# Patient Record
Sex: FEMALE | Race: WHITE | NOT HISPANIC OR LATINO | Employment: OTHER | URBAN - METROPOLITAN AREA
[De-identification: names, ages, dates, MRNs, and addresses within clinical notes are randomized per-mention and may not be internally consistent; named-entity substitution may affect disease eponyms.]

---

## 2017-08-28 ENCOUNTER — HOSPITAL ENCOUNTER (INPATIENT)
Facility: HOSPITAL | Age: 67
LOS: 3 days | Discharge: HOME | DRG: 189 | End: 2017-08-31
Attending: INTERNAL MEDICINE | Admitting: INTERNAL MEDICINE
Payer: MEDICARE

## 2017-08-28 ENCOUNTER — APPOINTMENT (INPATIENT)
Dept: CT IMAGING | Facility: HOSPITAL | Age: 67
DRG: 189 | End: 2017-08-28
Payer: MEDICARE

## 2017-08-28 ENCOUNTER — APPOINTMENT (EMERGENCY)
Dept: RADIOLOGY | Facility: HOSPITAL | Age: 67
DRG: 189 | End: 2017-08-28
Payer: MEDICARE

## 2017-08-28 DIAGNOSIS — J96.01 ACUTE RESPIRATORY FAILURE WITH HYPOXIA (HCC): ICD-10-CM

## 2017-08-28 DIAGNOSIS — J44.1 COPD EXACERBATION (HCC): Primary | ICD-10-CM

## 2017-08-28 DIAGNOSIS — J44.9 COPD (CHRONIC OBSTRUCTIVE PULMONARY DISEASE) (HCC): Chronic | ICD-10-CM

## 2017-08-28 PROBLEM — E11.9 TYPE 2 DIABETES MELLITUS WITHOUT COMPLICATION, WITHOUT LONG-TERM CURRENT USE OF INSULIN (HCC): Chronic | Status: ACTIVE | Noted: 2017-08-28

## 2017-08-28 PROBLEM — E78.5 HLD (HYPERLIPIDEMIA): Chronic | Status: ACTIVE | Noted: 2017-08-28

## 2017-08-28 LAB
ANION GAP SERPL CALCULATED.3IONS-SCNC: 9 MMOL/L (ref 4–13)
ATRIAL RATE: 103 BPM
BASE EXCESS BLDA CALC-SCNC: 0 MMOL/L (ref -2–3)
BASOPHILS # BLD AUTO: 0.03 THOUSANDS/ΜL (ref 0–0.1)
BASOPHILS NFR BLD AUTO: 0 % (ref 0–1)
BUN SERPL-MCNC: 7 MG/DL (ref 5–25)
CA-I BLD-SCNC: 1.17 MMOL/L (ref 1.12–1.32)
CALCIUM SERPL-MCNC: 9.1 MG/DL (ref 8.3–10.1)
CHLORIDE SERPL-SCNC: 104 MMOL/L (ref 100–108)
CO2 SERPL-SCNC: 30 MMOL/L (ref 21–32)
CREAT SERPL-MCNC: 0.69 MG/DL (ref 0.6–1.3)
EOSINOPHIL # BLD AUTO: 0.57 THOUSAND/ΜL (ref 0–0.61)
EOSINOPHIL NFR BLD AUTO: 6 % (ref 0–6)
ERYTHROCYTE [DISTWIDTH] IN BLOOD BY AUTOMATED COUNT: 13 % (ref 11.6–15.1)
GFR SERPL CREATININE-BSD FRML MDRD: 90 ML/MIN/1.73SQ M
GLUCOSE SERPL-MCNC: 124 MG/DL (ref 65–140)
GLUCOSE SERPL-MCNC: 131 MG/DL (ref 65–140)
GLUCOSE SERPL-MCNC: 230 MG/DL (ref 65–140)
GLUCOSE SERPL-MCNC: 292 MG/DL (ref 65–140)
HCO3 BLDA-SCNC: 23.4 MMOL/L (ref 24–30)
HCT VFR BLD AUTO: 38.8 % (ref 34.8–46.1)
HCT VFR BLD CALC: 38 % (ref 34.8–46.1)
HGB BLD-MCNC: 12.7 G/DL (ref 11.5–15.4)
HGB BLDA-MCNC: 12.9 G/DL (ref 11.5–15.4)
LYMPHOCYTES # BLD AUTO: 2.2 THOUSANDS/ΜL (ref 0.6–4.47)
LYMPHOCYTES NFR BLD AUTO: 23 % (ref 14–44)
MAGNESIUM SERPL-MCNC: 1.9 MG/DL (ref 1.6–2.6)
MCH RBC QN AUTO: 30.3 PG (ref 26.8–34.3)
MCHC RBC AUTO-ENTMCNC: 32.7 G/DL (ref 31.4–37.4)
MCV RBC AUTO: 93 FL (ref 82–98)
MONOCYTES # BLD AUTO: 0.91 THOUSAND/ΜL (ref 0.17–1.22)
MONOCYTES NFR BLD AUTO: 10 % (ref 4–12)
NEUTROPHILS # BLD AUTO: 5.73 THOUSANDS/ΜL (ref 1.85–7.62)
NEUTS SEG NFR BLD AUTO: 61 % (ref 43–75)
NT-PROBNP SERPL-MCNC: 57 PG/ML
P AXIS: 84 DEGREES
PCO2 BLD: 24 MMOL/L (ref 21–32)
PCO2 BLD: 34.2 MM HG (ref 42–50)
PH BLD: 7.44 [PH] (ref 7.3–7.4)
PLATELET # BLD AUTO: 282 THOUSANDS/UL (ref 149–390)
PMV BLD AUTO: 9.4 FL (ref 8.9–12.7)
PO2 BLD: 59 MM HG (ref 35–45)
POTASSIUM BLD-SCNC: 3.4 MMOL/L (ref 3.5–5.3)
POTASSIUM SERPL-SCNC: 4.3 MMOL/L (ref 3.5–5.3)
QRS AXIS: 77 DEGREES
QRSD INTERVAL: 74 MS
QT INTERVAL: 322 MS
QTC INTERVAL: 413 MS
RBC # BLD AUTO: 4.19 MILLION/UL (ref 3.81–5.12)
SAO2 % BLD FROM PO2: 91 % (ref 95–98)
SODIUM BLD-SCNC: 139 MMOL/L (ref 136–145)
SODIUM SERPL-SCNC: 143 MMOL/L (ref 136–145)
SPECIMEN SOURCE: ABNORMAL
T WAVE AXIS: 72 DEGREES
TROPONIN I SERPL-MCNC: <0.02 NG/ML
VENTRICULAR RATE: 99 BPM
WBC # BLD AUTO: 9.44 THOUSAND/UL (ref 4.31–10.16)

## 2017-08-28 PROCEDURE — 96374 THER/PROPH/DIAG INJ IV PUSH: CPT

## 2017-08-28 PROCEDURE — 82803 BLOOD GASES ANY COMBINATION: CPT

## 2017-08-28 PROCEDURE — 99285 EMERGENCY DEPT VISIT HI MDM: CPT

## 2017-08-28 PROCEDURE — 36600 WITHDRAWAL OF ARTERIAL BLOOD: CPT

## 2017-08-28 PROCEDURE — 83880 ASSAY OF NATRIURETIC PEPTIDE: CPT | Performed by: PHYSICIAN ASSISTANT

## 2017-08-28 PROCEDURE — 83735 ASSAY OF MAGNESIUM: CPT | Performed by: PHYSICIAN ASSISTANT

## 2017-08-28 PROCEDURE — 82947 ASSAY GLUCOSE BLOOD QUANT: CPT

## 2017-08-28 PROCEDURE — 71020 HB CHEST X-RAY 2VW FRONTAL&LATL: CPT

## 2017-08-28 PROCEDURE — 71250 CT THORAX DX C-: CPT

## 2017-08-28 PROCEDURE — 80048 BASIC METABOLIC PNL TOTAL CA: CPT | Performed by: PHYSICIAN ASSISTANT

## 2017-08-28 PROCEDURE — 94640 AIRWAY INHALATION TREATMENT: CPT

## 2017-08-28 PROCEDURE — 94664 DEMO&/EVAL PT USE INHALER: CPT

## 2017-08-28 PROCEDURE — 82330 ASSAY OF CALCIUM: CPT

## 2017-08-28 PROCEDURE — 84295 ASSAY OF SERUM SODIUM: CPT

## 2017-08-28 PROCEDURE — 84132 ASSAY OF SERUM POTASSIUM: CPT

## 2017-08-28 PROCEDURE — 84484 ASSAY OF TROPONIN QUANT: CPT | Performed by: PHYSICIAN ASSISTANT

## 2017-08-28 PROCEDURE — 82948 REAGENT STRIP/BLOOD GLUCOSE: CPT

## 2017-08-28 PROCEDURE — 94660 CPAP INITIATION&MGMT: CPT

## 2017-08-28 PROCEDURE — 96375 TX/PRO/DX INJ NEW DRUG ADDON: CPT

## 2017-08-28 PROCEDURE — 93005 ELECTROCARDIOGRAM TRACING: CPT | Performed by: PHYSICIAN ASSISTANT

## 2017-08-28 PROCEDURE — 85014 HEMATOCRIT: CPT

## 2017-08-28 PROCEDURE — 85025 COMPLETE CBC W/AUTO DIFF WBC: CPT | Performed by: PHYSICIAN ASSISTANT

## 2017-08-28 PROCEDURE — 94760 N-INVAS EAR/PLS OXIMETRY 1: CPT

## 2017-08-28 PROCEDURE — 36415 COLL VENOUS BLD VENIPUNCTURE: CPT | Performed by: PHYSICIAN ASSISTANT

## 2017-08-28 PROCEDURE — 94668 MNPJ CHEST WALL SBSQ: CPT

## 2017-08-28 RX ORDER — LEVALBUTEROL 1.25 MG/.5ML
1.25 SOLUTION, CONCENTRATE RESPIRATORY (INHALATION)
Status: DISCONTINUED | OUTPATIENT
Start: 2017-08-28 | End: 2017-08-31 | Stop reason: HOSPADM

## 2017-08-28 RX ORDER — METHYLPREDNISOLONE SODIUM SUCCINATE 40 MG/ML
40 INJECTION, POWDER, LYOPHILIZED, FOR SOLUTION INTRAMUSCULAR; INTRAVENOUS EVERY 8 HOURS
Status: DISCONTINUED | OUTPATIENT
Start: 2017-08-28 | End: 2017-08-31

## 2017-08-28 RX ORDER — METHYLPREDNISOLONE SODIUM SUCCINATE 125 MG/2ML
125 INJECTION, POWDER, LYOPHILIZED, FOR SOLUTION INTRAMUSCULAR; INTRAVENOUS ONCE
Status: COMPLETED | OUTPATIENT
Start: 2017-08-28 | End: 2017-08-28

## 2017-08-28 RX ORDER — SIMVASTATIN 20 MG
20 TABLET ORAL
COMMUNITY
End: 2018-01-30 | Stop reason: SDUPTHER

## 2017-08-28 RX ORDER — METFORMIN HYDROCHLORIDE 500 MG/1
500 TABLET, FILM COATED, EXTENDED RELEASE ORAL
COMMUNITY
End: 2018-01-30 | Stop reason: SDUPTHER

## 2017-08-28 RX ORDER — BUDESONIDE AND FORMOTEROL FUMARATE DIHYDRATE 160; 4.5 UG/1; UG/1
2 AEROSOL RESPIRATORY (INHALATION) 2 TIMES DAILY
COMMUNITY
End: 2017-08-31 | Stop reason: HOSPADM

## 2017-08-28 RX ORDER — BUDESONIDE AND FORMOTEROL FUMARATE DIHYDRATE 160; 4.5 UG/1; UG/1
2 AEROSOL RESPIRATORY (INHALATION) 2 TIMES DAILY
Status: DISCONTINUED | OUTPATIENT
Start: 2017-08-28 | End: 2017-08-31 | Stop reason: HOSPADM

## 2017-08-28 RX ORDER — ALBUTEROL SULFATE 2.5 MG/3ML
5 SOLUTION RESPIRATORY (INHALATION) ONCE
Status: COMPLETED | OUTPATIENT
Start: 2017-08-28 | End: 2017-08-28

## 2017-08-28 RX ORDER — GUAIFENESIN 600 MG
1200 TABLET, EXTENDED RELEASE 12 HR ORAL EVERY 12 HOURS SCHEDULED
Status: DISCONTINUED | OUTPATIENT
Start: 2017-08-28 | End: 2017-08-30

## 2017-08-28 RX ORDER — ALBUTEROL SULFATE 90 UG/1
2 AEROSOL, METERED RESPIRATORY (INHALATION) EVERY 6 HOURS PRN
COMMUNITY
End: 2018-08-01 | Stop reason: SDUPTHER

## 2017-08-28 RX ORDER — DIPHENHYDRAMINE HYDROCHLORIDE 50 MG/ML
25 INJECTION INTRAMUSCULAR; INTRAVENOUS EVERY 6 HOURS PRN
Status: DISCONTINUED | OUTPATIENT
Start: 2017-08-28 | End: 2017-08-31 | Stop reason: HOSPADM

## 2017-08-28 RX ORDER — ALBUTEROL SULFATE 2.5 MG/3ML
2.5 SOLUTION RESPIRATORY (INHALATION) EVERY 6 HOURS PRN
Status: DISCONTINUED | OUTPATIENT
Start: 2017-08-28 | End: 2017-08-31 | Stop reason: DRUGHIGH

## 2017-08-28 RX ORDER — LORAZEPAM 2 MG/ML
0.5 INJECTION INTRAMUSCULAR ONCE
Status: COMPLETED | OUTPATIENT
Start: 2017-08-28 | End: 2017-08-28

## 2017-08-28 RX ORDER — GUAIFENESIN/DEXTROMETHORPHAN 100-10MG/5
10 SYRUP ORAL EVERY 4 HOURS PRN
Status: DISCONTINUED | OUTPATIENT
Start: 2017-08-28 | End: 2017-08-31 | Stop reason: HOSPADM

## 2017-08-28 RX ORDER — PRAVASTATIN SODIUM 40 MG
40 TABLET ORAL
Status: DISCONTINUED | OUTPATIENT
Start: 2017-08-28 | End: 2017-08-31 | Stop reason: HOSPADM

## 2017-08-28 RX ADMIN — Medication 0.5 MG: at 11:07

## 2017-08-28 RX ADMIN — METHYLPREDNISOLONE SODIUM SUCCINATE 125 MG: 125 INJECTION, POWDER, FOR SOLUTION INTRAMUSCULAR; INTRAVENOUS at 11:53

## 2017-08-28 RX ADMIN — IPRATROPIUM BROMIDE 0.5 MG: 0.5 SOLUTION RESPIRATORY (INHALATION) at 11:07

## 2017-08-28 RX ADMIN — GUAIFENESIN AND DEXTROMETHORPHAN 10 ML: 100; 10 SYRUP ORAL at 23:07

## 2017-08-28 RX ADMIN — GUAIFENESIN 1200 MG: 600 TABLET, EXTENDED RELEASE ORAL at 20:52

## 2017-08-28 RX ADMIN — LORAZEPAM 0.5 MG: 2 INJECTION INTRAMUSCULAR; INTRAVENOUS at 12:18

## 2017-08-28 RX ADMIN — LEVALBUTEROL HYDROCHLORIDE 1.25 MG: 1.25 SOLUTION, CONCENTRATE RESPIRATORY (INHALATION) at 19:53

## 2017-08-28 RX ADMIN — METHYLPREDNISOLONE SODIUM SUCCINATE 40 MG: 40 INJECTION, POWDER, FOR SOLUTION INTRAMUSCULAR; INTRAVENOUS at 16:25

## 2017-08-28 RX ADMIN — IPRATROPIUM BROMIDE 0.5 MG: 0.5 SOLUTION RESPIRATORY (INHALATION) at 19:53

## 2017-08-28 RX ADMIN — INSULIN LISPRO 2 UNITS: 100 INJECTION, SOLUTION INTRAVENOUS; SUBCUTANEOUS at 16:38

## 2017-08-28 RX ADMIN — ENOXAPARIN SODIUM 40 MG: 40 INJECTION SUBCUTANEOUS at 16:24

## 2017-08-28 RX ADMIN — PRAVASTATIN SODIUM 40 MG: 40 TABLET ORAL at 16:24

## 2017-08-28 RX ADMIN — BUDESONIDE AND FORMOTEROL FUMARATE DIHYDRATE 2 PUFF: 160; 4.5 AEROSOL RESPIRATORY (INHALATION) at 18:00

## 2017-08-28 RX ADMIN — METHYLPREDNISOLONE SODIUM SUCCINATE 40 MG: 40 INJECTION, POWDER, FOR SOLUTION INTRAMUSCULAR; INTRAVENOUS at 23:07

## 2017-08-28 RX ADMIN — ALBUTEROL SULFATE 5 MG: 2.5 SOLUTION RESPIRATORY (INHALATION) at 11:07

## 2017-08-28 RX ADMIN — INSULIN LISPRO 2 UNITS: 100 INJECTION, SOLUTION INTRAVENOUS; SUBCUTANEOUS at 21:11

## 2017-08-29 PROBLEM — R93.89 ABNORMAL CHEST CT: Status: ACTIVE | Noted: 2017-08-29

## 2017-08-29 LAB
ANION GAP SERPL CALCULATED.3IONS-SCNC: 7 MMOL/L (ref 4–13)
BUN SERPL-MCNC: 10 MG/DL (ref 5–25)
CALCIUM SERPL-MCNC: 8.9 MG/DL (ref 8.3–10.1)
CHLORIDE SERPL-SCNC: 103 MMOL/L (ref 100–108)
CO2 SERPL-SCNC: 26 MMOL/L (ref 21–32)
CREAT SERPL-MCNC: 0.68 MG/DL (ref 0.6–1.3)
ERYTHROCYTE [DISTWIDTH] IN BLOOD BY AUTOMATED COUNT: 12.9 % (ref 11.6–15.1)
GFR SERPL CREATININE-BSD FRML MDRD: 91 ML/MIN/1.73SQ M
GLUCOSE SERPL-MCNC: 176 MG/DL (ref 65–140)
GLUCOSE SERPL-MCNC: 184 MG/DL (ref 65–140)
GLUCOSE SERPL-MCNC: 226 MG/DL (ref 65–140)
GLUCOSE SERPL-MCNC: 295 MG/DL (ref 65–140)
GLUCOSE SERPL-MCNC: 315 MG/DL (ref 65–140)
HCT VFR BLD AUTO: 35.2 % (ref 34.8–46.1)
HGB BLD-MCNC: 11.5 G/DL (ref 11.5–15.4)
MCH RBC QN AUTO: 30.1 PG (ref 26.8–34.3)
MCHC RBC AUTO-ENTMCNC: 32.7 G/DL (ref 31.4–37.4)
MCV RBC AUTO: 92 FL (ref 82–98)
PLATELET # BLD AUTO: 283 THOUSANDS/UL (ref 149–390)
PMV BLD AUTO: 9.6 FL (ref 8.9–12.7)
POTASSIUM SERPL-SCNC: 4 MMOL/L (ref 3.5–5.3)
RBC # BLD AUTO: 3.82 MILLION/UL (ref 3.81–5.12)
SODIUM SERPL-SCNC: 136 MMOL/L (ref 136–145)
WBC # BLD AUTO: 6.41 THOUSAND/UL (ref 4.31–10.16)

## 2017-08-29 PROCEDURE — 85027 COMPLETE CBC AUTOMATED: CPT | Performed by: PHYSICIAN ASSISTANT

## 2017-08-29 PROCEDURE — 82948 REAGENT STRIP/BLOOD GLUCOSE: CPT

## 2017-08-29 PROCEDURE — 94640 AIRWAY INHALATION TREATMENT: CPT

## 2017-08-29 PROCEDURE — 94760 N-INVAS EAR/PLS OXIMETRY 1: CPT

## 2017-08-29 PROCEDURE — 80048 BASIC METABOLIC PNL TOTAL CA: CPT | Performed by: PHYSICIAN ASSISTANT

## 2017-08-29 PROCEDURE — 94668 MNPJ CHEST WALL SBSQ: CPT

## 2017-08-29 RX ORDER — INSULIN GLARGINE 100 [IU]/ML
5 INJECTION, SOLUTION SUBCUTANEOUS
Status: DISCONTINUED | OUTPATIENT
Start: 2017-08-29 | End: 2017-08-30

## 2017-08-29 RX ORDER — HYDROCODONE POLISTIREX AND CHLORPHENIRAMINE POLISTIREX 10; 8 MG/5ML; MG/5ML
5 SUSPENSION, EXTENDED RELEASE ORAL EVERY 12 HOURS
Status: DISCONTINUED | OUTPATIENT
Start: 2017-08-29 | End: 2017-08-31 | Stop reason: HOSPADM

## 2017-08-29 RX ORDER — BENZONATATE 100 MG/1
100 CAPSULE ORAL 3 TIMES DAILY
Status: DISCONTINUED | OUTPATIENT
Start: 2017-08-29 | End: 2017-08-30

## 2017-08-29 RX ADMIN — IPRATROPIUM BROMIDE 0.5 MG: 0.5 SOLUTION RESPIRATORY (INHALATION) at 07:25

## 2017-08-29 RX ADMIN — IPRATROPIUM BROMIDE 0.5 MG: 0.5 SOLUTION RESPIRATORY (INHALATION) at 19:21

## 2017-08-29 RX ADMIN — ENOXAPARIN SODIUM 40 MG: 40 INJECTION SUBCUTANEOUS at 08:21

## 2017-08-29 RX ADMIN — INSULIN LISPRO 2 UNITS: 100 INJECTION, SOLUTION INTRAVENOUS; SUBCUTANEOUS at 22:03

## 2017-08-29 RX ADMIN — BENZONATATE 100 MG: 100 CAPSULE ORAL at 17:17

## 2017-08-29 RX ADMIN — GUAIFENESIN 1200 MG: 600 TABLET, EXTENDED RELEASE ORAL at 22:02

## 2017-08-29 RX ADMIN — METHYLPREDNISOLONE SODIUM SUCCINATE 40 MG: 40 INJECTION, POWDER, FOR SOLUTION INTRAMUSCULAR; INTRAVENOUS at 22:30

## 2017-08-29 RX ADMIN — INSULIN LISPRO 3 UNITS: 100 INJECTION, SOLUTION INTRAVENOUS; SUBCUTANEOUS at 12:29

## 2017-08-29 RX ADMIN — INSULIN LISPRO 1 UNITS: 100 INJECTION, SOLUTION INTRAVENOUS; SUBCUTANEOUS at 18:10

## 2017-08-29 RX ADMIN — GUAIFENESIN AND DEXTROMETHORPHAN 10 ML: 100; 10 SYRUP ORAL at 08:23

## 2017-08-29 RX ADMIN — DIPHENHYDRAMINE HYDROCHLORIDE 25 MG: 50 INJECTION, SOLUTION INTRAMUSCULAR; INTRAVENOUS at 03:27

## 2017-08-29 RX ADMIN — BUDESONIDE AND FORMOTEROL FUMARATE DIHYDRATE 2 PUFF: 160; 4.5 AEROSOL RESPIRATORY (INHALATION) at 08:22

## 2017-08-29 RX ADMIN — INSULIN LISPRO 3 UNITS: 100 INJECTION, SOLUTION INTRAVENOUS; SUBCUTANEOUS at 18:10

## 2017-08-29 RX ADMIN — HYDROCODONE POLISTIREX AND CHLORPHENIRAMINE POLISTIREX 5 ML: 10; 8 SUSPENSION, EXTENDED RELEASE ORAL at 20:03

## 2017-08-29 RX ADMIN — GUAIFENESIN AND DEXTROMETHORPHAN 10 ML: 100; 10 SYRUP ORAL at 03:26

## 2017-08-29 RX ADMIN — INSULIN LISPRO 1 UNITS: 100 INJECTION, SOLUTION INTRAVENOUS; SUBCUTANEOUS at 08:22

## 2017-08-29 RX ADMIN — INSULIN GLARGINE 5 UNITS: 100 INJECTION, SOLUTION SUBCUTANEOUS at 22:02

## 2017-08-29 RX ADMIN — LEVALBUTEROL HYDROCHLORIDE 1.25 MG: 1.25 SOLUTION, CONCENTRATE RESPIRATORY (INHALATION) at 19:21

## 2017-08-29 RX ADMIN — LEVALBUTEROL HYDROCHLORIDE 1.25 MG: 1.25 SOLUTION, CONCENTRATE RESPIRATORY (INHALATION) at 07:25

## 2017-08-29 RX ADMIN — BENZONATATE 100 MG: 100 CAPSULE ORAL at 22:02

## 2017-08-29 RX ADMIN — PRAVASTATIN SODIUM 40 MG: 40 TABLET ORAL at 17:17

## 2017-08-29 RX ADMIN — LEVALBUTEROL HYDROCHLORIDE 1.25 MG: 1.25 SOLUTION, CONCENTRATE RESPIRATORY (INHALATION) at 14:08

## 2017-08-29 RX ADMIN — METHYLPREDNISOLONE SODIUM SUCCINATE 40 MG: 40 INJECTION, POWDER, FOR SOLUTION INTRAMUSCULAR; INTRAVENOUS at 16:00

## 2017-08-29 RX ADMIN — METHYLPREDNISOLONE SODIUM SUCCINATE 40 MG: 40 INJECTION, POWDER, FOR SOLUTION INTRAMUSCULAR; INTRAVENOUS at 08:22

## 2017-08-29 RX ADMIN — GUAIFENESIN 1200 MG: 600 TABLET, EXTENDED RELEASE ORAL at 08:22

## 2017-08-29 RX ADMIN — BUDESONIDE AND FORMOTEROL FUMARATE DIHYDRATE 2 PUFF: 160; 4.5 AEROSOL RESPIRATORY (INHALATION) at 17:18

## 2017-08-29 RX ADMIN — IPRATROPIUM BROMIDE 0.5 MG: 0.5 SOLUTION RESPIRATORY (INHALATION) at 14:08

## 2017-08-29 RX ADMIN — GUAIFENESIN AND DEXTROMETHORPHAN 10 ML: 100; 10 SYRUP ORAL at 17:17

## 2017-08-29 RX ADMIN — BENZONATATE 100 MG: 100 CAPSULE ORAL at 11:47

## 2017-08-30 LAB
ANION GAP SERPL CALCULATED.3IONS-SCNC: 10 MMOL/L (ref 4–13)
BASOPHILS # BLD MANUAL: 0 THOUSAND/UL (ref 0–0.1)
BASOPHILS NFR MAR MANUAL: 0 % (ref 0–1)
BUN SERPL-MCNC: 16 MG/DL (ref 5–25)
CALCIUM SERPL-MCNC: 9.1 MG/DL (ref 8.3–10.1)
CHLORIDE SERPL-SCNC: 105 MMOL/L (ref 100–108)
CO2 SERPL-SCNC: 26 MMOL/L (ref 21–32)
CREAT SERPL-MCNC: 0.73 MG/DL (ref 0.6–1.3)
EOSINOPHIL # BLD MANUAL: 0 THOUSAND/UL (ref 0–0.4)
EOSINOPHIL NFR BLD MANUAL: 0 % (ref 0–6)
ERYTHROCYTE [DISTWIDTH] IN BLOOD BY AUTOMATED COUNT: 13.2 % (ref 11.6–15.1)
GFR SERPL CREATININE-BSD FRML MDRD: 85 ML/MIN/1.73SQ M
GLUCOSE SERPL-MCNC: 167 MG/DL (ref 65–140)
GLUCOSE SERPL-MCNC: 177 MG/DL (ref 65–140)
GLUCOSE SERPL-MCNC: 194 MG/DL (ref 65–140)
GLUCOSE SERPL-MCNC: 286 MG/DL (ref 65–140)
GLUCOSE SERPL-MCNC: 321 MG/DL (ref 65–140)
HCT VFR BLD AUTO: 36.6 % (ref 34.8–46.1)
HGB BLD-MCNC: 12 G/DL (ref 11.5–15.4)
LYMPHOCYTES # BLD AUTO: 0.64 THOUSAND/UL (ref 0.6–4.47)
LYMPHOCYTES # BLD AUTO: 4 % (ref 14–44)
MCH RBC QN AUTO: 30.4 PG (ref 26.8–34.3)
MCHC RBC AUTO-ENTMCNC: 32.8 G/DL (ref 31.4–37.4)
MCV RBC AUTO: 93 FL (ref 82–98)
MONOCYTES # BLD AUTO: 0 THOUSAND/UL (ref 0–1.22)
MONOCYTES NFR BLD: 0 % (ref 4–12)
NEUTROPHILS # BLD MANUAL: 15.31 THOUSAND/UL (ref 1.85–7.62)
NEUTS SEG NFR BLD AUTO: 96 % (ref 43–75)
PLATELET # BLD AUTO: 334 THOUSANDS/UL (ref 149–390)
PLATELET BLD QL SMEAR: ADEQUATE
PMV BLD AUTO: 9.3 FL (ref 8.9–12.7)
POTASSIUM SERPL-SCNC: 4.2 MMOL/L (ref 3.5–5.3)
RBC # BLD AUTO: 3.95 MILLION/UL (ref 3.81–5.12)
SODIUM SERPL-SCNC: 141 MMOL/L (ref 136–145)
TOTAL CELLS COUNTED SPEC: 100
WBC # BLD AUTO: 15.95 THOUSAND/UL (ref 4.31–10.16)

## 2017-08-30 PROCEDURE — 94640 AIRWAY INHALATION TREATMENT: CPT

## 2017-08-30 PROCEDURE — 94760 N-INVAS EAR/PLS OXIMETRY 1: CPT

## 2017-08-30 PROCEDURE — 80048 BASIC METABOLIC PNL TOTAL CA: CPT | Performed by: PHYSICIAN ASSISTANT

## 2017-08-30 PROCEDURE — 82948 REAGENT STRIP/BLOOD GLUCOSE: CPT

## 2017-08-30 PROCEDURE — 85027 COMPLETE CBC AUTOMATED: CPT | Performed by: PHYSICIAN ASSISTANT

## 2017-08-30 PROCEDURE — 85007 BL SMEAR W/DIFF WBC COUNT: CPT | Performed by: PHYSICIAN ASSISTANT

## 2017-08-30 PROCEDURE — 94668 MNPJ CHEST WALL SBSQ: CPT

## 2017-08-30 RX ORDER — INSULIN GLARGINE 100 [IU]/ML
10 INJECTION, SOLUTION SUBCUTANEOUS
Status: DISCONTINUED | OUTPATIENT
Start: 2017-08-30 | End: 2017-08-31 | Stop reason: HOSPADM

## 2017-08-30 RX ORDER — BENZONATATE 100 MG/1
200 CAPSULE ORAL 3 TIMES DAILY
Status: DISCONTINUED | OUTPATIENT
Start: 2017-08-30 | End: 2017-08-31 | Stop reason: HOSPADM

## 2017-08-30 RX ADMIN — METHYLPREDNISOLONE SODIUM SUCCINATE 40 MG: 40 INJECTION, POWDER, FOR SOLUTION INTRAMUSCULAR; INTRAVENOUS at 06:31

## 2017-08-30 RX ADMIN — INSULIN LISPRO 2 UNITS: 100 INJECTION, SOLUTION INTRAVENOUS; SUBCUTANEOUS at 21:38

## 2017-08-30 RX ADMIN — IPRATROPIUM BROMIDE 0.5 MG: 0.5 SOLUTION RESPIRATORY (INHALATION) at 08:24

## 2017-08-30 RX ADMIN — INSULIN LISPRO 1 UNITS: 100 INJECTION, SOLUTION INTRAVENOUS; SUBCUTANEOUS at 09:11

## 2017-08-30 RX ADMIN — HYDROCODONE POLISTIREX AND CHLORPHENIRAMINE POLISTIREX 5 ML: 10; 8 SUSPENSION, EXTENDED RELEASE ORAL at 09:03

## 2017-08-30 RX ADMIN — METHYLPREDNISOLONE SODIUM SUCCINATE 40 MG: 40 INJECTION, POWDER, FOR SOLUTION INTRAMUSCULAR; INTRAVENOUS at 15:07

## 2017-08-30 RX ADMIN — LEVALBUTEROL HYDROCHLORIDE 1.25 MG: 1.25 SOLUTION, CONCENTRATE RESPIRATORY (INHALATION) at 13:53

## 2017-08-30 RX ADMIN — BUDESONIDE AND FORMOTEROL FUMARATE DIHYDRATE 2 PUFF: 160; 4.5 AEROSOL RESPIRATORY (INHALATION) at 09:04

## 2017-08-30 RX ADMIN — INSULIN LISPRO 3 UNITS: 100 INJECTION, SOLUTION INTRAVENOUS; SUBCUTANEOUS at 12:18

## 2017-08-30 RX ADMIN — BENZONATATE 100 MG: 100 CAPSULE ORAL at 09:03

## 2017-08-30 RX ADMIN — IPRATROPIUM BROMIDE 0.5 MG: 0.5 SOLUTION RESPIRATORY (INHALATION) at 13:53

## 2017-08-30 RX ADMIN — LEVALBUTEROL HYDROCHLORIDE 1.25 MG: 1.25 SOLUTION, CONCENTRATE RESPIRATORY (INHALATION) at 19:04

## 2017-08-30 RX ADMIN — NYSTATIN 500000 UNITS: 100000 SUSPENSION ORAL at 17:55

## 2017-08-30 RX ADMIN — HYDROCODONE POLISTIREX AND CHLORPHENIRAMINE POLISTIREX 5 ML: 10; 8 SUSPENSION, EXTENDED RELEASE ORAL at 20:47

## 2017-08-30 RX ADMIN — NYSTATIN 500000 UNITS: 100000 SUSPENSION ORAL at 12:16

## 2017-08-30 RX ADMIN — IPRATROPIUM BROMIDE 0.5 MG: 0.5 SOLUTION RESPIRATORY (INHALATION) at 19:04

## 2017-08-30 RX ADMIN — INSULIN GLARGINE 10 UNITS: 100 INJECTION, SOLUTION SUBCUTANEOUS at 21:37

## 2017-08-30 RX ADMIN — INSULIN LISPRO 1 UNITS: 100 INJECTION, SOLUTION INTRAVENOUS; SUBCUTANEOUS at 17:49

## 2017-08-30 RX ADMIN — BUDESONIDE AND FORMOTEROL FUMARATE DIHYDRATE 2 PUFF: 160; 4.5 AEROSOL RESPIRATORY (INHALATION) at 17:48

## 2017-08-30 RX ADMIN — NYSTATIN 500000 UNITS: 100000 SUSPENSION ORAL at 21:37

## 2017-08-30 RX ADMIN — BENZONATATE 200 MG: 100 CAPSULE ORAL at 17:47

## 2017-08-30 RX ADMIN — GUAIFENESIN 1200 MG: 600 TABLET, EXTENDED RELEASE ORAL at 09:03

## 2017-08-30 RX ADMIN — Medication 1 SPRAY: at 12:00

## 2017-08-30 RX ADMIN — INSULIN LISPRO 3 UNITS: 100 INJECTION, SOLUTION INTRAVENOUS; SUBCUTANEOUS at 09:10

## 2017-08-30 RX ADMIN — LEVALBUTEROL HYDROCHLORIDE 1.25 MG: 1.25 SOLUTION, CONCENTRATE RESPIRATORY (INHALATION) at 08:24

## 2017-08-30 RX ADMIN — PRAVASTATIN SODIUM 40 MG: 40 TABLET ORAL at 17:48

## 2017-08-30 RX ADMIN — BENZONATATE 200 MG: 100 CAPSULE ORAL at 21:37

## 2017-08-30 RX ADMIN — ENOXAPARIN SODIUM 40 MG: 40 INJECTION SUBCUTANEOUS at 09:03

## 2017-08-31 VITALS
OXYGEN SATURATION: 92 % | DIASTOLIC BLOOD PRESSURE: 73 MMHG | TEMPERATURE: 97.6 F | HEIGHT: 59 IN | WEIGHT: 155.42 LBS | RESPIRATION RATE: 20 BRPM | SYSTOLIC BLOOD PRESSURE: 120 MMHG | BODY MASS INDEX: 31.33 KG/M2 | HEART RATE: 92 BPM

## 2017-08-31 PROBLEM — R91.1 PULMONARY NODULE: Chronic | Status: ACTIVE | Noted: 2017-08-31

## 2017-08-31 LAB
ANION GAP SERPL CALCULATED.3IONS-SCNC: 6 MMOL/L (ref 4–13)
BASOPHILS # BLD MANUAL: 0 THOUSAND/UL (ref 0–0.1)
BASOPHILS NFR MAR MANUAL: 0 % (ref 0–1)
BUN SERPL-MCNC: 18 MG/DL (ref 5–25)
CALCIUM SERPL-MCNC: 8.9 MG/DL (ref 8.3–10.1)
CHLORIDE SERPL-SCNC: 104 MMOL/L (ref 100–108)
CO2 SERPL-SCNC: 29 MMOL/L (ref 21–32)
CREAT SERPL-MCNC: 0.69 MG/DL (ref 0.6–1.3)
EOSINOPHIL # BLD MANUAL: 0 THOUSAND/UL (ref 0–0.4)
EOSINOPHIL NFR BLD MANUAL: 0 % (ref 0–6)
ERYTHROCYTE [DISTWIDTH] IN BLOOD BY AUTOMATED COUNT: 13.4 % (ref 11.6–15.1)
GFR SERPL CREATININE-BSD FRML MDRD: 90 ML/MIN/1.73SQ M
GLUCOSE SERPL-MCNC: 184 MG/DL (ref 65–140)
GLUCOSE SERPL-MCNC: 193 MG/DL (ref 65–140)
GLUCOSE SERPL-MCNC: 321 MG/DL (ref 65–140)
HCT VFR BLD AUTO: 35.7 % (ref 34.8–46.1)
HGB BLD-MCNC: 11.4 G/DL (ref 11.5–15.4)
LYMPHOCYTES # BLD AUTO: 0.51 THOUSAND/UL (ref 0.6–4.47)
LYMPHOCYTES # BLD AUTO: 4 % (ref 14–44)
MCH RBC QN AUTO: 30 PG (ref 26.8–34.3)
MCHC RBC AUTO-ENTMCNC: 31.9 G/DL (ref 31.4–37.4)
MCV RBC AUTO: 94 FL (ref 82–98)
METAMYELOCYTES NFR BLD MANUAL: 1 % (ref 0–1)
MONOCYTES # BLD AUTO: 0.51 THOUSAND/UL (ref 0–1.22)
MONOCYTES NFR BLD: 4 % (ref 4–12)
NEUTROPHILS # BLD MANUAL: 11.54 THOUSAND/UL (ref 1.85–7.62)
NEUTS SEG NFR BLD AUTO: 91 % (ref 43–75)
PLATELET # BLD AUTO: 346 THOUSANDS/UL (ref 149–390)
PLATELET BLD QL SMEAR: ADEQUATE
PMV BLD AUTO: 9.5 FL (ref 8.9–12.7)
POTASSIUM SERPL-SCNC: 4.3 MMOL/L (ref 3.5–5.3)
RBC # BLD AUTO: 3.8 MILLION/UL (ref 3.81–5.12)
SODIUM SERPL-SCNC: 139 MMOL/L (ref 136–145)
TOTAL CELLS COUNTED SPEC: 100
WBC # BLD AUTO: 12.68 THOUSAND/UL (ref 4.31–10.16)

## 2017-08-31 PROCEDURE — 85027 COMPLETE CBC AUTOMATED: CPT | Performed by: PHYSICIAN ASSISTANT

## 2017-08-31 PROCEDURE — 80048 BASIC METABOLIC PNL TOTAL CA: CPT | Performed by: PHYSICIAN ASSISTANT

## 2017-08-31 PROCEDURE — 94668 MNPJ CHEST WALL SBSQ: CPT

## 2017-08-31 PROCEDURE — 94760 N-INVAS EAR/PLS OXIMETRY 1: CPT

## 2017-08-31 PROCEDURE — 94640 AIRWAY INHALATION TREATMENT: CPT

## 2017-08-31 PROCEDURE — 85007 BL SMEAR W/DIFF WBC COUNT: CPT | Performed by: PHYSICIAN ASSISTANT

## 2017-08-31 PROCEDURE — 82948 REAGENT STRIP/BLOOD GLUCOSE: CPT

## 2017-08-31 RX ORDER — ALBUTEROL SULFATE 2.5 MG/3ML
2.5 SOLUTION RESPIRATORY (INHALATION) EVERY 4 HOURS PRN
Status: DISCONTINUED | OUTPATIENT
Start: 2017-08-31 | End: 2017-08-31 | Stop reason: HOSPADM

## 2017-08-31 RX ORDER — GUAIFENESIN/DEXTROMETHORPHAN 100-10MG/5
10 SYRUP ORAL EVERY 4 HOURS PRN
Qty: 118 ML | Refills: 0 | Status: SHIPPED | OUTPATIENT
Start: 2017-08-31 | End: 2017-09-14 | Stop reason: HOSPADM

## 2017-08-31 RX ORDER — PREDNISONE 20 MG/1
40 TABLET ORAL DAILY
Status: DISCONTINUED | OUTPATIENT
Start: 2017-09-01 | End: 2017-08-31 | Stop reason: HOSPADM

## 2017-08-31 RX ORDER — PREDNISONE 20 MG/1
40 TABLET ORAL DAILY
Qty: 2 TABLET | Refills: 0 | Status: SHIPPED | OUTPATIENT
Start: 2017-09-01 | End: 2017-09-03

## 2017-08-31 RX ORDER — ALBUTEROL SULFATE 2.5 MG/3ML
2.5 SOLUTION RESPIRATORY (INHALATION) EVERY 4 HOURS PRN
Qty: 75 ML | Refills: 0 | Status: SHIPPED | OUTPATIENT
Start: 2017-08-31 | End: 2018-04-11 | Stop reason: ALTCHOICE

## 2017-08-31 RX ORDER — BENZONATATE 200 MG/1
200 CAPSULE ORAL 3 TIMES DAILY
Qty: 20 CAPSULE | Refills: 0 | Status: SHIPPED | OUTPATIENT
Start: 2017-08-31 | End: 2017-09-14 | Stop reason: HOSPADM

## 2017-08-31 RX ORDER — ALPRAZOLAM 0.25 MG/1
0.25 TABLET ORAL 3 TIMES DAILY PRN
Qty: 30 TABLET | Refills: 0 | Status: SHIPPED | OUTPATIENT
Start: 2017-08-31 | End: 2018-10-11

## 2017-08-31 RX ORDER — HYDROCODONE POLISTIREX AND CHLORPHENIRAMINE POLISTIREX 10; 8 MG/5ML; MG/5ML
5 SUSPENSION, EXTENDED RELEASE ORAL EVERY 12 HOURS
Qty: 100 ML | Refills: 0 | Status: SHIPPED | OUTPATIENT
Start: 2017-08-31 | End: 2017-09-14 | Stop reason: HOSPADM

## 2017-08-31 RX ADMIN — NYSTATIN 500000 UNITS: 100000 SUSPENSION ORAL at 12:49

## 2017-08-31 RX ADMIN — NYSTATIN 500000 UNITS: 100000 SUSPENSION ORAL at 08:08

## 2017-08-31 RX ADMIN — HYDROCODONE POLISTIREX AND CHLORPHENIRAMINE POLISTIREX 5 ML: 10; 8 SUSPENSION, EXTENDED RELEASE ORAL at 08:12

## 2017-08-31 RX ADMIN — METHYLPREDNISOLONE SODIUM SUCCINATE 40 MG: 40 INJECTION, POWDER, FOR SOLUTION INTRAMUSCULAR; INTRAVENOUS at 06:30

## 2017-08-31 RX ADMIN — BENZONATATE 200 MG: 100 CAPSULE ORAL at 08:08

## 2017-08-31 RX ADMIN — ENOXAPARIN SODIUM 40 MG: 40 INJECTION SUBCUTANEOUS at 08:08

## 2017-08-31 RX ADMIN — GUAIFENESIN AND DEXTROMETHORPHAN 10 ML: 100; 10 SYRUP ORAL at 06:31

## 2017-08-31 RX ADMIN — INSULIN LISPRO 1 UNITS: 100 INJECTION, SOLUTION INTRAVENOUS; SUBCUTANEOUS at 08:08

## 2017-08-31 RX ADMIN — INSULIN LISPRO 3 UNITS: 100 INJECTION, SOLUTION INTRAVENOUS; SUBCUTANEOUS at 12:49

## 2017-08-31 RX ADMIN — BUDESONIDE AND FORMOTEROL FUMARATE DIHYDRATE 2 PUFF: 160; 4.5 AEROSOL RESPIRATORY (INHALATION) at 08:08

## 2017-08-31 RX ADMIN — IPRATROPIUM BROMIDE 0.5 MG: 0.5 SOLUTION RESPIRATORY (INHALATION) at 07:50

## 2017-08-31 RX ADMIN — LEVALBUTEROL HYDROCHLORIDE 1.25 MG: 1.25 SOLUTION, CONCENTRATE RESPIRATORY (INHALATION) at 07:50

## 2017-08-31 RX ADMIN — METHYLPREDNISOLONE SODIUM SUCCINATE 40 MG: 40 INJECTION, POWDER, FOR SOLUTION INTRAMUSCULAR; INTRAVENOUS at 00:45

## 2017-09-09 ENCOUNTER — APPOINTMENT (EMERGENCY)
Dept: RADIOLOGY | Facility: HOSPITAL | Age: 67
DRG: 871 | End: 2017-09-09
Attending: EMERGENCY MEDICINE
Payer: MEDICARE

## 2017-09-09 ENCOUNTER — APPOINTMENT (EMERGENCY)
Dept: CT IMAGING | Facility: HOSPITAL | Age: 67
DRG: 871 | End: 2017-09-09
Payer: MEDICARE

## 2017-09-09 ENCOUNTER — HOSPITAL ENCOUNTER (INPATIENT)
Facility: HOSPITAL | Age: 67
LOS: 5 days | Discharge: HOME | DRG: 871 | End: 2017-09-14
Attending: EMERGENCY MEDICINE | Admitting: INTERNAL MEDICINE
Payer: MEDICARE

## 2017-09-09 DIAGNOSIS — R05.9 COUGH: ICD-10-CM

## 2017-09-09 DIAGNOSIS — R50.9 FEVER: Primary | ICD-10-CM

## 2017-09-09 DIAGNOSIS — R19.7 DIARRHEA: ICD-10-CM

## 2017-09-09 DIAGNOSIS — R53.1 ASTHENIA: ICD-10-CM

## 2017-09-09 PROBLEM — R65.10 SIRS (SYSTEMIC INFLAMMATORY RESPONSE SYNDROME) (HCC): Status: ACTIVE | Noted: 2017-09-09

## 2017-09-09 PROBLEM — R06.02 SOB (SHORTNESS OF BREATH): Status: ACTIVE | Noted: 2017-08-28

## 2017-09-09 LAB
ANION GAP SERPL CALCULATED.3IONS-SCNC: 12 MMOL/L (ref 4–13)
BACTERIA UR QL AUTO: ABNORMAL /HPF
BASOPHILS # BLD AUTO: 0.02 THOUSANDS/ΜL (ref 0–0.1)
BASOPHILS NFR BLD AUTO: 0 % (ref 0–1)
BILIRUB UR QL STRIP: ABNORMAL
BUN SERPL-MCNC: 7 MG/DL (ref 5–25)
CALCIUM SERPL-MCNC: 8.6 MG/DL (ref 8.3–10.1)
CHLORIDE SERPL-SCNC: 95 MMOL/L (ref 100–108)
CLARITY UR: ABNORMAL
CLARITY, POC: CLEAR
CO2 SERPL-SCNC: 27 MMOL/L (ref 21–32)
COLOR UR: YELLOW
COLOR, POC: YELLOW
CREAT SERPL-MCNC: 0.89 MG/DL (ref 0.6–1.3)
EOSINOPHIL # BLD AUTO: 0.16 THOUSAND/ΜL (ref 0–0.61)
EOSINOPHIL NFR BLD AUTO: 2 % (ref 0–6)
ERYTHROCYTE [DISTWIDTH] IN BLOOD BY AUTOMATED COUNT: 13.1 % (ref 11.6–15.1)
EXT BILIRUBIN, UA: NORMAL
EXT BLOOD URINE: NORMAL
EXT GLUCOSE, UA: NORMAL
EXT KETONES: NORMAL
EXT NITRITE, UA: NORMAL
EXT PH, UA: 6
EXT PROTEIN, UA: NORMAL
EXT SPECIFIC GRAVITY, UA: 1.01
EXT UROBILINOGEN: NORMAL
GFR SERPL CREATININE-BSD FRML MDRD: 67 ML/MIN/1.73SQ M
GLUCOSE SERPL-MCNC: 183 MG/DL (ref 65–140)
GLUCOSE SERPL-MCNC: 231 MG/DL (ref 65–140)
GLUCOSE UR STRIP-MCNC: NEGATIVE MG/DL
HCT VFR BLD AUTO: 37.4 % (ref 34.8–46.1)
HGB BLD-MCNC: 12.4 G/DL (ref 11.5–15.4)
HGB UR QL STRIP.AUTO: NEGATIVE
KETONES UR STRIP-MCNC: ABNORMAL MG/DL
LACTATE SERPL-SCNC: 2 MMOL/L (ref 0.5–2)
LEUKOCYTE ESTERASE UR QL STRIP: ABNORMAL
LYMPHOCYTES # BLD AUTO: 1.32 THOUSANDS/ΜL (ref 0.6–4.47)
LYMPHOCYTES NFR BLD AUTO: 13 % (ref 14–44)
MCH RBC QN AUTO: 30.1 PG (ref 26.8–34.3)
MCHC RBC AUTO-ENTMCNC: 33.2 G/DL (ref 31.4–37.4)
MCV RBC AUTO: 91 FL (ref 82–98)
MONOCYTES # BLD AUTO: 1.12 THOUSAND/ΜL (ref 0.17–1.22)
MONOCYTES NFR BLD AUTO: 11 % (ref 4–12)
NEUTROPHILS # BLD AUTO: 7.36 THOUSANDS/ΜL (ref 1.85–7.62)
NEUTS SEG NFR BLD AUTO: 74 % (ref 43–75)
NITRITE UR QL STRIP: NEGATIVE
NON-SQ EPI CELLS URNS QL MICRO: ABNORMAL /HPF
PH UR STRIP.AUTO: 6 [PH] (ref 4.5–8)
PLATELET # BLD AUTO: 287 THOUSANDS/UL (ref 149–390)
PMV BLD AUTO: 9.7 FL (ref 8.9–12.7)
POTASSIUM SERPL-SCNC: 3.2 MMOL/L (ref 3.5–5.3)
PROT UR STRIP-MCNC: ABNORMAL MG/DL
RBC # BLD AUTO: 4.12 MILLION/UL (ref 3.81–5.12)
RBC #/AREA URNS AUTO: ABNORMAL /HPF
SODIUM SERPL-SCNC: 134 MMOL/L (ref 136–145)
SP GR UR STRIP.AUTO: 1.02 (ref 1–1.03)
UROBILINOGEN UR QL STRIP.AUTO: 0.2 E.U./DL
WBC # BLD AUTO: 9.98 THOUSAND/UL (ref 4.31–10.16)
WBC # BLD EST: NORMAL 10*3/UL
WBC #/AREA URNS AUTO: ABNORMAL /HPF

## 2017-09-09 PROCEDURE — 82948 REAGENT STRIP/BLOOD GLUCOSE: CPT

## 2017-09-09 PROCEDURE — 96361 HYDRATE IV INFUSION ADD-ON: CPT

## 2017-09-09 PROCEDURE — 99285 EMERGENCY DEPT VISIT HI MDM: CPT

## 2017-09-09 PROCEDURE — 87077 CULTURE AEROBIC IDENTIFY: CPT | Performed by: EMERGENCY MEDICINE

## 2017-09-09 PROCEDURE — 81002 URINALYSIS NONAUTO W/O SCOPE: CPT | Performed by: EMERGENCY MEDICINE

## 2017-09-09 PROCEDURE — 87040 BLOOD CULTURE FOR BACTERIA: CPT | Performed by: EMERGENCY MEDICINE

## 2017-09-09 PROCEDURE — 93005 ELECTROCARDIOGRAM TRACING: CPT | Performed by: EMERGENCY MEDICINE

## 2017-09-09 PROCEDURE — 80048 BASIC METABOLIC PNL TOTAL CA: CPT | Performed by: EMERGENCY MEDICINE

## 2017-09-09 PROCEDURE — 71250 CT THORAX DX C-: CPT

## 2017-09-09 PROCEDURE — 87086 URINE CULTURE/COLONY COUNT: CPT | Performed by: EMERGENCY MEDICINE

## 2017-09-09 PROCEDURE — 83605 ASSAY OF LACTIC ACID: CPT | Performed by: EMERGENCY MEDICINE

## 2017-09-09 PROCEDURE — 96375 TX/PRO/DX INJ NEW DRUG ADDON: CPT

## 2017-09-09 PROCEDURE — 94760 N-INVAS EAR/PLS OXIMETRY 1: CPT

## 2017-09-09 PROCEDURE — 71010 HB CHEST X-RAY 1 VIEW FRONTAL (PORTABLE): CPT

## 2017-09-09 PROCEDURE — 87186 SC STD MICRODIL/AGAR DIL: CPT | Performed by: EMERGENCY MEDICINE

## 2017-09-09 PROCEDURE — 96374 THER/PROPH/DIAG INJ IV PUSH: CPT

## 2017-09-09 PROCEDURE — 81001 URINALYSIS AUTO W/SCOPE: CPT | Performed by: EMERGENCY MEDICINE

## 2017-09-09 PROCEDURE — 94664 DEMO&/EVAL PT USE INHALER: CPT

## 2017-09-09 PROCEDURE — 93005 ELECTROCARDIOGRAM TRACING: CPT

## 2017-09-09 PROCEDURE — 36415 COLL VENOUS BLD VENIPUNCTURE: CPT | Performed by: EMERGENCY MEDICINE

## 2017-09-09 PROCEDURE — 85025 COMPLETE CBC W/AUTO DIFF WBC: CPT | Performed by: EMERGENCY MEDICINE

## 2017-09-09 RX ORDER — ALBUTEROL SULFATE 2.5 MG/3ML
2.5 SOLUTION RESPIRATORY (INHALATION) EVERY 4 HOURS PRN
Status: DISCONTINUED | OUTPATIENT
Start: 2017-09-09 | End: 2017-09-14 | Stop reason: HOSPADM

## 2017-09-09 RX ORDER — HYDROCODONE POLISTIREX AND CHLORPHENIRAMINE POLISTIREX 10; 8 MG/5ML; MG/5ML
5 SUSPENSION, EXTENDED RELEASE ORAL EVERY 12 HOURS PRN
Status: DISCONTINUED | OUTPATIENT
Start: 2017-09-09 | End: 2017-09-14 | Stop reason: HOSPADM

## 2017-09-09 RX ORDER — POTASSIUM CHLORIDE 20 MEQ/1
40 TABLET, EXTENDED RELEASE ORAL ONCE
Status: COMPLETED | OUTPATIENT
Start: 2017-09-09 | End: 2017-09-09

## 2017-09-09 RX ORDER — ACETAMINOPHEN 325 MG/1
650 TABLET ORAL EVERY 6 HOURS PRN
Status: DISCONTINUED | OUTPATIENT
Start: 2017-09-09 | End: 2017-09-14 | Stop reason: HOSPADM

## 2017-09-09 RX ORDER — BENZONATATE 100 MG/1
200 CAPSULE ORAL 3 TIMES DAILY
Status: DISCONTINUED | OUTPATIENT
Start: 2017-09-09 | End: 2017-09-14 | Stop reason: HOSPADM

## 2017-09-09 RX ORDER — ONDANSETRON 2 MG/ML
4 INJECTION INTRAMUSCULAR; INTRAVENOUS EVERY 4 HOURS PRN
Status: DISCONTINUED | OUTPATIENT
Start: 2017-09-09 | End: 2017-09-14 | Stop reason: HOSPADM

## 2017-09-09 RX ORDER — SODIUM CHLORIDE 9 MG/ML
100 INJECTION, SOLUTION INTRAVENOUS CONTINUOUS
Status: DISPENSED | OUTPATIENT
Start: 2017-09-09 | End: 2017-09-10

## 2017-09-09 RX ORDER — MORPHINE SULFATE 4 MG/ML
4 INJECTION, SOLUTION INTRAMUSCULAR; INTRAVENOUS ONCE
Status: COMPLETED | OUTPATIENT
Start: 2017-09-09 | End: 2017-09-09

## 2017-09-09 RX ORDER — MAGNESIUM HYDROXIDE/ALUMINUM HYDROXICE/SIMETHICONE 120; 1200; 1200 MG/30ML; MG/30ML; MG/30ML
30 SUSPENSION ORAL EVERY 6 HOURS PRN
Status: DISCONTINUED | OUTPATIENT
Start: 2017-09-09 | End: 2017-09-14 | Stop reason: HOSPADM

## 2017-09-09 RX ORDER — PANTOPRAZOLE SODIUM 40 MG/1
40 TABLET, DELAYED RELEASE ORAL DAILY
Status: DISCONTINUED | OUTPATIENT
Start: 2017-09-10 | End: 2017-09-14 | Stop reason: HOSPADM

## 2017-09-09 RX ORDER — ECHINACEA PURPUREA EXTRACT 125 MG
2 TABLET ORAL AS NEEDED
Status: DISCONTINUED | OUTPATIENT
Start: 2017-09-09 | End: 2017-09-12

## 2017-09-09 RX ORDER — PRAVASTATIN SODIUM 40 MG
40 TABLET ORAL
Status: DISCONTINUED | OUTPATIENT
Start: 2017-09-10 | End: 2017-09-14 | Stop reason: HOSPADM

## 2017-09-09 RX ORDER — DICYCLOMINE HYDROCHLORIDE 10 MG/1
20 CAPSULE ORAL 3 TIMES DAILY
Status: DISCONTINUED | OUTPATIENT
Start: 2017-09-09 | End: 2017-09-09

## 2017-09-09 RX ORDER — ONDANSETRON 2 MG/ML
4 INJECTION INTRAMUSCULAR; INTRAVENOUS ONCE
Status: COMPLETED | OUTPATIENT
Start: 2017-09-09 | End: 2017-09-09

## 2017-09-09 RX ORDER — BUPIVACAINE HYDROCHLORIDE 5 MG/ML
30 INJECTION, SOLUTION EPIDURAL; INTRACAUDAL ONCE
Status: DISCONTINUED | OUTPATIENT
Start: 2017-09-09 | End: 2017-09-14 | Stop reason: HOSPADM

## 2017-09-09 RX ORDER — ALBUTEROL SULFATE 2.5 MG/3ML
SOLUTION RESPIRATORY (INHALATION)
Status: COMPLETED
Start: 2017-09-09 | End: 2017-09-09

## 2017-09-09 RX ORDER — ACETAMINOPHEN 325 MG/1
650 TABLET ORAL ONCE
Status: COMPLETED | OUTPATIENT
Start: 2017-09-09 | End: 2017-09-09

## 2017-09-09 RX ORDER — LEVOFLOXACIN 5 MG/ML
750 INJECTION, SOLUTION INTRAVENOUS EVERY 24 HOURS
Status: DISCONTINUED | OUTPATIENT
Start: 2017-09-09 | End: 2017-09-10

## 2017-09-09 RX ORDER — ALPRAZOLAM 0.25 MG/1
0.25 TABLET ORAL 3 TIMES DAILY PRN
Status: DISCONTINUED | OUTPATIENT
Start: 2017-09-09 | End: 2017-09-14 | Stop reason: HOSPADM

## 2017-09-09 RX ADMIN — SODIUM CHLORIDE 1000 ML: 0.9 INJECTION, SOLUTION INTRAVENOUS at 16:45

## 2017-09-09 RX ADMIN — SODIUM CHLORIDE 100 ML/HR: 0.9 INJECTION, SOLUTION INTRAVENOUS at 21:45

## 2017-09-09 RX ADMIN — ACETAMINOPHEN 650 MG: 325 TABLET ORAL at 16:46

## 2017-09-09 RX ADMIN — BENZONATATE 200 MG: 100 CAPSULE ORAL at 21:46

## 2017-09-09 RX ADMIN — ONDANSETRON 4 MG: 2 INJECTION INTRAMUSCULAR; INTRAVENOUS at 16:48

## 2017-09-09 RX ADMIN — CEFTRIAXONE 2000 MG: 2 INJECTION, POWDER, FOR SOLUTION INTRAMUSCULAR; INTRAVENOUS at 19:48

## 2017-09-09 RX ADMIN — POTASSIUM CHLORIDE 40 MEQ: 1500 TABLET, EXTENDED RELEASE ORAL at 21:46

## 2017-09-09 RX ADMIN — MORPHINE SULFATE 4 MG: 4 INJECTION, SOLUTION INTRAMUSCULAR; INTRAVENOUS at 16:48

## 2017-09-10 ENCOUNTER — APPOINTMENT (INPATIENT)
Dept: CT IMAGING | Facility: HOSPITAL | Age: 67
DRG: 871 | End: 2017-09-10
Payer: MEDICARE

## 2017-09-10 PROBLEM — E83.42 HYPOMAGNESEMIA: Status: ACTIVE | Noted: 2017-09-10

## 2017-09-10 PROBLEM — E11.9 DM (DIABETES MELLITUS) (HCC): Chronic | Status: ACTIVE | Noted: 2017-09-10

## 2017-09-10 LAB
ALBUMIN SERPL BCP-MCNC: 2.3 G/DL (ref 3.5–5)
ALP SERPL-CCNC: 57 U/L (ref 46–116)
ALT SERPL W P-5'-P-CCNC: 20 U/L (ref 12–78)
ANION GAP SERPL CALCULATED.3IONS-SCNC: 10 MMOL/L (ref 4–13)
AST SERPL W P-5'-P-CCNC: 14 U/L (ref 5–45)
ATRIAL RATE: 140 BPM
BILIRUB SERPL-MCNC: 0.3 MG/DL (ref 0.2–1)
BUN SERPL-MCNC: 7 MG/DL (ref 5–25)
CALCIUM SERPL-MCNC: 8.3 MG/DL (ref 8.3–10.1)
CHLORIDE SERPL-SCNC: 102 MMOL/L (ref 100–108)
CO2 SERPL-SCNC: 25 MMOL/L (ref 21–32)
CREAT SERPL-MCNC: 0.78 MG/DL (ref 0.6–1.3)
ERYTHROCYTE [DISTWIDTH] IN BLOOD BY AUTOMATED COUNT: 13.3 % (ref 11.6–15.1)
EST. AVERAGE GLUCOSE BLD GHB EST-MCNC: 154 MG/DL
GFR SERPL CREATININE-BSD FRML MDRD: 79 ML/MIN/1.73SQ M
GLUCOSE SERPL-MCNC: 130 MG/DL (ref 65–140)
GLUCOSE SERPL-MCNC: 134 MG/DL (ref 65–140)
GLUCOSE SERPL-MCNC: 144 MG/DL (ref 65–140)
GLUCOSE SERPL-MCNC: 184 MG/DL (ref 65–140)
GLUCOSE SERPL-MCNC: 214 MG/DL (ref 65–140)
HBA1C MFR BLD: 7 % (ref 4.2–6.3)
HCT VFR BLD AUTO: 33.9 % (ref 34.8–46.1)
HGB BLD-MCNC: 11 G/DL (ref 11.5–15.4)
LIPASE SERPL-CCNC: 71 U/L (ref 73–393)
MAGNESIUM SERPL-MCNC: 1.5 MG/DL (ref 1.6–2.6)
MCH RBC QN AUTO: 30.1 PG (ref 26.8–34.3)
MCHC RBC AUTO-ENTMCNC: 32.4 G/DL (ref 31.4–37.4)
MCV RBC AUTO: 93 FL (ref 82–98)
P AXIS: 69 DEGREES
PLATELET # BLD AUTO: 265 THOUSANDS/UL (ref 149–390)
PMV BLD AUTO: 10.1 FL (ref 8.9–12.7)
POTASSIUM SERPL-SCNC: 3.6 MMOL/L (ref 3.5–5.3)
PR INTERVAL: 116 MS
PROT SERPL-MCNC: 6 G/DL (ref 6.4–8.2)
QRS AXIS: 74 DEGREES
QRSD INTERVAL: 74 MS
QT INTERVAL: 292 MS
QTC INTERVAL: 445 MS
RBC # BLD AUTO: 3.66 MILLION/UL (ref 3.81–5.12)
SODIUM SERPL-SCNC: 137 MMOL/L (ref 136–145)
T WAVE AXIS: 61 DEGREES
VENTRICULAR RATE: 140 BPM
WBC # BLD AUTO: 8.24 THOUSAND/UL (ref 4.31–10.16)

## 2017-09-10 PROCEDURE — 85027 COMPLETE CBC AUTOMATED: CPT | Performed by: PHYSICIAN ASSISTANT

## 2017-09-10 PROCEDURE — 82948 REAGENT STRIP/BLOOD GLUCOSE: CPT

## 2017-09-10 PROCEDURE — 74177 CT ABD & PELVIS W/CONTRAST: CPT

## 2017-09-10 PROCEDURE — 80053 COMPREHEN METABOLIC PANEL: CPT | Performed by: PHYSICIAN ASSISTANT

## 2017-09-10 PROCEDURE — 87798 DETECT AGENT NOS DNA AMP: CPT | Performed by: INTERNAL MEDICINE

## 2017-09-10 PROCEDURE — 83036 HEMOGLOBIN GLYCOSYLATED A1C: CPT | Performed by: PHYSICIAN ASSISTANT

## 2017-09-10 PROCEDURE — 83690 ASSAY OF LIPASE: CPT | Performed by: PHYSICIAN ASSISTANT

## 2017-09-10 PROCEDURE — 83735 ASSAY OF MAGNESIUM: CPT | Performed by: PHYSICIAN ASSISTANT

## 2017-09-10 RX ORDER — MAGNESIUM SULFATE 1 G/100ML
1 INJECTION INTRAVENOUS ONCE
Status: COMPLETED | OUTPATIENT
Start: 2017-09-10 | End: 2017-09-10

## 2017-09-10 RX ORDER — SODIUM CHLORIDE 9 MG/ML
100 INJECTION, SOLUTION INTRAVENOUS CONTINUOUS
Status: DISCONTINUED | OUTPATIENT
Start: 2017-09-10 | End: 2017-09-12

## 2017-09-10 RX ADMIN — INSULIN LISPRO 1 UNITS: 100 INJECTION, SOLUTION INTRAVENOUS; SUBCUTANEOUS at 12:05

## 2017-09-10 RX ADMIN — BENZONATATE 200 MG: 100 CAPSULE ORAL at 17:25

## 2017-09-10 RX ADMIN — HYDROCODONE POLISTIREX AND CHLORPHENIRAMINE POLISTIREX 5 ML: 10; 8 SUSPENSION, EXTENDED RELEASE ORAL at 23:24

## 2017-09-10 RX ADMIN — ENOXAPARIN SODIUM 40 MG: 40 INJECTION SUBCUTANEOUS at 08:40

## 2017-09-10 RX ADMIN — ALPRAZOLAM 0.25 MG: 0.25 TABLET ORAL at 12:04

## 2017-09-10 RX ADMIN — HYDROCODONE POLISTIREX AND CHLORPHENIRAMINE POLISTIREX 5 ML: 10; 8 SUSPENSION, EXTENDED RELEASE ORAL at 13:49

## 2017-09-10 RX ADMIN — INSULIN LISPRO 2 UNITS: 100 INJECTION, SOLUTION INTRAVENOUS; SUBCUTANEOUS at 17:27

## 2017-09-10 RX ADMIN — IOHEXOL 100 ML: 350 INJECTION, SOLUTION INTRAVENOUS at 17:01

## 2017-09-10 RX ADMIN — FLUTICASONE PROPIONATE AND SALMETEROL 1 PUFF: 50; 100 POWDER RESPIRATORY (INHALATION) at 00:04

## 2017-09-10 RX ADMIN — BENZONATATE 200 MG: 100 CAPSULE ORAL at 08:40

## 2017-09-10 RX ADMIN — ALPRAZOLAM 0.25 MG: 0.25 TABLET ORAL at 22:01

## 2017-09-10 RX ADMIN — PRAVASTATIN SODIUM 40 MG: 40 TABLET ORAL at 17:25

## 2017-09-10 RX ADMIN — BENZONATATE 200 MG: 100 CAPSULE ORAL at 22:01

## 2017-09-10 RX ADMIN — PANTOPRAZOLE SODIUM 40 MG: 40 TABLET, DELAYED RELEASE ORAL at 08:40

## 2017-09-10 RX ADMIN — LEVOFLOXACIN 750 MG: 500 TABLET, FILM COATED ORAL at 22:01

## 2017-09-10 RX ADMIN — LEVOFLOXACIN 750 MG: 5 INJECTION, SOLUTION INTRAVENOUS at 00:05

## 2017-09-10 RX ADMIN — ACETAMINOPHEN 650 MG: 325 TABLET ORAL at 12:04

## 2017-09-10 RX ADMIN — FLUTICASONE PROPIONATE AND SALMETEROL 1 PUFF: 50; 100 POWDER RESPIRATORY (INHALATION) at 22:02

## 2017-09-10 RX ADMIN — SODIUM CHLORIDE 100 ML/HR: 0.9 INJECTION, SOLUTION INTRAVENOUS at 17:26

## 2017-09-10 RX ADMIN — INSULIN LISPRO 2 UNITS: 100 INJECTION, SOLUTION INTRAVENOUS; SUBCUTANEOUS at 00:05

## 2017-09-10 RX ADMIN — ONDANSETRON 4 MG: 2 INJECTION INTRAMUSCULAR; INTRAVENOUS at 05:01

## 2017-09-10 RX ADMIN — HYDROCODONE POLISTIREX AND CHLORPHENIRAMINE POLISTIREX 5 ML: 10; 8 SUSPENSION, EXTENDED RELEASE ORAL at 01:33

## 2017-09-10 RX ADMIN — FLUTICASONE PROPIONATE AND SALMETEROL 1 PUFF: 50; 100 POWDER RESPIRATORY (INHALATION) at 08:40

## 2017-09-10 RX ADMIN — MAGNESIUM SULFATE HEPTAHYDRATE 1 G: 1 INJECTION, SOLUTION INTRAVENOUS at 22:14

## 2017-09-11 PROBLEM — J96.01 ACUTE RESPIRATORY FAILURE WITH HYPOXIA (HCC): Status: ACTIVE | Noted: 2017-09-11

## 2017-09-11 PROBLEM — R19.7 DIARRHEA: Status: RESOLVED | Noted: 2017-09-09 | Resolved: 2017-09-11

## 2017-09-11 PROBLEM — A41.9 SEPSIS (HCC): Status: ACTIVE | Noted: 2017-09-09

## 2017-09-11 LAB
ANION GAP SERPL CALCULATED.3IONS-SCNC: 6 MMOL/L (ref 4–13)
BUN SERPL-MCNC: 3 MG/DL (ref 5–25)
C DIFF TOX GENS STL QL NAA+PROBE: NORMAL
CALCIUM SERPL-MCNC: 8.6 MG/DL (ref 8.3–10.1)
CHLORIDE SERPL-SCNC: 102 MMOL/L (ref 100–108)
CO2 SERPL-SCNC: 29 MMOL/L (ref 21–32)
CREAT SERPL-MCNC: 0.74 MG/DL (ref 0.6–1.3)
ERYTHROCYTE [DISTWIDTH] IN BLOOD BY AUTOMATED COUNT: 13.7 % (ref 11.6–15.1)
FLUAV AG SPEC QL: NORMAL
FLUBV AG SPEC QL: NORMAL
GFR SERPL CREATININE-BSD FRML MDRD: 84 ML/MIN/1.73SQ M
GLUCOSE SERPL-MCNC: 118 MG/DL (ref 65–140)
GLUCOSE SERPL-MCNC: 134 MG/DL (ref 65–140)
GLUCOSE SERPL-MCNC: 148 MG/DL (ref 65–140)
GLUCOSE SERPL-MCNC: 159 MG/DL (ref 65–140)
GLUCOSE SERPL-MCNC: 172 MG/DL (ref 65–140)
HCT VFR BLD AUTO: 33.3 % (ref 34.8–46.1)
HGB BLD-MCNC: 10.8 G/DL (ref 11.5–15.4)
MAGNESIUM SERPL-MCNC: 1.8 MG/DL (ref 1.6–2.6)
MCH RBC QN AUTO: 30.3 PG (ref 26.8–34.3)
MCHC RBC AUTO-ENTMCNC: 32.4 G/DL (ref 31.4–37.4)
MCV RBC AUTO: 94 FL (ref 82–98)
PLATELET # BLD AUTO: 264 THOUSANDS/UL (ref 149–390)
PMV BLD AUTO: 9.4 FL (ref 8.9–12.7)
POTASSIUM SERPL-SCNC: 3.9 MMOL/L (ref 3.5–5.3)
RBC # BLD AUTO: 3.56 MILLION/UL (ref 3.81–5.12)
RSV B RNA SPEC QL NAA+PROBE: NORMAL
SODIUM SERPL-SCNC: 137 MMOL/L (ref 136–145)
WBC # BLD AUTO: 9.57 THOUSAND/UL (ref 4.31–10.16)

## 2017-09-11 PROCEDURE — 94760 N-INVAS EAR/PLS OXIMETRY 1: CPT

## 2017-09-11 PROCEDURE — 87493 C DIFF AMPLIFIED PROBE: CPT | Performed by: EMERGENCY MEDICINE

## 2017-09-11 PROCEDURE — 87046 STOOL CULTR AEROBIC BACT EA: CPT | Performed by: PHYSICIAN ASSISTANT

## 2017-09-11 PROCEDURE — 94664 DEMO&/EVAL PT USE INHALER: CPT

## 2017-09-11 PROCEDURE — 89055 LEUKOCYTE ASSESSMENT FECAL: CPT | Performed by: PHYSICIAN ASSISTANT

## 2017-09-11 PROCEDURE — 83735 ASSAY OF MAGNESIUM: CPT | Performed by: INTERNAL MEDICINE

## 2017-09-11 PROCEDURE — 87045 FECES CULTURE AEROBIC BACT: CPT | Performed by: PHYSICIAN ASSISTANT

## 2017-09-11 PROCEDURE — 94640 AIRWAY INHALATION TREATMENT: CPT

## 2017-09-11 PROCEDURE — 82948 REAGENT STRIP/BLOOD GLUCOSE: CPT

## 2017-09-11 PROCEDURE — 87015 SPECIMEN INFECT AGNT CONCNTJ: CPT | Performed by: PHYSICIAN ASSISTANT

## 2017-09-11 PROCEDURE — 85027 COMPLETE CBC AUTOMATED: CPT | Performed by: INTERNAL MEDICINE

## 2017-09-11 PROCEDURE — 80048 BASIC METABOLIC PNL TOTAL CA: CPT | Performed by: INTERNAL MEDICINE

## 2017-09-11 RX ORDER — LEVALBUTEROL 1.25 MG/.5ML
1.25 SOLUTION, CONCENTRATE RESPIRATORY (INHALATION)
Status: DISCONTINUED | OUTPATIENT
Start: 2017-09-12 | End: 2017-09-14 | Stop reason: HOSPADM

## 2017-09-11 RX ORDER — GUAIFENESIN 600 MG
1200 TABLET, EXTENDED RELEASE 12 HR ORAL EVERY 12 HOURS SCHEDULED
Status: DISCONTINUED | OUTPATIENT
Start: 2017-09-11 | End: 2017-09-11

## 2017-09-11 RX ORDER — SODIUM CHLORIDE FOR INHALATION 0.9 %
3 VIAL, NEBULIZER (ML) INHALATION
Status: DISCONTINUED | OUTPATIENT
Start: 2017-09-12 | End: 2017-09-13

## 2017-09-11 RX ADMIN — PRAVASTATIN SODIUM 40 MG: 40 TABLET ORAL at 15:43

## 2017-09-11 RX ADMIN — FLUTICASONE PROPIONATE AND SALMETEROL 1 PUFF: 50; 100 POWDER RESPIRATORY (INHALATION) at 21:03

## 2017-09-11 RX ADMIN — LEVOFLOXACIN 750 MG: 500 TABLET, FILM COATED ORAL at 21:01

## 2017-09-11 RX ADMIN — BENZONATATE 200 MG: 100 CAPSULE ORAL at 21:00

## 2017-09-11 RX ADMIN — PANTOPRAZOLE SODIUM 40 MG: 40 TABLET, DELAYED RELEASE ORAL at 08:23

## 2017-09-11 RX ADMIN — INSULIN LISPRO 1 UNITS: 100 INJECTION, SOLUTION INTRAVENOUS; SUBCUTANEOUS at 17:44

## 2017-09-11 RX ADMIN — ACETAMINOPHEN 650 MG: 325 TABLET ORAL at 16:02

## 2017-09-11 RX ADMIN — BENZONATATE 200 MG: 100 CAPSULE ORAL at 15:43

## 2017-09-11 RX ADMIN — ENOXAPARIN SODIUM 40 MG: 40 INJECTION SUBCUTANEOUS at 08:24

## 2017-09-11 RX ADMIN — INSULIN LISPRO 1 UNITS: 100 INJECTION, SOLUTION INTRAVENOUS; SUBCUTANEOUS at 13:20

## 2017-09-11 RX ADMIN — FLUTICASONE PROPIONATE AND SALMETEROL 1 PUFF: 50; 100 POWDER RESPIRATORY (INHALATION) at 08:24

## 2017-09-11 RX ADMIN — BENZONATATE 200 MG: 100 CAPSULE ORAL at 08:23

## 2017-09-11 RX ADMIN — HYDROCODONE POLISTIREX AND CHLORPHENIRAMINE POLISTIREX 5 ML: 10; 8 SUSPENSION, EXTENDED RELEASE ORAL at 13:18

## 2017-09-11 RX ADMIN — ALBUTEROL SULFATE 2.5 MG: 2.5 SOLUTION RESPIRATORY (INHALATION) at 23:04

## 2017-09-11 RX ADMIN — ACETAMINOPHEN 650 MG: 325 TABLET ORAL at 23:02

## 2017-09-12 PROBLEM — E83.42 HYPOMAGNESEMIA: Status: RESOLVED | Noted: 2017-09-10 | Resolved: 2017-09-12

## 2017-09-12 LAB
BACTERIA UR CULT: NORMAL
GLUCOSE SERPL-MCNC: 140 MG/DL (ref 65–140)
GLUCOSE SERPL-MCNC: 169 MG/DL (ref 65–140)
GLUCOSE SERPL-MCNC: 180 MG/DL (ref 65–140)
GLUCOSE SERPL-MCNC: 190 MG/DL (ref 65–140)

## 2017-09-12 PROCEDURE — 94640 AIRWAY INHALATION TREATMENT: CPT

## 2017-09-12 PROCEDURE — 82948 REAGENT STRIP/BLOOD GLUCOSE: CPT

## 2017-09-12 PROCEDURE — 94760 N-INVAS EAR/PLS OXIMETRY 1: CPT

## 2017-09-12 RX ORDER — BUDESONIDE 0.5 MG/2ML
0.5 INHALANT ORAL
Status: DISCONTINUED | OUTPATIENT
Start: 2017-09-12 | End: 2017-09-14 | Stop reason: HOSPADM

## 2017-09-12 RX ORDER — FLUTICASONE PROPIONATE 50 MCG
1 SPRAY, SUSPENSION (ML) NASAL DAILY
Status: DISCONTINUED | OUTPATIENT
Start: 2017-09-12 | End: 2017-09-14 | Stop reason: HOSPADM

## 2017-09-12 RX ADMIN — LEVOFLOXACIN 750 MG: 500 TABLET, FILM COATED ORAL at 21:38

## 2017-09-12 RX ADMIN — BENZONATATE 200 MG: 100 CAPSULE ORAL at 08:55

## 2017-09-12 RX ADMIN — LEVALBUTEROL HYDROCHLORIDE 1.25 MG: 1.25 SOLUTION, CONCENTRATE RESPIRATORY (INHALATION) at 07:57

## 2017-09-12 RX ADMIN — FLUTICASONE PROPIONATE AND SALMETEROL 1 PUFF: 50; 100 POWDER RESPIRATORY (INHALATION) at 08:56

## 2017-09-12 RX ADMIN — PRAVASTATIN SODIUM 40 MG: 40 TABLET ORAL at 17:57

## 2017-09-12 RX ADMIN — PANTOPRAZOLE SODIUM 40 MG: 40 TABLET, DELAYED RELEASE ORAL at 08:55

## 2017-09-12 RX ADMIN — ACETAMINOPHEN 650 MG: 325 TABLET ORAL at 15:50

## 2017-09-12 RX ADMIN — FLUTICASONE PROPIONATE 1 SPRAY: 50 SPRAY, METERED NASAL at 14:12

## 2017-09-12 RX ADMIN — ONDANSETRON 4 MG: 2 INJECTION INTRAMUSCULAR; INTRAVENOUS at 05:04

## 2017-09-12 RX ADMIN — IPRATROPIUM BROMIDE 0.5 MG: 0.5 SOLUTION RESPIRATORY (INHALATION) at 19:43

## 2017-09-12 RX ADMIN — ISODIUM CHLORIDE 3 ML: 0.03 SOLUTION RESPIRATORY (INHALATION) at 07:57

## 2017-09-12 RX ADMIN — ACETAMINOPHEN 650 MG: 325 TABLET ORAL at 08:54

## 2017-09-12 RX ADMIN — BENZONATATE 200 MG: 100 CAPSULE ORAL at 20:43

## 2017-09-12 RX ADMIN — ONDANSETRON 4 MG: 2 INJECTION INTRAMUSCULAR; INTRAVENOUS at 08:55

## 2017-09-12 RX ADMIN — BENZONATATE 200 MG: 100 CAPSULE ORAL at 15:50

## 2017-09-12 RX ADMIN — ISODIUM CHLORIDE 3 ML: 0.03 SOLUTION RESPIRATORY (INHALATION) at 13:36

## 2017-09-12 RX ADMIN — ONDANSETRON 4 MG: 2 INJECTION INTRAMUSCULAR; INTRAVENOUS at 15:42

## 2017-09-12 RX ADMIN — LEVALBUTEROL HYDROCHLORIDE 1.25 MG: 1.25 SOLUTION, CONCENTRATE RESPIRATORY (INHALATION) at 19:43

## 2017-09-12 RX ADMIN — BUDESONIDE 0.5 MG: 0.5 INHALANT RESPIRATORY (INHALATION) at 19:43

## 2017-09-12 RX ADMIN — LEVALBUTEROL HYDROCHLORIDE 1.25 MG: 1.25 SOLUTION, CONCENTRATE RESPIRATORY (INHALATION) at 13:36

## 2017-09-12 RX ADMIN — INSULIN LISPRO 1 UNITS: 100 INJECTION, SOLUTION INTRAVENOUS; SUBCUTANEOUS at 11:33

## 2017-09-12 RX ADMIN — INSULIN LISPRO 1 UNITS: 100 INJECTION, SOLUTION INTRAVENOUS; SUBCUTANEOUS at 17:57

## 2017-09-12 RX ADMIN — ONDANSETRON 4 MG: 2 INJECTION INTRAMUSCULAR; INTRAVENOUS at 23:53

## 2017-09-12 RX ADMIN — INSULIN LISPRO 1 UNITS: 100 INJECTION, SOLUTION INTRAVENOUS; SUBCUTANEOUS at 21:38

## 2017-09-12 RX ADMIN — ENOXAPARIN SODIUM 40 MG: 40 INJECTION SUBCUTANEOUS at 08:55

## 2017-09-12 RX ADMIN — HYDROCODONE POLISTIREX AND CHLORPHENIRAMINE POLISTIREX 5 ML: 10; 8 SUSPENSION, EXTENDED RELEASE ORAL at 12:49

## 2017-09-13 LAB
BACTERIA STL CULT: NORMAL
BACTERIA STL CULT: NORMAL
GLUCOSE SERPL-MCNC: 124 MG/DL (ref 65–140)
GLUCOSE SERPL-MCNC: 126 MG/DL (ref 65–140)
GLUCOSE SERPL-MCNC: 181 MG/DL (ref 65–140)
GLUCOSE SERPL-MCNC: 222 MG/DL (ref 65–140)

## 2017-09-13 PROCEDURE — 94640 AIRWAY INHALATION TREATMENT: CPT

## 2017-09-13 PROCEDURE — 82948 REAGENT STRIP/BLOOD GLUCOSE: CPT

## 2017-09-13 PROCEDURE — 94760 N-INVAS EAR/PLS OXIMETRY 1: CPT

## 2017-09-13 RX ORDER — METOCLOPRAMIDE HYDROCHLORIDE 5 MG/ML
5 INJECTION INTRAMUSCULAR; INTRAVENOUS EVERY 6 HOURS PRN
Status: DISCONTINUED | OUTPATIENT
Start: 2017-09-13 | End: 2017-09-14 | Stop reason: HOSPADM

## 2017-09-13 RX ADMIN — LEVALBUTEROL HYDROCHLORIDE 1.25 MG: 1.25 SOLUTION, CONCENTRATE RESPIRATORY (INHALATION) at 14:04

## 2017-09-13 RX ADMIN — ALPRAZOLAM 0.25 MG: 0.25 TABLET ORAL at 18:00

## 2017-09-13 RX ADMIN — BENZONATATE 200 MG: 100 CAPSULE ORAL at 17:34

## 2017-09-13 RX ADMIN — IPRATROPIUM BROMIDE 0.5 MG: 0.5 SOLUTION RESPIRATORY (INHALATION) at 08:38

## 2017-09-13 RX ADMIN — HYDROCODONE POLISTIREX AND CHLORPHENIRAMINE POLISTIREX 5 ML: 10; 8 SUSPENSION, EXTENDED RELEASE ORAL at 17:42

## 2017-09-13 RX ADMIN — LEVALBUTEROL HYDROCHLORIDE 1.25 MG: 1.25 SOLUTION, CONCENTRATE RESPIRATORY (INHALATION) at 08:41

## 2017-09-13 RX ADMIN — BENZONATATE 200 MG: 100 CAPSULE ORAL at 08:17

## 2017-09-13 RX ADMIN — INSULIN LISPRO 1 UNITS: 100 INJECTION, SOLUTION INTRAVENOUS; SUBCUTANEOUS at 21:27

## 2017-09-13 RX ADMIN — BUDESONIDE 0.5 MG: 0.5 INHALANT RESPIRATORY (INHALATION) at 20:04

## 2017-09-13 RX ADMIN — ACETAMINOPHEN 650 MG: 325 TABLET ORAL at 08:19

## 2017-09-13 RX ADMIN — LEVALBUTEROL HYDROCHLORIDE 1.25 MG: 1.25 SOLUTION, CONCENTRATE RESPIRATORY (INHALATION) at 20:04

## 2017-09-13 RX ADMIN — BUDESONIDE 0.5 MG: 0.5 INHALANT RESPIRATORY (INHALATION) at 08:38

## 2017-09-13 RX ADMIN — LEVOFLOXACIN 750 MG: 500 TABLET, FILM COATED ORAL at 21:26

## 2017-09-13 RX ADMIN — ENOXAPARIN SODIUM 40 MG: 40 INJECTION SUBCUTANEOUS at 08:17

## 2017-09-13 RX ADMIN — ALPRAZOLAM 0.25 MG: 0.25 TABLET ORAL at 01:26

## 2017-09-13 RX ADMIN — IPRATROPIUM BROMIDE 0.5 MG: 0.5 SOLUTION RESPIRATORY (INHALATION) at 14:04

## 2017-09-13 RX ADMIN — BENZONATATE 200 MG: 100 CAPSULE ORAL at 21:25

## 2017-09-13 RX ADMIN — PANTOPRAZOLE SODIUM 40 MG: 40 TABLET, DELAYED RELEASE ORAL at 08:17

## 2017-09-13 RX ADMIN — PRAVASTATIN SODIUM 40 MG: 40 TABLET ORAL at 17:34

## 2017-09-13 RX ADMIN — IPRATROPIUM BROMIDE 0.5 MG: 0.5 SOLUTION RESPIRATORY (INHALATION) at 20:04

## 2017-09-13 RX ADMIN — METOCLOPRAMIDE 5 MG: 5 INJECTION, SOLUTION INTRAMUSCULAR; INTRAVENOUS at 17:42

## 2017-09-13 RX ADMIN — INSULIN LISPRO 2 UNITS: 100 INJECTION, SOLUTION INTRAVENOUS; SUBCUTANEOUS at 12:44

## 2017-09-13 RX ADMIN — METOCLOPRAMIDE 5 MG: 5 INJECTION, SOLUTION INTRAMUSCULAR; INTRAVENOUS at 01:21

## 2017-09-13 RX ADMIN — ACETAMINOPHEN 650 MG: 325 TABLET ORAL at 17:42

## 2017-09-14 VITALS
TEMPERATURE: 98.3 F | DIASTOLIC BLOOD PRESSURE: 74 MMHG | SYSTOLIC BLOOD PRESSURE: 120 MMHG | WEIGHT: 155.65 LBS | HEART RATE: 100 BPM | HEIGHT: 59 IN | RESPIRATION RATE: 18 BRPM | BODY MASS INDEX: 31.38 KG/M2 | OXYGEN SATURATION: 91 %

## 2017-09-14 PROBLEM — J21.9 BRONCHIOLITIS: Status: ACTIVE | Noted: 2017-09-14

## 2017-09-14 PROBLEM — A41.9 SEPSIS (HCC): Status: RESOLVED | Noted: 2017-09-09 | Resolved: 2017-09-14

## 2017-09-14 PROBLEM — J96.01 ACUTE RESPIRATORY FAILURE WITH HYPOXIA (HCC): Status: RESOLVED | Noted: 2017-09-11 | Resolved: 2017-09-14

## 2017-09-14 LAB
GLUCOSE SERPL-MCNC: 121 MG/DL (ref 65–140)
GLUCOSE SERPL-MCNC: 213 MG/DL (ref 65–140)
WBC SPEC QL GRAM STN: NORMAL

## 2017-09-14 PROCEDURE — 94640 AIRWAY INHALATION TREATMENT: CPT

## 2017-09-14 PROCEDURE — 94760 N-INVAS EAR/PLS OXIMETRY 1: CPT

## 2017-09-14 PROCEDURE — 82948 REAGENT STRIP/BLOOD GLUCOSE: CPT

## 2017-09-14 RX ORDER — ONDANSETRON 4 MG/1
4 TABLET, ORALLY DISINTEGRATING ORAL EVERY 6 HOURS PRN
Qty: 20 TABLET | Refills: 0 | Status: SHIPPED | OUTPATIENT
Start: 2017-09-14 | End: 2017-09-14

## 2017-09-14 RX ORDER — ONDANSETRON 4 MG/1
4 TABLET, FILM COATED ORAL EVERY 8 HOURS PRN
Qty: 20 TABLET | Refills: 0 | Status: SHIPPED | OUTPATIENT
Start: 2017-09-14 | End: 2017-09-14

## 2017-09-14 RX ORDER — FLUTICASONE PROPIONATE 50 MCG
1 SPRAY, SUSPENSION (ML) NASAL DAILY
Qty: 16 G | Refills: 0 | Status: SHIPPED | OUTPATIENT
Start: 2017-09-14 | End: 2017-09-14

## 2017-09-14 RX ORDER — FLUTICASONE PROPIONATE 50 MCG
1 SPRAY, SUSPENSION (ML) NASAL DAILY
Qty: 16 G | Refills: 0 | Status: SHIPPED | OUTPATIENT
Start: 2017-09-14 | End: 2018-04-11 | Stop reason: ALTCHOICE

## 2017-09-14 RX ORDER — HYDROCODONE POLISTIREX AND CHLORPHENIRAMINE POLISTIREX 10; 8 MG/5ML; MG/5ML
5 SUSPENSION, EXTENDED RELEASE ORAL EVERY 12 HOURS PRN
Qty: 120 ML | Refills: 0 | Status: SHIPPED | OUTPATIENT
Start: 2017-09-14 | End: 2017-09-24

## 2017-09-14 RX ORDER — ONDANSETRON 4 MG/1
4 TABLET, FILM COATED ORAL EVERY 8 HOURS PRN
Qty: 20 TABLET | Refills: 0 | Status: SHIPPED | OUTPATIENT
Start: 2017-09-14 | End: 2018-04-11 | Stop reason: ALTCHOICE

## 2017-09-14 RX ORDER — HYDROCODONE POLISTIREX AND CHLORPHENIRAMINE POLISTIREX 10; 8 MG/5ML; MG/5ML
5 SUSPENSION, EXTENDED RELEASE ORAL EVERY 12 HOURS PRN
Qty: 120 ML | Refills: 0 | Status: SHIPPED | OUTPATIENT
Start: 2017-09-14 | End: 2017-09-14

## 2017-09-14 RX ORDER — BUDESONIDE 0.5 MG/2ML
0.5 INHALANT ORAL
Qty: 60 ML | Refills: 0
Start: 2017-09-14 | End: 2018-04-11 | Stop reason: ALTCHOICE

## 2017-09-14 RX ORDER — ONDANSETRON 4 MG/1
4 TABLET, ORALLY DISINTEGRATING ORAL EVERY 6 HOURS PRN
Qty: 20 TABLET | Refills: 0 | Status: SHIPPED | OUTPATIENT
Start: 2017-09-14 | End: 2018-04-11 | Stop reason: ALTCHOICE

## 2017-09-14 RX ADMIN — FLUTICASONE PROPIONATE 1 SPRAY: 50 SPRAY, METERED NASAL at 08:38

## 2017-09-14 RX ADMIN — BENZONATATE 200 MG: 100 CAPSULE ORAL at 08:30

## 2017-09-14 RX ADMIN — HYDROCODONE POLISTIREX AND CHLORPHENIRAMINE POLISTIREX 5 ML: 10; 8 SUSPENSION, EXTENDED RELEASE ORAL at 05:27

## 2017-09-14 RX ADMIN — ONDANSETRON 4 MG: 2 INJECTION INTRAMUSCULAR; INTRAVENOUS at 08:30

## 2017-09-14 RX ADMIN — BUDESONIDE 0.5 MG: 0.5 INHALANT RESPIRATORY (INHALATION) at 08:18

## 2017-09-14 RX ADMIN — LEVALBUTEROL HYDROCHLORIDE 1.25 MG: 1.25 SOLUTION, CONCENTRATE RESPIRATORY (INHALATION) at 08:17

## 2017-09-14 RX ADMIN — ENOXAPARIN SODIUM 40 MG: 40 INJECTION SUBCUTANEOUS at 08:30

## 2017-09-14 RX ADMIN — ACETAMINOPHEN 650 MG: 325 TABLET ORAL at 08:38

## 2017-09-14 RX ADMIN — PANTOPRAZOLE SODIUM 40 MG: 40 TABLET, DELAYED RELEASE ORAL at 08:30

## 2017-09-14 RX ADMIN — IPRATROPIUM BROMIDE 0.5 MG: 0.5 SOLUTION RESPIRATORY (INHALATION) at 08:17

## 2017-09-15 LAB
BACTERIA BLD CULT: NORMAL
BACTERIA BLD CULT: NORMAL

## 2017-09-20 ENCOUNTER — GENERIC CONVERSION - ENCOUNTER (OUTPATIENT)
Dept: OTHER | Facility: OTHER | Age: 67
End: 2017-09-20

## 2017-09-27 ENCOUNTER — GENERIC CONVERSION - ENCOUNTER (OUTPATIENT)
Dept: OTHER | Facility: OTHER | Age: 67
End: 2017-09-27

## 2017-09-27 ENCOUNTER — HOSPITAL ENCOUNTER (OUTPATIENT)
Dept: PULMONOLOGY | Facility: HOSPITAL | Age: 67
Discharge: HOME | End: 2017-09-27
Payer: MEDICARE

## 2017-09-27 DIAGNOSIS — J44.9 COPD (CHRONIC OBSTRUCTIVE PULMONARY DISEASE) (HCC): Chronic | ICD-10-CM

## 2017-09-27 DIAGNOSIS — J44.9 CHRONIC OBSTRUCTIVE PULMONARY DISEASE, UNSPECIFIED COPD TYPE (HCC): Chronic | ICD-10-CM

## 2017-09-27 PROCEDURE — 94760 N-INVAS EAR/PLS OXIMETRY 1: CPT

## 2017-09-27 PROCEDURE — 94726 PLETHYSMOGRAPHY LUNG VOLUMES: CPT

## 2017-09-27 PROCEDURE — 94060 EVALUATION OF WHEEZING: CPT

## 2017-09-27 PROCEDURE — 94729 DIFFUSING CAPACITY: CPT

## 2017-09-27 RX ORDER — ALBUTEROL SULFATE 2.5 MG/3ML
2.5 SOLUTION RESPIRATORY (INHALATION) ONCE
Status: COMPLETED | OUTPATIENT
Start: 2017-09-27 | End: 2017-09-27

## 2017-09-27 RX ADMIN — ALBUTEROL SULFATE 2.5 MG: 2.5 SOLUTION RESPIRATORY (INHALATION) at 13:56

## 2017-10-04 ENCOUNTER — TRANSCRIBE ORDERS (OUTPATIENT)
Dept: PULMONOLOGY | Facility: CLINIC | Age: 67
End: 2017-10-04

## 2017-10-04 DIAGNOSIS — J44.9 CHRONIC OBSTRUCTIVE PULMONARY DISEASE, UNSPECIFIED COPD TYPE (HCC): Primary | ICD-10-CM

## 2017-10-10 ENCOUNTER — ALLSCRIPTS OFFICE VISIT (OUTPATIENT)
Dept: OTHER | Facility: OTHER | Age: 67
End: 2017-10-10

## 2017-10-10 DIAGNOSIS — E11.9 TYPE 2 DIABETES MELLITUS WITHOUT COMPLICATIONS (HCC): ICD-10-CM

## 2017-10-10 DIAGNOSIS — E78.5 HYPERLIPIDEMIA: ICD-10-CM

## 2017-10-10 DIAGNOSIS — Z12.31 ENCOUNTER FOR SCREENING MAMMOGRAM FOR MALIGNANT NEOPLASM OF BREAST: ICD-10-CM

## 2017-10-16 ENCOUNTER — APPOINTMENT (OUTPATIENT)
Dept: PULMONOLOGY | Facility: CLINIC | Age: 67
End: 2017-10-16
Payer: MEDICARE

## 2017-10-16 PROCEDURE — G0424 PULMONARY REHAB W EXER: HCPCS

## 2017-10-18 ENCOUNTER — APPOINTMENT (OUTPATIENT)
Dept: PULMONOLOGY | Facility: CLINIC | Age: 67
End: 2017-10-18
Payer: MEDICARE

## 2017-10-18 PROCEDURE — G0424 PULMONARY REHAB W EXER: HCPCS

## 2017-10-23 ENCOUNTER — APPOINTMENT (OUTPATIENT)
Dept: PULMONOLOGY | Facility: CLINIC | Age: 67
End: 2017-10-23
Payer: MEDICARE

## 2017-10-23 PROCEDURE — G0424 PULMONARY REHAB W EXER: HCPCS

## 2017-10-24 ENCOUNTER — GENERIC CONVERSION - ENCOUNTER (OUTPATIENT)
Dept: OTHER | Facility: OTHER | Age: 67
End: 2017-10-24

## 2017-10-25 ENCOUNTER — APPOINTMENT (OUTPATIENT)
Dept: PULMONOLOGY | Facility: CLINIC | Age: 67
End: 2017-10-25
Payer: MEDICARE

## 2017-10-25 PROCEDURE — G0424 PULMONARY REHAB W EXER: HCPCS

## 2017-10-30 ENCOUNTER — APPOINTMENT (OUTPATIENT)
Dept: PULMONOLOGY | Facility: CLINIC | Age: 67
End: 2017-10-30
Payer: MEDICARE

## 2017-10-30 PROCEDURE — G0424 PULMONARY REHAB W EXER: HCPCS

## 2017-11-01 ENCOUNTER — APPOINTMENT (OUTPATIENT)
Dept: PULMONOLOGY | Facility: CLINIC | Age: 67
End: 2017-11-01
Payer: MEDICARE

## 2017-11-01 PROCEDURE — G0424 PULMONARY REHAB W EXER: HCPCS

## 2017-11-06 ENCOUNTER — APPOINTMENT (OUTPATIENT)
Dept: PULMONOLOGY | Facility: CLINIC | Age: 67
End: 2017-11-06
Payer: MEDICARE

## 2017-11-06 PROCEDURE — G0424 PULMONARY REHAB W EXER: HCPCS

## 2017-11-08 ENCOUNTER — APPOINTMENT (OUTPATIENT)
Dept: PULMONOLOGY | Facility: CLINIC | Age: 67
End: 2017-11-08
Payer: MEDICARE

## 2017-11-08 PROCEDURE — G0424 PULMONARY REHAB W EXER: HCPCS

## 2017-11-13 ENCOUNTER — APPOINTMENT (OUTPATIENT)
Dept: PULMONOLOGY | Facility: CLINIC | Age: 67
End: 2017-11-13
Payer: MEDICARE

## 2017-11-15 ENCOUNTER — APPOINTMENT (OUTPATIENT)
Dept: PULMONOLOGY | Facility: CLINIC | Age: 67
End: 2017-11-15
Payer: MEDICARE

## 2017-11-15 PROCEDURE — G0424 PULMONARY REHAB W EXER: HCPCS

## 2017-11-20 ENCOUNTER — APPOINTMENT (OUTPATIENT)
Dept: PULMONOLOGY | Facility: CLINIC | Age: 67
End: 2017-11-20
Payer: MEDICARE

## 2017-11-20 PROCEDURE — G0424 PULMONARY REHAB W EXER: HCPCS

## 2017-11-22 ENCOUNTER — APPOINTMENT (OUTPATIENT)
Dept: PULMONOLOGY | Facility: CLINIC | Age: 67
End: 2017-11-22
Payer: MEDICARE

## 2017-11-22 PROCEDURE — G0424 PULMONARY REHAB W EXER: HCPCS

## 2017-11-27 ENCOUNTER — APPOINTMENT (OUTPATIENT)
Dept: PULMONOLOGY | Facility: CLINIC | Age: 67
End: 2017-11-27
Payer: MEDICARE

## 2017-11-29 ENCOUNTER — APPOINTMENT (OUTPATIENT)
Dept: PULMONOLOGY | Facility: CLINIC | Age: 67
End: 2017-11-29
Payer: MEDICARE

## 2017-11-29 PROCEDURE — G0424 PULMONARY REHAB W EXER: HCPCS

## 2017-12-04 ENCOUNTER — APPOINTMENT (OUTPATIENT)
Dept: PULMONOLOGY | Facility: CLINIC | Age: 67
End: 2017-12-04
Payer: MEDICARE

## 2017-12-04 PROCEDURE — G0424 PULMONARY REHAB W EXER: HCPCS

## 2017-12-06 ENCOUNTER — APPOINTMENT (OUTPATIENT)
Dept: PULMONOLOGY | Facility: CLINIC | Age: 67
End: 2017-12-06
Payer: MEDICARE

## 2017-12-06 PROCEDURE — G0424 PULMONARY REHAB W EXER: HCPCS

## 2017-12-11 ENCOUNTER — APPOINTMENT (OUTPATIENT)
Dept: PULMONOLOGY | Facility: CLINIC | Age: 67
End: 2017-12-11
Payer: MEDICARE

## 2017-12-11 PROCEDURE — G0424 PULMONARY REHAB W EXER: HCPCS

## 2017-12-13 ENCOUNTER — APPOINTMENT (OUTPATIENT)
Dept: PULMONOLOGY | Facility: CLINIC | Age: 67
End: 2017-12-13
Payer: MEDICARE

## 2017-12-13 PROCEDURE — G0424 PULMONARY REHAB W EXER: HCPCS

## 2017-12-18 ENCOUNTER — APPOINTMENT (OUTPATIENT)
Dept: PULMONOLOGY | Facility: CLINIC | Age: 67
End: 2017-12-18
Payer: MEDICARE

## 2017-12-18 PROCEDURE — G0424 PULMONARY REHAB W EXER: HCPCS

## 2017-12-20 ENCOUNTER — APPOINTMENT (OUTPATIENT)
Dept: PULMONOLOGY | Facility: CLINIC | Age: 67
End: 2017-12-20
Payer: MEDICARE

## 2017-12-20 PROCEDURE — G0424 PULMONARY REHAB W EXER: HCPCS

## 2017-12-27 ENCOUNTER — APPOINTMENT (OUTPATIENT)
Dept: PULMONOLOGY | Facility: CLINIC | Age: 67
End: 2017-12-27
Payer: MEDICARE

## 2017-12-27 PROCEDURE — G0424 PULMONARY REHAB W EXER: HCPCS

## 2017-12-29 ENCOUNTER — APPOINTMENT (OUTPATIENT)
Dept: PULMONOLOGY | Facility: CLINIC | Age: 67
End: 2017-12-29
Payer: MEDICARE

## 2017-12-29 PROCEDURE — G0424 PULMONARY REHAB W EXER: HCPCS

## 2018-01-03 ENCOUNTER — APPOINTMENT (OUTPATIENT)
Dept: PULMONOLOGY | Facility: CLINIC | Age: 68
End: 2018-01-03
Payer: MEDICARE

## 2018-01-03 PROCEDURE — G0424 PULMONARY REHAB W EXER: HCPCS

## 2018-01-05 ENCOUNTER — APPOINTMENT (OUTPATIENT)
Dept: PULMONOLOGY | Facility: CLINIC | Age: 68
End: 2018-01-05
Payer: MEDICARE

## 2018-01-05 DIAGNOSIS — J44.9 CHRONIC OBSTRUCTIVE PULMONARY DISEASE, UNSPECIFIED COPD TYPE (HCC): ICD-10-CM

## 2018-01-05 PROCEDURE — G0424 PULMONARY REHAB W EXER: HCPCS

## 2018-01-08 ENCOUNTER — APPOINTMENT (OUTPATIENT)
Dept: PULMONOLOGY | Facility: CLINIC | Age: 68
End: 2018-01-08
Payer: MEDICARE

## 2018-01-08 ENCOUNTER — ALLSCRIPTS OFFICE VISIT (OUTPATIENT)
Dept: OTHER | Facility: OTHER | Age: 68
End: 2018-01-08

## 2018-01-08 PROCEDURE — G0424 PULMONARY REHAB W EXER: HCPCS

## 2018-01-10 ENCOUNTER — CLINICAL SUPPORT (OUTPATIENT)
Dept: PULMONOLOGY | Facility: CLINIC | Age: 68
End: 2018-01-10
Payer: MEDICARE

## 2018-01-10 PROCEDURE — G0424 PULMONARY REHAB W EXER: HCPCS

## 2018-01-10 NOTE — PROGRESS NOTES
Assessment    1  Medicare annual wellness visit, initial (V70 0) (Z00 00)   2  Visit for screening mammogram (V76 12) (Z12 31)   3  Type 2 diabetes mellitus (250 00) (E11 9)   4  Hyperlipemia (272 4) (E78 5)    Plan   Hyperlipemia, Type 2 diabetes mellitus    · (1) CBC/PLT/DIFF; Status:Active; Requested for:10Oct2017;    · (1) COMPREHENSIVE METABOLIC PANEL; Status:Active; Requested for:10Oct2017;    · (1) HEMOGLOBIN A1C; Status:Active; Requested for:10Oct2017;    · (1) LIPID PANEL, FASTING; Status:Active; Requested for:10Oct2017;    · (Q) MICROALBUMIN, RANDOM URINE (W/CREATININE); Status:Active; Requested  for:10Oct2017;    · (Q) TSH, 3RD GENERATION W/REFLEX TO FT4; Status:Active; Requested  for:10Oct2017;   Medicare annual wellness visit, initial    · Drink plenty of fluids ; Status:Complete;   Done: 68ZAI5524 12:25PM   · Eat a normal well-balanced diet ; Status:Complete;   Done: 49BQM4635 12:25PM   · Eat foods that are high in calcium ; Status:Complete;   Done: 36OVK4526 12:25PM   · There are ways to decrease your stress and improve your sense of well-being  We  encourage you to keep active and exercise regularly  Make time to take care of yourself  and participate in activities that you enjoy  Stay connected to friends and family that can  support and comfort you  If at any time you have thoughts of harming yourself or  someone else, contact us immediately ; Status:Active; Requested for:10Oct2017;    · We encourage all of our patients to exercise regularly    30 minutes of exercise or physical  activity five or more days a week is recommended for children and adults ;  Status:Complete;   Done: 26EGL4811 12:25PM   · We recommend that you create an advance directive ; Status:Complete;   Done:  05HLX5642 12:25PM   · Follow-up visit in 1 year Evaluation and Treatment  Follow-up  Status: Hold For -  Scheduling  Requested for: 80ZZW1675    * MAMMO SCREENING BILATERAL W CAD; Status:Hold For - Scheduling; Requested [facility]:[id number];   Perform:[facility]; Due:10Oct2018; Ordered; For:Visit for screening mammogram; Ordered By:Svetlana Klein; Discussion/Summary    Pt advised basic labs, and fup thereafter in 6-8 weeks, to review these and for medication refills  She will fup with pulmonary for COPD management  Pt not wlling for any preventative testing/ vaccines today, till her lung issue is resolved  She follows up with Gerardo Aleman, and will call them re adnexal cyst/ Fup on annual gyn  Impression: Initial Annual Wellness Visit, with preventive exam as well as age and risk appropriate counseling completed  Cardiovascular screening and counseling: lipid panel is due every 6 mo year(s)  Diabetes screening and counseling: blood glucose is due every 6 mo year(s)  Colorectal cancer screening and counseling: due for a colonoscopy (low risk)  Breast cancer screening and counseling: due for a screening mammogram    Cervical cancer screening and counseling: due for a cervical pap smear  Osteoporosis screening and counseling: the patient declines screening and due for bone density ultrasound  Abdominal aortic aneurysm screening and counseling: the patient declines screening  Glaucoma screening and counseling: ophthalmologist referral    HIV screening and counseling: the risks and benefits of screening were discussed and screening not indicated  Hepatitis C Screening: the patient was counseled on Hepatitis C screening  Immunizations: influenza vaccine is up to date this year, the risks and benefits of pneumococcal vaccination were discussed with the patient, the patient declines the Zostavax vaccine and the patient declines the Td vaccine  Possible side effects of new medications were reviewed with the patient/guardian today  The treatment plan was reviewed with the patient/guardian   The patient/guardian understands and agrees with the treatment plan      Chief Complaint  [place] AWV- np      History of Present Illness  HPI: Patient is here to establish care  She was recently discharged from the hospital, diagnosed with severe COPD  Apparently patient had no lung issues and never had a pulmonary function test but was diagnosed to have a post viral bronchiolitis  Patient was started on inhalers  She has a post hospital follow-up with pulmonary in 2 weeks  She is a known diabetic of many years, well controlled on low-dose metformin  According to patient, her sugars were fluctuant during her hospital stay, since she was on prednisone  She is due for labs  Patient states that she will be due for medication refills in 3 months  She is very apprehensive, and says that she only wants her lung issue to be resolved  She is currently not interested in her preventive tests  Welcome to [employer] and Wellness Visits: The patient is being seen for the welcome to medicare visit  Medicare Screening and Risk Factors   Hospitalizations: she has been previously hospitalizied and she has been hospitalized 2 times  Once per lifetime medicare screening tests: ECG has not been done and AAA screening US has not yet been done  Medicare Screening Tests Risk Questions   Abdominal aortic aneurysm risk assessment: none indicated  Osteoporosis risk assessment: none indicated  HIV risk assessment: none indicated  Drug and Alcohol Use: The patient is a former cigarette smoker  The patient reports never drinking alcohol  Alcohol concern:   The patient has no concerns about alcohol abuse  She has never used illicit drugs  Diet and Physical Activity: Current diet includes limited junk food, 1 servings of fruit per day, 1 servings of vegetables per day, 1 servings of meat per day, 1 servings of whole grains per day, 2 servings of dairy products per day, once in a whole cups of coffee per day, once in a whole cups of tea per day, 0 cans of regular soda per day and glasses of water- about a bottle a day   The patient does not exercise  Exercise: walking, stretching 15 minutes per day  Mood Disorder and Cognitive Impairment Screening:   Depression screening was done using   negative for symptoms  She denies feeling down, depressed, or hopeless over the past two weeks  She denies feeling little interest or pleasure in doing things over the past two weeks  Cognitive impairment screening: denies difficulty learning/retaining new information, denies difficulty handling complex tasks, denies difficulty with reasoning, denies difficulty with spatial ability and orientation, denies difficulty with language and denies difficulty with behavior  Functional Ability/Level of Safety: Hearing is normal bilaterally, normal in the right ear and normal in the left ear  The patient is currently able to do activities of daily living without limitations, able to do instrumental activities of daily living without limitations, able to participate in social activities without limitations and able to drive without limitations  Activities of daily living details: does not need help using the phone, no transportation help needed, does not need help shopping, no meal preparation help needed, does not need help doing housework, does not need help doing laundry, does not need help managing medications and does not need help managing money  Fall risk factors: The patient fell no falls times in the past 12 months  Home safety risk factors:  no grab bars in the bathroom, but no unfamiliar surroundings, no loose rugs, no poor household lighting, no uneven floors, no household clutter and handrails on the stairs  Advance Directives: Advance directives: no living will, no durable power of  for health care directives and no advance directives  Co-Managers and Medical Equipment/Suppliers: See Patient Care Team   Preventive Quality Program 65 and Older: Falls Risk: The patient fell no falls times in the past 12 months   Symptoms Include: no confusion, no lightheadedness, no vertigo, no dizziness, no syncope, no impaired balance, no visual problems, no leg weakness, no recurring falls and no recent fall  Associated symptoms:  No associated symptoms are reported  The patient currently has no urinary incontinence symptoms  Urinary Incontinence Symptoms includes: urinary frequency        Patient Care Team    Care Team Member Role Specialty Office Number   Ean WEBB  Specialist Pulmonary Medicine [phone]     Review of Systems    Constitutional: as noted in HPI  Head and Face: negative  Eyes: negative  ENT: negative  Cardiovascular: negative  Respiratory: as noted in HPI  Gastrointestinal: negative  Genitourinary: negative  Musculoskeletal: negative  Integumentary and Breasts: negative  Neurological: negative  Psychiatric: negative  Endocrine: as noted in HPI  Hematologic and Lymphatic: negative  Active Problems    1  Chronic cough (786 2) (R05)   2  COPD, severity to be determined (496) (J44 9)    Past Medical History    The active problems and past medical history were reviewed and updated today  Family History    The family history was reviewed and updated today  Social History    · Former smoker (Q07 18) (H16 064)   · Smoked about a pack a day for 50 years and quit end of July 2017  The social history was reviewed and updated today  Current Meds   1  Budesonide 0 5 MG/2ML Inhalation Suspension; USE 1 UNIT DOSE VIA NEBULIZER   TWO TIMES A DAY; Therapy: 13Sep2017 to (Evaluate:14Jan2018)  Requested for: 16Sep2017; Last   Rx:16Sep2017 Ordered   2  Calcium 500 MG Oral Tablet Chewable; CHEW 1 TABLET DAILY; Therapy: 93VKE4344 to Recorded   3  Ipratropium-Albuterol 0 5-2 5 (3) MG/3ML Inhalation Solution; USE ONE AMPULE IN   NEBULIZER THREE TIMES DAILY; Therapy: 32Nmc8897 to (Evaluate:14Jan2018)  Requested for: 16Sep2017; Last   Rx:16Sep2017 Ordered   4   MetFORMIN HCl  MG Oral Tablet Extended Release 24 Hour; TAKE 1 TABLET   DAILY AS DIRECTED; Therapy: 97OGY0757 to Recorded   5  Simvastatin 10 MG Oral Tablet; TAKE 1 TABLET DAILY; Therapy: 39ZTD8345 to (Evaluate:91Kxb0018) Recorded   6  Stiolto Respimat 2 5-2 5 MCG/ACT Inhalation Aerosol Solution; Therapy: 41YZU7147 to Recorded   7  Ventolin  (90 Base) MCG/ACT Inhalation Aerosol Solution; INHALE 2 PUFFS   EVERY 4-6 HOURS AS NEEDED; Therapy: 74UYS7796 to () Recorded    The medication list was reviewed and updated today  Allergies    1  predniSONE    Immunizations   1    Influenza  03-Oct-2017    PCV  10-Oct-2011    PPSV  10-Oct-2013     Vitals  Signs    Heart Rate: 55, L Radial  Respiration: 17  Systolic: 840  Diastolic: 80  Height: 4 ft 11 in  Weight: 143 lb 6 oz  BMI Calculated: 28 96  BSA Calculated: 1 6  O2 Saturation: 88    Physical Exam    Constitutional   General appearance: No acute distress, well appearing and well nourished  Ears, Nose, Mouth, and Throat   Otoscopic examination: Tympanic membranes translucent with normal light reflex  Canals patent without erythema  Oropharynx: Normal with no erythema, edema, exudate or lesions  Pulmonary   Respiratory effort: No increased work of breathing or signs of respiratory distress  Auscultation of lungs: Abnormal   rales/crackles over the right base  Cardiovascular   Auscultation of heart: Normal rate and rhythm, normal S1 and S2, no murmurs  Examination of extremities for edema and/or varicosities: Normal     Abdomen   Abdomen: Non-tender, no masses  Liver and spleen: No hepatomegaly or splenomegaly  Lymphatic   Palpation of lymph nodes in neck: No lymphadenopathy  Musculoskeletal   Range of motion: Normal     Muscle strength/tone: Normal     Neurologic   Cortical function: Normal mental status  Coordination: Normal finger to nose and heel to shin      Psychiatric   Orientation to person, place, and time: Normal     Mood and affect: Normal        Results/Data  PHQ-2 Adult Depression Screening 10Oct2017 09:23AM User, Ahs     Test Name Result Flag Reference   PHQ-2 Adult Depression Score 0     Over the last two weeks, how often have you been bothered by any of the following problems? Little interest or pleasure in doing things: Not at all - 0  Feeling down, depressed, or hopeless: Not at all - 0   PHQ-2 Adult Depression Screening Negative         Future Appointments    Date/Time Provider Specialty Site   10/24/2017 02:40 PM RONEY Wood   Pulmonary Medicine [facility]     Signatures   Electronically signed by : Enoc Carrasco MD; Oct 10 2017 12:30PM EST                       (Author)

## 2018-01-13 VITALS
BODY MASS INDEX: 28.91 KG/M2 | DIASTOLIC BLOOD PRESSURE: 80 MMHG | HEART RATE: 55 BPM | OXYGEN SATURATION: 88 % | WEIGHT: 143.38 LBS | SYSTOLIC BLOOD PRESSURE: 124 MMHG | HEIGHT: 59 IN | RESPIRATION RATE: 17 BRPM

## 2018-01-13 NOTE — PROGRESS NOTES
Assessment   1  COPD, severity to be determined (496) (J44 9)    Discussion/Summary   Discussion Summary:    Patient is 50-year-old female with past medical history significant for severe Chronic Obstructive Pulmonary Disease who presents for follow-up  Patient is doing extremely well compared she where she was several months ago  She reports her breathing is greatly improved  She is only using the oxygen intermittently  She is compliant with all of inhalers with only rare use of her albuterol rescue inhaler  She may continue her current inhaler regimen   is currently completing pulmonary rehab which she said has been greatly beneficial for her exercise tolerance  She will continue to work out on her own    may follow up in clinic in 6 months  Or sooner as necessary  Counseling Documentation With Imm: The patient, patient's family was counseled regarding instructions for management,-- prognosis,-- impressions  Goals and Barriers: The patient has the current Goals: To continue working out in a good quality life  Patient's Capacity to Self-Care: Patient is able to Self-Care  Medication SE Review and Pt Understands Tx: Possible side effects of new medications were reviewed with the patient/guardian today  The treatment plan was reviewed with the patient/guardian  The patient/guardian understands and agrees with the treatment plan    Self Referrals:    Self Referrals: No      Active Problems   1  Chronic cough (786 2) (R05)   2  COPD, severity to be determined (496) (J44 9)   3  Hyperlipemia (272 4) (E78 5)   4  Medicare annual wellness visit, initial (V70 0) (Z00 00)   5  Type 2 diabetes mellitus (250 00) (E11 9)   6  Visit for routine gyn exam (V72 31) (Z01 419)   7   Visit for screening mammogram (V76 12) (Z12 31)    Chief Complaint   Chief Complaint Free Text Note Form: f/u for COPD      History of Present Illness   HPI: Patient is 50-year-old female with history of severe Chronic Obstructive Pulmonary Disease who presents for follow-up  The patient reports she has done extremely well since our last visit  She has no new complaints  She reports she is no longer needing her supplemental oxygen  She has completed a 12 week course of pulmonary rehab  She is using her Symbicort twice daily and has not required any supplemental albuterol in the past month  She is able to do all the activities that she wishes  She only notices shortness of breath when trying keep others follow things      Review of Systems   Complete-Female - Pulm:      Constitutional: as noted in HPI,-- not feeling poorly-- and-- not feeling tired  Cardiovascular: as noted in HPI  Respiratory: no shortness of breath,-- no cough-- and-- no wheezing  ROS Reviewed:    ROS reviewed  Surgical History   Surgical History Reviewed: The surgical history was reviewed and updated today  Family History   Mother    1  Denied: Family history of depression  Father    2  Denied: Family history of depression   3  Family history of substance abuse (V17 0) (Z81 4)  Child    4  Denied: Family history of depression  Daughter    5  Denied: Family history of depression  Son    6  Denied: Family history of depression  Sibling    7  Denied: Family history of depression  Sister    6  Denied: Family history of depression  Family History    9  Denied: Family history of depression   10  Family history of substance abuse (V17 0) (Z81 4)  Family History Reviewed: The family history was reviewed and updated today  Social History    · Former smoker (Z87 27) (B81 083)   · Smoked about a pack a day for 50 years and quit end of July 2017  Social History Reviewed: The social history was reviewed and updated today  The social history was reviewed and is unchanged  Current Meds    1  Calcium 500 MG Oral Tablet Chewable; CHEW 1 TABLET DAILY; Therapy: 05AGY4982 to Recorded   2   MetFORMIN HCl  MG Oral Tablet Extended Release 24 Hour; TAKE 1 TABLET     DAILY AS DIRECTED; Therapy: 44XZU5411 to Recorded   3  Simvastatin 10 MG Oral Tablet; TAKE 1 TABLET DAILY; Therapy: 71PDW2608 to (Evaluate:54Frk9609) Recorded   4  Stiolto Respimat 2 5-2 5 MCG/ACT Inhalation Aerosol Solution; Therapy: 68ZVZ3546 to Recorded   5  Ventolin  (90 Base) MCG/ACT Inhalation Aerosol Solution; INHALE 2 PUFFS     EVERY 4-6 HOURS AS NEEDED; Therapy: 68JRL1310 to ([id number]) Recorded  Medication List Reviewed: The medication list was reviewed and updated today  Allergies   1  predniSONE    Vitals   Vital Signs    Recorded: 04IOH4584 01:45PM   Temperature 97 6 F   Heart Rate 91   Respiration 16   Systolic 487   Diastolic 80   Height 4 ft 11 in   Weight 143 lb    BMI Calculated 28 88   BSA Calculated 1 6   O2 Saturation 97, RA     Physical Exam        Constitutional      General appearance: No acute distress, well appearing and well nourished  -- pleasant  Ears, Nose, Mouth, and Throat      External inspection of ears and nose: Normal        Neck      Neck: Supple, symmetric, trachea midline, no masses  Jugular veins: Normal        Pulmonary      Chest: Normal        Respiratory effort: No increased work of breathing or signs of respiratory distress  Auscultation of lungs: Abnormal  -- Slightly diminished; no wheezing  Cardiovascular      Auscultation of heart: Normal rate and rhythm, normal S1 and S2, no murmurs  Examination of extremities for edema and/or varicosities: Normal        Abdomen      Abdomen: Soft, non-tender  Lymphatic      Palpation of lymph nodes in neck: No lymphadenopathy  Neurologic      Mental Status: Normal  Not confused, no evidence of dementia, good comprehension, good concentration  Skin      Skin and subcutaneous tissue: Limited exam shows no rash         Psychiatric      Orientation to person, place and time: Normal        Mood and affect: Normal        Signatures Electronically signed by : RONEY Guzman ; Jan 11 2018  3:38PM EST                       (Author)

## 2018-01-22 VITALS
WEIGHT: 142 LBS | SYSTOLIC BLOOD PRESSURE: 118 MMHG | TEMPERATURE: 98.7 F | DIASTOLIC BLOOD PRESSURE: 80 MMHG | HEIGHT: 59 IN | HEART RATE: 63 BPM | RESPIRATION RATE: 16 BRPM | BODY MASS INDEX: 28.63 KG/M2 | OXYGEN SATURATION: 90 %

## 2018-01-22 VITALS
DIASTOLIC BLOOD PRESSURE: 80 MMHG | SYSTOLIC BLOOD PRESSURE: 120 MMHG | BODY MASS INDEX: 31.25 KG/M2 | RESPIRATION RATE: 16 BRPM | OXYGEN SATURATION: 96 % | HEIGHT: 59 IN | TEMPERATURE: 99.9 F | WEIGHT: 155 LBS | HEART RATE: 99 BPM

## 2018-01-23 VITALS
BODY MASS INDEX: 28.83 KG/M2 | OXYGEN SATURATION: 97 % | HEIGHT: 59 IN | DIASTOLIC BLOOD PRESSURE: 80 MMHG | SYSTOLIC BLOOD PRESSURE: 120 MMHG | TEMPERATURE: 97.6 F | WEIGHT: 143 LBS | HEART RATE: 91 BPM | RESPIRATION RATE: 16 BRPM

## 2018-01-29 ENCOUNTER — TELEPHONE (OUTPATIENT)
Dept: FAMILY MEDICINE CLINIC | Facility: CLINIC | Age: 68
End: 2018-01-29

## 2018-01-29 RX ORDER — BUDESONIDE 0.5 MG/2ML
1 INHALANT ORAL 2 TIMES DAILY
COMMUNITY
Start: 2017-09-13 | End: 2018-04-11 | Stop reason: ALTCHOICE

## 2018-01-29 RX ORDER — ALBUTEROL SULFATE 90 UG/1
2 AEROSOL, METERED RESPIRATORY (INHALATION)
COMMUNITY
Start: 2017-09-20 | End: 2018-08-01 | Stop reason: SDUPTHER

## 2018-01-29 RX ORDER — IPRATROPIUM BROMIDE AND ALBUTEROL SULFATE 2.5; .5 MG/3ML; MG/3ML
SOLUTION RESPIRATORY (INHALATION)
COMMUNITY
Start: 2017-09-13 | End: 2018-04-11 | Stop reason: ALTCHOICE

## 2018-01-29 RX ORDER — LANOLIN ALCOHOL/MO/W.PET/CERES
1 CREAM (GRAM) TOPICAL DAILY
COMMUNITY
Start: 2017-09-20

## 2018-01-29 NOTE — TELEPHONE ENCOUNTER
Patient only wants metformin and symvastatin sent to pharmacy      Patient wants to be called when this has been done [phone]

## 2018-01-30 DIAGNOSIS — E78.5 HYPERLIPIDEMIA, UNSPECIFIED HYPERLIPIDEMIA TYPE: ICD-10-CM

## 2018-01-30 DIAGNOSIS — E13.9 OTHER SPECIFIED DIABETES MELLITUS WITHOUT COMPLICATION, WITHOUT LONG-TERM CURRENT USE OF INSULIN (HCC): Primary | ICD-10-CM

## 2018-01-30 RX ORDER — SIMVASTATIN 20 MG
20 TABLET ORAL
Qty: 90 TABLET | Refills: 0 | Status: SHIPPED | OUTPATIENT
Start: 2018-01-30 | End: 2018-01-31 | Stop reason: SDUPTHER

## 2018-01-30 RX ORDER — METFORMIN HYDROCHLORIDE 500 MG/1
500 TABLET, FILM COATED, EXTENDED RELEASE ORAL
Qty: 90 TABLET | Refills: 0 | Status: SHIPPED | OUTPATIENT
Start: 2018-01-30 | End: 2018-01-31 | Stop reason: SDUPTHER

## 2018-01-31 DIAGNOSIS — E78.5 HYPERLIPIDEMIA, UNSPECIFIED HYPERLIPIDEMIA TYPE: ICD-10-CM

## 2018-01-31 DIAGNOSIS — E13.9 OTHER SPECIFIED DIABETES MELLITUS WITHOUT COMPLICATION, WITHOUT LONG-TERM CURRENT USE OF INSULIN (HCC): ICD-10-CM

## 2018-01-31 RX ORDER — SIMVASTATIN 20 MG
20 TABLET ORAL
Qty: 90 TABLET | Refills: 0 | Status: SHIPPED | OUTPATIENT
Start: 2018-01-31 | End: 2018-04-11 | Stop reason: SDUPTHER

## 2018-01-31 RX ORDER — METFORMIN HYDROCHLORIDE 500 MG/1
500 TABLET, FILM COATED, EXTENDED RELEASE ORAL
Qty: 90 TABLET | Refills: 0 | Status: SHIPPED | OUTPATIENT
Start: 2018-01-31 | End: 2018-02-01

## 2018-02-01 DIAGNOSIS — E11.9 TYPE 2 DIABETES MELLITUS WITHOUT COMPLICATION, WITHOUT LONG-TERM CURRENT USE OF INSULIN (HCC): Primary | ICD-10-CM

## 2018-03-28 LAB
ALBUMIN SERPL-MCNC: 4.1 G/DL (ref 3.6–5.1)
ALBUMIN/CREAT UR: 7 MCG/MG CREAT
ALBUMIN/GLOB SERPL: 1.7 (CALC) (ref 1–2.5)
ALP SERPL-CCNC: 77 U/L (ref 33–130)
ALT SERPL-CCNC: 15 U/L (ref 6–29)
AST SERPL-CCNC: 16 U/L (ref 10–35)
BASOPHILS # BLD AUTO: 33 CELLS/UL (ref 0–200)
BASOPHILS NFR BLD AUTO: 0.5 %
BILIRUB SERPL-MCNC: 0.6 MG/DL (ref 0.2–1.2)
BUN SERPL-MCNC: 16 MG/DL (ref 7–25)
BUN/CREAT SERPL: NORMAL (CALC) (ref 6–22)
CALCIUM SERPL-MCNC: 9.5 MG/DL (ref 8.6–10.4)
CHLORIDE SERPL-SCNC: 102 MMOL/L (ref 98–110)
CHOLEST SERPL-MCNC: 157 MG/DL
CHOLEST/HDLC SERPL: 2.4 (CALC)
CO2 SERPL-SCNC: 28 MMOL/L (ref 20–31)
CREAT SERPL-MCNC: 0.65 MG/DL (ref 0.5–0.99)
CREAT UR-MCNC: 41 MG/DL (ref 20–320)
EOSINOPHIL # BLD AUTO: 143 CELLS/UL (ref 15–500)
EOSINOPHIL NFR BLD AUTO: 2.2 %
ERYTHROCYTE [DISTWIDTH] IN BLOOD BY AUTOMATED COUNT: 12.8 % (ref 11–15)
GLOBULIN SER CALC-MCNC: 2.4 G/DL (CALC) (ref 1.9–3.7)
GLUCOSE SERPL-MCNC: 97 MG/DL (ref 65–99)
HBA1C MFR BLD: 6.2 % OF TOTAL HGB
HCT VFR BLD AUTO: 39.6 % (ref 35–45)
HDLC SERPL-MCNC: 66 MG/DL
HGB BLD-MCNC: 13.5 G/DL (ref 11.7–15.5)
LDLC SERPL CALC-MCNC: 69 MG/DL (CALC)
LYMPHOCYTES # BLD AUTO: 3023 CELLS/UL (ref 850–3900)
LYMPHOCYTES NFR BLD AUTO: 46.5 %
MCH RBC QN AUTO: 31.3 PG (ref 27–33)
MCHC RBC AUTO-ENTMCNC: 34.1 G/DL (ref 32–36)
MCV RBC AUTO: 91.9 FL (ref 80–100)
MICROALBUMIN UR-MCNC: 0.3 MG/DL
MONOCYTES # BLD AUTO: 501 CELLS/UL (ref 200–950)
MONOCYTES NFR BLD AUTO: 7.7 %
NEUTROPHILS # BLD AUTO: 2802 CELLS/UL (ref 1500–7800)
NEUTROPHILS NFR BLD AUTO: 43.1 %
NONHDLC SERPL-MCNC: 91 MG/DL (CALC)
PLATELET # BLD AUTO: 284 THOUSAND/UL (ref 140–400)
PMV BLD REES-ECKER: 10 FL (ref 7.5–12.5)
POTASSIUM SERPL-SCNC: 4.2 MMOL/L (ref 3.5–5.3)
PROT SERPL-MCNC: 6.5 G/DL (ref 6.1–8.1)
RBC # BLD AUTO: 4.31 MILLION/UL (ref 3.8–5.1)
SL AMB EGFR AFRICAN AMERICAN: 106 ML/MIN/1.73M2
SL AMB EGFR NON AFRICAN AMERICAN: 91 ML/MIN/1.73M2
SODIUM SERPL-SCNC: 138 MMOL/L (ref 135–146)
TRIGL SERPL-MCNC: 134 MG/DL
TSH SERPL-ACNC: 1.97 MIU/L (ref 0.4–4.5)
WBC # BLD AUTO: 6.5 THOUSAND/UL (ref 3.8–10.8)

## 2018-04-11 ENCOUNTER — OFFICE VISIT (OUTPATIENT)
Dept: FAMILY MEDICINE CLINIC | Facility: CLINIC | Age: 68
End: 2018-04-11
Payer: MEDICARE

## 2018-04-11 VITALS
DIASTOLIC BLOOD PRESSURE: 72 MMHG | OXYGEN SATURATION: 92 % | WEIGHT: 145 LBS | HEART RATE: 72 BPM | SYSTOLIC BLOOD PRESSURE: 126 MMHG | BODY MASS INDEX: 29.23 KG/M2 | HEIGHT: 59 IN | RESPIRATION RATE: 18 BRPM

## 2018-04-11 DIAGNOSIS — E11.9 TYPE 2 DIABETES MELLITUS WITHOUT COMPLICATION, WITHOUT LONG-TERM CURRENT USE OF INSULIN (HCC): Primary | Chronic | ICD-10-CM

## 2018-04-11 DIAGNOSIS — J44.9 CHRONIC OBSTRUCTIVE PULMONARY DISEASE, UNSPECIFIED COPD TYPE (HCC): Chronic | ICD-10-CM

## 2018-04-11 DIAGNOSIS — E78.5 HYPERLIPIDEMIA, UNSPECIFIED HYPERLIPIDEMIA TYPE: Chronic | ICD-10-CM

## 2018-04-11 DIAGNOSIS — Z12.11 SCREEN FOR COLON CANCER: ICD-10-CM

## 2018-04-11 PROCEDURE — 99214 OFFICE O/P EST MOD 30 MIN: CPT | Performed by: FAMILY MEDICINE

## 2018-04-11 RX ORDER — BUDESONIDE AND FORMOTEROL FUMARATE DIHYDRATE 160; 4.5 UG/1; UG/1
2 AEROSOL RESPIRATORY (INHALATION) 2 TIMES DAILY
COMMUNITY
End: 2018-04-11 | Stop reason: SDUPTHER

## 2018-04-11 RX ORDER — SIMVASTATIN 20 MG
20 TABLET ORAL
Qty: 90 TABLET | Refills: 1 | Status: SHIPPED | OUTPATIENT
Start: 2018-04-11 | End: 2018-10-11 | Stop reason: SDUPTHER

## 2018-04-11 RX ORDER — BUDESONIDE AND FORMOTEROL FUMARATE DIHYDRATE 160; 4.5 UG/1; UG/1
2 AEROSOL RESPIRATORY (INHALATION) 2 TIMES DAILY
Qty: 3 INHALER | Refills: 1 | Status: SHIPPED | OUTPATIENT
Start: 2018-04-11 | End: 2018-10-11 | Stop reason: SDUPTHER

## 2018-04-11 NOTE — PROGRESS NOTES
Subjective:      Patient ID: Debra Martinez is a 75 y o  female  Patient is a 46 month follow-up  She has been fine healthy and was vein  Her A1c has improved from 7 to 6 2  Patient has a history of COPD  And a lung nodule for which she follows up with pulmonologist   Patient is requesting a refill of Symbicort today  patient is due for  screening colonoscopy  She has undergone 2 colonoscopies in the past and they were normal     She also follows up with her gyn Dr Jesusita Suresh  Her last Pap smear was 1 year ago  Patient went for a mammogram last week at [facility]   Patient states that she has received a letter in the mail stating that her mammogram was normal   I do not see a copy of a hole in her chart  Patient advised to bring a copy of the letter  Diabetes   She presents for her follow-up diabetic visit  She has type 2 diabetes mellitus  No MedicAlert identification noted  Her disease course has been improving  There are no diabetic associated symptoms  Pertinent negatives for diabetes include no chest pain, no fatigue, no polydipsia, no polyphagia, no polyuria, no visual change and no weight loss  Symptoms are stable (A1c 6 2)  There are no diabetic complications  Risk factors for coronary artery disease include dyslipidemia, diabetes mellitus and post-menopausal  Current diabetic treatments: Metformin 500 milligram daily  She is compliant with treatment all of the time  She is following a diabetic diet  Meal planning includes ADA exchanges  She has not had a previous visit with a dietitian  She participates in exercise daily  An ACE inhibitor/angiotensin II receptor blocker is not being taken  She does not see a podiatrist Eye exam is current  Hyperlipidemia   This is a chronic problem  The current episode started more than 1 year ago  The problem is controlled   Recent lipid tests were reviewed and are normal  Pertinent negatives include no chest pain, focal sensory loss, leg pain, myalgias or shortness of breath  Current antihyperlipidemic treatment includes statins  The current treatment provides significant improvement of lipids  There are no compliance problems  Risk factors for coronary artery disease include dyslipidemia, diabetes mellitus and post-menopausal        Past Medical History:   Diagnosis Date    [facility]     [facility] [address]     Hyperlipidemia     Pulmonary nodule 8/31/2017       Family History   Problem Relation Age of Onset    COPD Sister     Diabetes Sister     Substance Abuse Father     Substance Abuse Family        Past Surgical History:   Procedure Laterality Date    HAND SURGERY      TENDON TRANSFER      Left lower arm        reports that she quit smoking about 15 months ago  Her smoking use included Cigarettes  She started smoking about 48 years ago  She has a 47 00 pack-year smoking history  She has never used smokeless tobacco  She reports that she does not drink alcohol or use drugs        Current Outpatient Prescriptions:     albuterol (PROVENTIL HFA,VENTOLIN HFA) 90 mcg/act inhaler, Inhale 2 puffs every 6 (six) hours as needed for wheezing, Disp: , Rfl:     albuterol (VENTOLIN HFA) 90 mcg/act inhaler, Inhale 2 puffs, Disp: , Rfl:     budesonide-formoterol (SYMBICORT) 160-4 5 mcg/act inhaler, Inhale 2 puffs 2 (two) times a day, Disp: 3 Inhaler, Rfl: 1    Calcium 500-100 MG-UNIT CHEW, Chew 1 tablet daily, Disp: , Rfl:     ipratropium (ATROVENT) 0 02 % nebulizer solution, Take 2 5 mL by nebulization 3 (three) times a day, Disp: 75 mL, Rfl: 0    metFORMIN (GLUCOPHAGE) 500 mg tablet, Take 1 tablet (500 mg total) by mouth daily with breakfast, Disp: 90 tablet, Rfl: 1    simvastatin (ZOCOR) 20 mg tablet, Take 1 tablet (20 mg total) by mouth daily at bedtime, Disp: 90 tablet, Rfl: 1    Tiotropium Bromide-Olodaterol (STIOLTO RESPIMAT) 2 5-2 5 MCG/ACT AERS, Inhale 2 puffs daily, Disp: 1 Inhaler, Rfl: 3   ALPRAZolam (XANAX) 0 25 mg tablet, Take 1 tablet by mouth 3 (three) times a day as needed for anxiety for up to 10 days, Disp: 30 tablet, Rfl: 0    The following portions of the patient's history were reviewed and updated as appropriate: allergies, current medications, past family history, past medical history, past social history, past surgical history and problem list     Review of Systems   Constitutional: Negative  Negative for fatigue and weight loss  Respiratory: Negative  Negative for shortness of breath  Cardiovascular: Negative  Negative for chest pain and palpitations  Endocrine: Negative for polydipsia, polyphagia and polyuria  Musculoskeletal: Negative for myalgias  Neurological: Negative  Psychiatric/Behavioral: Negative  Objective:    /72   Pulse 72   Resp 18   Ht 4' 11" (1 499 m)   Wt 65 8 kg (145 lb)   SpO2 92%   BMI 29 29 kg/m²      Physical Exam   Constitutional: She is oriented to person, place, and time  She appears well-developed and well-nourished  Cardiovascular: Normal rate, regular rhythm and normal heart sounds  Pulmonary/Chest: Effort normal and breath sounds normal    Abdominal: Soft  Bowel sounds are normal    Neurological: She is alert and oriented to person, place, and time     Psychiatric: Her behavior is normal  Judgment normal           daibeticfoot    Recent Results (from the past 1008 hour(s))   Lipid panel    Collection Time: 03/26/18  2:21 PM   Result Value Ref Range    Total Cholesterol 157 <200 mg/dL    SL AMB HDL CHOLESTEROL 66 >50 mg/dL    Triglycerides 134 <150 mg/dL    SL AMB LDL-CHOLESTEROL 69 mg/dL (calc)    SL AMB CHOL/HDLC RATIO 2 4 <5 0 (calc)    SL AMB NON HDL CHOLESTEROL 91 <130 mg/dL (calc)   Comprehensive metabolic panel    Collection Time: 03/26/18  2:21 PM   Result Value Ref Range    SL AMB GLUCOSE 97 65 - 99 mg/dL    BUN 16 7 - 25 mg/dL    Creatinine, Serum 0 65 0 50 - 0 99 mg/dL    eGFR Non  91 > OR = 60 mL/min/1 73m2    SL AMB EGFR  106 > OR = 60 mL/min/1 73m2    SL AMB BUN/CREATININE RATIO NOT APPLICABLE 6 - 22 (calc)    SL AMB SODIUM 138 135 - 146 mmol/L    SL AMB POTASSIUM 4 2 3 5 - 5 3 mmol/L    SL AMB CHLORIDE 102 98 - 110 mmol/L    SL AMB CARBON DIOXIDE 28 20 - 31 mmol/L    SL AMB CALCIUM 9 5 8 6 - 10 4 mg/dL    SL AMB PROTEIN, TOTAL 6 5 6 1 - 8 1 g/dL    Serum Albumin 4 1 3 6 - 5 1 g/dL    SL AMB GLOBULIN 2 4 1 9 - 3 7 g/dL (calc)    SL AMB ALBUMIN/GLOBULIN RATIO 1 7 1 0 - 2 5 (calc)    SL AMB BILIRUBIN, TOTAL 0 6 0 2 - 1 2 mg/dL    SL AMB ALKALINE PHOSPHATASE 77 33 - 130 U/L    SL AMB AST 16 10 - 35 U/L    SL AMB ALT 15 6 - 29 U/L   Microalbumin, Random Urine (W/Creatinine)    Collection Time: 03/26/18  2:21 PM   Result Value Ref Range    Creatinine, Urine 41 20 - 320 mg/dL    Microalbum  ,U,Random 0 3 See Note: mg/dL    Microalb/Creat Ratio 7 <30 mcg/mg creat   CBC and differential    Collection Time: 03/26/18  2:21 PM   Result Value Ref Range    SL AMB LAB WHITE BLOOD CELL COUNT 6 5 3 8 - 10 8 Thousand/uL    SL AMB LAB RED BLOOD CELLS 4 31 3 80 - 5 10 Million/uL    Hemoglobin 13 5 11 7 - 15 5 g/dL    Hematocrit 39 6 35 0 - 45 0 %    MCV 91 9 80 0 - 100 0 fL    MCH 31 3 27 0 - 33 0 pg    MCHC 34 1 32 0 - 36 0 g/dL    RDW 12 8 11 0 - 15 0 %    Platelet Count 027 647 - 400 Thousand/uL    SL AMB MPV 10 0 7 5 - 12 5 fL    Neutrophils (Absolute) 2,802 1,500 - 7,800 cells/uL    Lymphocytes (Absolute) 3,023 850 - 3,900 cells/uL    Monocytes (Absolute) 501 200 - 950 cells/uL    Eosinophils (Absolute) 143 15 - 500 cells/uL    Basophils (Absolute) 33 0 - 200 cells/uL    Neutrophils 43 1 %    Lymphocytes 46 5 %    Monocytes 7 7 %    Eosinophils 2 2 %    Basophils 0 5 %   TSH, 3rd generation with T4 reflex    Collection Time: 03/26/18  2:21 PM   Result Value Ref Range    SL AMB TSH W/ REFLEX TO FREE T4 1 97 0 40 - 4 50 mIU/L   Hemoglobin A1c    Collection Time: 03/26/18  2:21 PM   Result Value Ref Range    Hemoglobin A1C 6 2 (H) <5 7 % of total Hgb       Assessment/Plan:       Patient's glycemic control has improved on low-dose of metformin  She will continue same  Her LDL is also at goal on current dose of simvastatin, she will continue same  Encouraged to go for labs after 6 months and follow up thereafter  Patient also encouraged to go for her screening colonoscopy  She has not decided yet but states that she will think about it  Patient was provided a refill of Symbicort, and encouraged her to follow up with her pulmonologist regarding the lung nodules  Diagnoses and all orders for this visit:    Type 2 diabetes mellitus without complication, without long-term current use of insulin (HCC)  -     metFORMIN (GLUCOPHAGE) 500 mg tablet; Take 1 tablet (500 mg total) by mouth daily with breakfast  -     CBC and differential; Future  -     Comprehensive metabolic panel; Future  -     HEMOGLOBIN A1C W/ EAG ESTIMATION; Future    Chronic obstructive pulmonary disease, unspecified COPD type (HCC)  -     budesonide-formoterol (SYMBICORT) 160-4 5 mcg/act inhaler; Inhale 2 puffs 2 (two) times a day  -     CBC and differential; Future  -     Comprehensive metabolic panel; Future    Hyperlipidemia, unspecified hyperlipidemia type  -     simvastatin (ZOCOR) 20 mg tablet; Take 1 tablet (20 mg total) by mouth daily at bedtime  -     CBC and differential; Future  -     Comprehensive metabolic panel; Future  -     Lipid panel; Future    Screen for colon cancer  -     Ambulatory referral to Gastroenterology; Future    Other orders  -     Discontinue: budesonide-formoterol (SYMBICORT) 160-4 5 mcg/act inhaler;  Inhale 2 puffs 2 (two) times a day

## 2018-04-11 NOTE — PROGRESS NOTES
Patient's shoes and socks removed  Right Foot/Ankle   Right Foot Inspection  Skin Exam: skin normal and skin intact no dry skin, no warmth, no callus, no erythema, no maceration, no abnormal color, no pre-ulcer, no ulcer and no callus                          Toe Exam: ROM and strength within normal limits  Sensory   Vibration: intact  Proprioception: intact   Monofilament testing: intact  Vascular  Capillary refills: < 3 seconds  The right DP pulse is 2+  The right PT pulse is 2+  Left Foot/Ankle  Left Foot Inspection  Skin Exam: skin normal and skin intactno dry skin, no warmth, no erythema, no maceration, normal color, no pre-ulcer, no ulcer and no callus                         Toe Exam: ROM and strength within normal limits                   Sensory   Vibration: intact  Proprioception: intact  Monofilament: intact  Vascular  Capillary refills: < 3 seconds  The left DP pulse is 2+  The left PT pulse is 2+  Assign Risk Category:  No deformity present; No loss of protective sensation;  No weak pulses       Risk: 0

## 2018-08-01 ENCOUNTER — OFFICE VISIT (OUTPATIENT)
Dept: PULMONOLOGY | Facility: CLINIC | Age: 68
End: 2018-08-01
Payer: MEDICARE

## 2018-08-01 VITALS
TEMPERATURE: 98.3 F | HEIGHT: 59 IN | RESPIRATION RATE: 16 BRPM | HEART RATE: 112 BPM | DIASTOLIC BLOOD PRESSURE: 68 MMHG | SYSTOLIC BLOOD PRESSURE: 108 MMHG | BODY MASS INDEX: 29.43 KG/M2 | OXYGEN SATURATION: 93 % | WEIGHT: 146 LBS

## 2018-08-01 DIAGNOSIS — J44.9 CHRONIC OBSTRUCTIVE PULMONARY DISEASE, UNSPECIFIED COPD TYPE (HCC): Primary | Chronic | ICD-10-CM

## 2018-08-01 DIAGNOSIS — Z72.0 TOBACCO ABUSE: ICD-10-CM

## 2018-08-01 PROCEDURE — 99214 OFFICE O/P EST MOD 30 MIN: CPT | Performed by: INTERNAL MEDICINE

## 2018-08-01 RX ORDER — ALBUTEROL SULFATE 90 UG/1
2 AEROSOL, METERED RESPIRATORY (INHALATION) EVERY 6 HOURS PRN
Qty: 1 INHALER | Refills: 3 | Status: SHIPPED | OUTPATIENT
Start: 2018-08-01 | End: 2019-11-20 | Stop reason: SDUPTHER

## 2018-08-01 NOTE — PROGRESS NOTES
Progress Note - Pulmonary   Ramsey Matos 75 y o  female MRN: [id number]   Encounter: [id number]      Assessment/Plan:  Patient is 71-year-old female with past medical history significant for severe Chronic Obstructive Pulmonary Disease who presents for routine follow-up  In general the patient is doing quite well  She is taking both Stiolto and Symbicort  She reports significant cough when taking the Stiolto  We will discontinue Stiolto at this time and transition to [place]  For the patient's increased mucus production, she may do a trial of guaifenesin and increase her p r n  use of albuterol  If the patient notices significant benefit from the [place], she may call for refill  The patient should have her routine low-dose screening CT completed after September 7, 2018  The patient may follow up in 3 months or sooner as necessary  Subjective:   Patient reports generally doing quite well  She is primarily concerned about the cough  She notes increased amount of congestion but denies any associated fevers, chills, nausea or vomiting  Pt reports significant cough with taking the Stiolto  She is using the supplemental oxygen as needed  She reports she tests her oxygen at rest and its normally > 92%  Inhaler Regimen:  Symbicort - daily  Stiolto - daily  Albuterol MDI - daily  Ipratropium/albuterol nebulizer - no recent usage    Remainder of 12 point review of systems negative except as described in HPI  The following portions of the patient's history were reviewed and updated as appropriate: allergies, current medications, past family history, past medical history, past social history, past surgical history and problem list      Objective:   Vitals: Blood pressure 108/68, pulse (!) 112, temperature 98 3 °F (36 8 °C), temperature source Tympanic, resp  rate 16, height 4' 11" (1 499 m), weight 66 2 kg (146 lb), SpO2 93 % , RA, Body mass index is 29 49 kg/m²      Physical Exam  Gen: Awake, alert, oriented x 3, no acute distress  HEENT: Mucous membranes moist, no oral lesions, no thrush  NECK: No accessory muscle use, JVP not elevated  Cardiac: Regular, single S1, single S2, no murmurs, no rubs, no gallops  Lungs: slight wheeze; good air movement  Abdomen: normoactive bowel sounds, soft nontender, nondistended, no rebound or rigidity, no guarding  Extremities: no cyanosis, no clubbing, no edema  MSK:  Strength equal in all extremities  Derm:  No rashes/lesions noted  Neuro:  Appropriate mood/affect    Labs: I have personally reviewed pertinent lab results  Lab Results   Component Value Date    GLUCOSE 134 09/11/2017    CALCIUM 9 5 03/26/2018     09/11/2017    K 3 9 09/11/2017    CO2 29 09/11/2017     09/11/2017    BUN 16 03/26/2018    CREATININE 0 65 03/26/2018     Imaging and other studies: I have personally reviewed pertinent reports  and I have personally reviewed pertinent films in PACS  9/9/2017:  Pulmonary emphysema  Scattered areas of chronic interstitial changes stable since the prior study  No pulmonary consolidation or infiltrate  No endotracheal or endobronchial lesion        Stable 5 to 6 mm right lower lobe nodule, now seen on series 3 image 39  Based on current Fleischner Society 2017 Guidelines on incidental pulmonary nodule, followup non-contrast CT is recommended, initially at 6-12 months from this examination and, if stable at that time, an additional followup is recommended for 18-24 months from this exam        Pulmonary Function Testing: I have personally reviewed pertinent reports     and I have personally reviewed pertinent films in PACS  9/27/2017:  FEV1/FVC Ratio: 54 %  Forced Vital Capacity: 1 42 L    57 % predicted  FEV1: 0 77 L     41 % predicted  After administration of bronchodilator FEV1: reduced by 3%  Lung volumes by body plethysmography: Total Lung Capacity 103 % predicted Residual volume 171 % predicted  DLCO corrected for patients hemoglobin level: not able to be performed    Ramone Navarrete

## 2018-08-02 ENCOUNTER — DOCUMENTATION (OUTPATIENT)
Dept: PULMONOLOGY | Facility: CLINIC | Age: 68
End: 2018-08-02

## 2018-09-18 ENCOUNTER — HOSPITAL ENCOUNTER (OUTPATIENT)
Dept: CT IMAGING | Facility: HOSPITAL | Age: 68
Discharge: HOME | End: 2018-09-18
Attending: INTERNAL MEDICINE
Payer: COMMERCIAL

## 2018-09-18 DIAGNOSIS — Z72.0 TOBACCO ABUSE: ICD-10-CM

## 2018-09-27 LAB
ALBUMIN SERPL-MCNC: 4.2 G/DL (ref 3.6–5.1)
ALBUMIN/GLOB SERPL: 1.6 (CALC) (ref 1–2.5)
ALP SERPL-CCNC: 63 U/L (ref 33–130)
ALT SERPL-CCNC: 13 U/L (ref 6–29)
AST SERPL-CCNC: 13 U/L (ref 10–35)
BASOPHILS # BLD AUTO: 38 CELLS/UL (ref 0–200)
BASOPHILS NFR BLD AUTO: 0.6 %
BILIRUB SERPL-MCNC: 0.6 MG/DL (ref 0.2–1.2)
BUN SERPL-MCNC: 16 MG/DL (ref 7–25)
BUN/CREAT SERPL: ABNORMAL (CALC) (ref 6–22)
CALCIUM SERPL-MCNC: 9.4 MG/DL (ref 8.6–10.4)
CHLORIDE SERPL-SCNC: 102 MMOL/L (ref 98–110)
CHOLEST SERPL-MCNC: 167 MG/DL
CHOLEST/HDLC SERPL: 2.6 (CALC)
CO2 SERPL-SCNC: 30 MMOL/L (ref 20–32)
CREAT SERPL-MCNC: 0.8 MG/DL (ref 0.5–0.99)
EOSINOPHIL # BLD AUTO: 282 CELLS/UL (ref 15–500)
EOSINOPHIL NFR BLD AUTO: 4.4 %
ERYTHROCYTE [DISTWIDTH] IN BLOOD BY AUTOMATED COUNT: 12.6 % (ref 11–15)
EST. AVERAGE GLUCOSE BLD GHB EST-MCNC: 137 (CALC)
EST. AVERAGE GLUCOSE BLD GHB EST-SCNC: 7.6 (CALC)
GLOBULIN SER CALC-MCNC: 2.7 G/DL (CALC) (ref 1.9–3.7)
GLUCOSE SERPL-MCNC: 107 MG/DL (ref 65–99)
HBA1C MFR BLD: 6.4 % OF TOTAL HGB
HCT VFR BLD AUTO: 40.4 % (ref 35–45)
HDLC SERPL-MCNC: 64 MG/DL
HGB BLD-MCNC: 13.5 G/DL (ref 11.7–15.5)
LDLC SERPL CALC-MCNC: 76 MG/DL (CALC)
LYMPHOCYTES # BLD AUTO: 2854 CELLS/UL (ref 850–3900)
LYMPHOCYTES NFR BLD AUTO: 44.6 %
MCH RBC QN AUTO: 30.1 PG (ref 27–33)
MCHC RBC AUTO-ENTMCNC: 33.4 G/DL (ref 32–36)
MCV RBC AUTO: 90 FL (ref 80–100)
MONOCYTES # BLD AUTO: 730 CELLS/UL (ref 200–950)
MONOCYTES NFR BLD AUTO: 11.4 %
NEUTROPHILS # BLD AUTO: 2496 CELLS/UL (ref 1500–7800)
NEUTROPHILS NFR BLD AUTO: 39 %
NONHDLC SERPL-MCNC: 103 MG/DL (CALC)
PLATELET # BLD AUTO: 332 THOUSAND/UL (ref 140–400)
PMV BLD REES-ECKER: 9 FL (ref 7.5–12.5)
POTASSIUM SERPL-SCNC: 4.2 MMOL/L (ref 3.5–5.3)
PROT SERPL-MCNC: 6.9 G/DL (ref 6.1–8.1)
RBC # BLD AUTO: 4.49 MILLION/UL (ref 3.8–5.1)
SL AMB EGFR AFRICAN AMERICAN: 88 ML/MIN/1.73M2
SL AMB EGFR NON AFRICAN AMERICAN: 76 ML/MIN/1.73M2
SODIUM SERPL-SCNC: 139 MMOL/L (ref 135–146)
TRIGL SERPL-MCNC: 169 MG/DL
WBC # BLD AUTO: 6.4 THOUSAND/UL (ref 3.8–10.8)

## 2018-10-11 ENCOUNTER — OFFICE VISIT (OUTPATIENT)
Dept: FAMILY MEDICINE CLINIC | Facility: CLINIC | Age: 68
End: 2018-10-11
Payer: MEDICARE

## 2018-10-11 VITALS
HEART RATE: 99 BPM | BODY MASS INDEX: 29.03 KG/M2 | OXYGEN SATURATION: 99 % | HEIGHT: 59 IN | DIASTOLIC BLOOD PRESSURE: 72 MMHG | WEIGHT: 144 LBS | SYSTOLIC BLOOD PRESSURE: 102 MMHG

## 2018-10-11 DIAGNOSIS — E11.9 TYPE 2 DIABETES MELLITUS WITHOUT COMPLICATION, WITHOUT LONG-TERM CURRENT USE OF INSULIN (HCC): Chronic | ICD-10-CM

## 2018-10-11 DIAGNOSIS — E78.5 HYPERLIPIDEMIA, UNSPECIFIED HYPERLIPIDEMIA TYPE: Chronic | ICD-10-CM

## 2018-10-11 DIAGNOSIS — J44.9 CHRONIC OBSTRUCTIVE PULMONARY DISEASE, UNSPECIFIED COPD TYPE (HCC): Chronic | ICD-10-CM

## 2018-10-11 PROCEDURE — 99213 OFFICE O/P EST LOW 20 MIN: CPT | Performed by: FAMILY MEDICINE

## 2018-10-11 PROCEDURE — G0439 PPPS, SUBSEQ VISIT: HCPCS | Performed by: FAMILY MEDICINE

## 2018-10-11 RX ORDER — BUDESONIDE AND FORMOTEROL FUMARATE DIHYDRATE 160; 4.5 UG/1; UG/1
2 AEROSOL RESPIRATORY (INHALATION) 2 TIMES DAILY
Qty: 3 INHALER | Refills: 1 | Status: SHIPPED | OUTPATIENT
Start: 2018-10-11 | End: 2018-10-23 | Stop reason: SDUPTHER

## 2018-10-11 RX ORDER — SIMVASTATIN 20 MG
20 TABLET ORAL
Qty: 90 TABLET | Refills: 1 | Status: SHIPPED | OUTPATIENT
Start: 2018-10-11 | End: 2018-10-23 | Stop reason: SDUPTHER

## 2018-10-11 NOTE — PROGRESS NOTES
Subjective:      Patient ID: Ashish Sultana is a 75 y o  female  Diabetes   She presents for her follow-up diabetic visit  She has type 2 diabetes mellitus  Her disease course has been stable (A1c 6 4)  There are no hypoglycemic associated symptoms  There are no diabetic associated symptoms  Pertinent negatives for diabetes include no chest pain  There are no hypoglycemic complications  Symptoms are stable  There are no diabetic complications  Risk factors for coronary artery disease include diabetes mellitus, dyslipidemia, obesity and sedentary lifestyle  Current diabetic treatments: Metformin 500 mg daily  She is compliant with treatment all of the time  She is following a generally healthy diet  Meal planning includes ADA exchanges  She participates in exercise daily  An ACE inhibitor/angiotensin II receptor blocker is not being taken  She does not see a podiatrist Eye exam is not current  Hyperlipidemia   This is a chronic problem  The current episode started more than 1 year ago  The problem is controlled  Recent lipid tests were reviewed and are normal  Exacerbating diseases include diabetes  Pertinent negatives include no chest pain, myalgias or shortness of breath  Current antihyperlipidemic treatment includes statins  The current treatment provides significant improvement of lipids  There are no compliance problems  Risk factors for coronary artery disease include diabetes mellitus, dyslipidemia, obesity and post-menopausal    Cough   This is a chronic problem  The current episode started more than 1 year ago  The problem has been unchanged  The cough is non-productive  Pertinent negatives include no chest pain, chills, fever, myalgias, nasal congestion, postnasal drip, rhinorrhea, shortness of breath or wheezing  The symptoms are aggravated by pollens  Treatments tried: Symbicort, Stiolto, albuterol  The treatment provided significant relief  Her past medical history is significant for COPD   Patient follows up with pulmonology  Past Medical History:   Diagnosis Date    [facility] [address]     [facility] mellitus ([facility] [address] [facility]     Hyperlipidemia     Pulmonary nodule 8/31/2017       Family History   Problem Relation Age of Onset    COPD Sister     Diabetes Sister     Substance Abuse Father     Substance Abuse Family        Past Surgical History:   Procedure Laterality Date    HAND SURGERY      TENDON TRANSFER      Left lower arm        reports that she quit smoking about 21 months ago  Her smoking use included Cigarettes  She started smoking about 48 years ago  She has a 47 00 pack-year smoking history  She has never used smokeless tobacco  She reports that she does not drink alcohol or use drugs  Current Outpatient Prescriptions:     albuterol (PROVENTIL HFA,VENTOLIN HFA) 90 mcg/act inhaler, Inhale 2 puffs every 6 (six) hours as needed for wheezing, Disp: 1 Inhaler, Rfl: 3    budesonide-formoterol (SYMBICORT) 160-4 5 mcg/act inhaler, Inhale 2 puffs 2 (two) times a day, Disp: 3 Inhaler, Rfl: 1    Calcium 500-100 MG-UNIT CHEW, Chew 1 tablet daily, Disp: , Rfl:     metFORMIN (GLUCOPHAGE) 500 mg tablet, Take 1 tablet (500 mg total) by mouth daily with breakfast, Disp: 90 tablet, Rfl: 1    simvastatin (ZOCOR) 20 mg tablet, Take 1 tablet (20 mg total) by mouth daily at bedtime, Disp: 90 tablet, Rfl: 1    Tiotropium Bromide-Olodaterol (STIOLTO RESPIMAT) 2 5-2 5 MCG/ACT AERS, Inhale 2 puffs daily, Disp: 1 Inhaler, Rfl: 3    ipratropium (ATROVENT) 0 02 % nebulizer solution, Take 2 5 mL by nebulization 3 (three) times a day (Patient not taking: Reported on 10/11/2018 ), Disp: 75 mL, Rfl: 0    The following portions of the patient's history were reviewed and updated as appropriate: allergies, current medications, past family history, past medical history, past social history, past surgical history and problem list     Review of Systems   Constitutional: Negative    Negative for chills and fever  HENT: Negative for postnasal drip and rhinorrhea  Respiratory: Positive for cough  Negative for shortness of breath and wheezing  Cardiovascular: Negative  Negative for chest pain and palpitations  Musculoskeletal: Negative for myalgias  Neurological: Negative  Psychiatric/Behavioral: Negative  Objective:    /72   Pulse 99   Ht 4' 11" (1 499 m)   Wt 65 3 kg (144 lb)   SpO2 99%   BMI 29 08 kg/m²      Physical Exam   Constitutional: She is oriented to person, place, and time  She appears well-developed and well-nourished  Cardiovascular: Normal rate, regular rhythm and normal heart sounds  Pulmonary/Chest: Effort normal and breath sounds normal    Abdominal: Soft  Bowel sounds are normal    Neurological: She is alert and oriented to person, place, and time     Psychiatric: Her behavior is normal  Judgment normal          Recent Results (from the past 1008 hour(s))   Lipid panel    Collection Time: 09/26/18 11:39 AM   Result Value Ref Range    Total Cholesterol 167 <200 mg/dL    HDL 64 >50 mg/dL    Triglycerides 169 (H) <150 mg/dL    LDL Direct 76 mg/dL (calc)    Chol HDLC Ratio 2 6 <5 0 (calc)    Non-HDL Cholesterol 103 <130 mg/dL (calc)   Comprehensive metabolic panel    Collection Time: 09/26/18 11:39 AM   Result Value Ref Range    Glucose 107 (H) 65 - 99 mg/dL    BUN 16 7 - 25 mg/dL    Creatinine 0 80 0 50 - 0 99 mg/dL    eGFR Non  76 > OR = 60 mL/min/1 73m2    SL AMB EGFR  88 > OR = 60 mL/min/1 73m2    SL AMB BUN/CREATININE RATIO NOT APPLICABLE 6 - 22 (calc)    Sodium 139 135 - 146 mmol/L    SL AMB POTASSIUM 4 2 3 5 - 5 3 mmol/L    Chloride 102 98 - 110 mmol/L    CO2 30 20 - 32 mmol/L    SL AMB CALCIUM 9 4 8 6 - 10 4 mg/dL    SL AMB PROTEIN, TOTAL 6 9 6 1 - 8 1 g/dL    Albumin 4 2 3 6 - 5 1 g/dL    Globulin 2 7 1 9 - 3 7 g/dL (calc)    SL AMB ALBUMIN/GLOBULIN RATIO 1 6 1 0 - 2 5 (calc)    TOTAL BILIRUBIN 0 6 0 2 - 1 2 mg/dL    Alkaline Phosphatase 63 33 - 130 U/L    SL AMB AST 13 10 - 35 U/L    SL AMB ALT 13 6 - 29 U/L   CBC and differential    Collection Time: 09/26/18 11:39 AM   Result Value Ref Range    White Blood Cell Count 6 4 3 8 - 10 8 Thousand/uL    Red Blood Cell Count 4 49 3 80 - 5 10 Million/uL    Hemoglobin 13 5 11 7 - 15 5 g/dL    HCT 40 4 35 0 - 45 0 %    MCV 90 0 80 0 - 100 0 fL    MCH 30 1 27 0 - 33 0 pg    MCHC 33 4 32 0 - 36 0 g/dL    RDW 12 6 11 0 - 15 0 %    Platelet Count 841 318 - 400 Thousand/uL    SL AMB MPV 9 0 7 5 - 12 5 fL    Neutrophils (Absolute) 2,496 1,500 - 7,800 cells/uL    Lymphocytes (Absolute) 2,854 850 - 3,900 cells/uL    Monocytes (Absolute) 730 200 - 950 cells/uL    Eosinophils (Absolute) 282 15 - 500 cells/uL    Basophils (Absolute) 38 0 - 200 cells/uL    Neutrophils 39 %    Lymphocytes 44 6 %    Monocytes 11 4 %    Eosinophils 4 4 %    Basophils Relative 0 6 %   Hemoglobin A1C With EAG    Collection Time: 09/26/18 11:39 AM   Result Value Ref Range    Hemoglobin A1C 6 4 (H) <5 7 % of total Hgb    Estimated Average Glucose 137 (calc)    Estimated Average Glucose (mmol/L) 7 6 (calc)       Assessment/Plan:    Patient's A1c and LDL are at goal   She will continue metformin 500 milligram daily and  Simvastatin 20 milligram daily  Encouraged to continue with healthy diet and exercise  COPD is stable on current combination of inhalers  Patient uses oxygen as needed if she experiences shortness of breath on exertion  Patient follows up with pulmonology  I will see her back after 6 months with routine labs  Diagnoses and all orders for this visit:    Type 2 diabetes mellitus without complication, without long-term current use of insulin (HCC)  -     metFORMIN (GLUCOPHAGE) 500 mg tablet; Take 1 tablet (500 mg total) by mouth daily with breakfast  -     Comprehensive metabolic panel;  Future  -     Hemoglobin A1C; Future  -     Microalbumin / creatinine urine ratio; Future    Hyperlipidemia, unspecified hyperlipidemia type  -     simvastatin (ZOCOR) 20 mg tablet; Take 1 tablet (20 mg total) by mouth daily at bedtime  -     Lipid panel; Future    Chronic obstructive pulmonary disease, unspecified COPD type (HCC)  -     budesonide-formoterol (SYMBICORT) 160-4 5 mcg/act inhaler;  Inhale 2 puffs 2 (two) times a day

## 2018-10-11 NOTE — PROGRESS NOTES
Assessment and Plan:    Problem List Items Addressed This Visit     None        Health Maintenance Due   Topic Date Due    CRC Screening: Colonoscopy  1950    DM Eye Exam  11/25/1959    DTaP,Tdap,and Td Vaccines (1 - Tdap) 11/25/1970    Pneumococcal PPSV23/PCV13 65+ Years / Low and Medium Risk (2 of 2 - PPSV23) 10/10/2018         HPI:  Alfa Stratton is a 75 y o  female here for her Subsequent Wellness Visit      Patient Active Problem List   Diagnosis    SOB (shortness of breath)    COPD (chronic obstructive pulmonary disease) (HCC)    Type 2 diabetes mellitus without complication, without long-term current use of insulin (HCC)    HLD (hyperlipidemia)    Abnormal chest CT    Pulmonary nodule    Bronchiolitis    Screen for colon cancer     Past Medical History:   Diagnosis Date    [facility] [address] [facility]     [facility] mellitus ([facility]     Hyperlipidemia     Pulmonary nodule 8/31/2017     Past Surgical History:   Procedure Laterality Date    HAND SURGERY      TENDON TRANSFER      Left lower arm     Family History   Problem Relation Age of Onset    COPD Sister     Diabetes Sister     Substance Abuse Father     Substance Abuse Family      History   Smoking Status    Former Smoker    Packs/day: 1 00    Years: 47 00    Types: Cigarettes    Start date: 1970    Quit date: 2017   Smokeless Tobacco    Never Used     History   Alcohol Use No      History   Drug Use No       Current Outpatient Prescriptions   Medication Sig Dispense Refill    albuterol (PROVENTIL HFA,VENTOLIN HFA) 90 mcg/act inhaler Inhale 2 puffs every 6 (six) hours as needed for wheezing 1 Inhaler 3    budesonide-formoterol (SYMBICORT) 160-4 5 mcg/act inhaler Inhale 2 puffs 2 (two) times a day 3 Inhaler 1    Calcium 500-100 MG-UNIT CHEW Chew 1 tablet daily      metFORMIN (GLUCOPHAGE) 500 mg tablet Take 1 tablet (500 mg total) by mouth daily with breakfast 90 tablet 1    simvastatin (ZOCOR) 20 mg tablet Take 1 tablet (20 mg total) by mouth daily at bedtime 90 tablet 1    Tiotropium Bromide-Olodaterol (STIOLTO RESPIMAT) 2 5-2 5 MCG/ACT AERS Inhale 2 puffs daily 1 Inhaler 3    ipratropium (ATROVENT) 0 02 % nebulizer solution Take 2 5 mL by nebulization 3 (three) times a day (Patient not taking: Reported on 10/11/2018 ) 75 mL 0     No current facility-administered medications for this visit  Allergies   Allergen Reactions    Prednisone      Facial redness and sob     Immunization History   Administered Date(s) Administered    Influenza 10/01/2012, 09/09/2013, 09/11/2013, 10/01/2014, 10/10/2014, 09/30/2015, 10/11/2016, 09/27/2017, 09/26/2018    Influenza Split High Dose Preservative Free IM 10/03/2017    Pneumococcal Conjugate 13-Valent 10/10/2011    Pneumococcal Polysaccharide PPV23 10/10/2013       Patient Care Team:  Arnoldo Maria MD as PCP - General (Family Medicine)  aGvi Greco MD    Medicare Screening Tests and Risk Assessments:      Health Risk Assessment:  Patient rates overall health as good  Patient feels that their physical health rating is Same  Eyesight was rated as Same  Hearing was rated as Same  Patient feels that their emotional and mental health rating is Same  Pain experienced by patient in the last 7 days has been None  Patient's pain rating has been 1/10  Patient states that she has experienced no weight loss or gain in last 6 months  Emotional/Mental Health:  Patient has been feeling nervous/anxious  PHQ-9 Depression Screening:    Frequency of the following problems over the past two weeks:      1  Little interest or pleasure in doing things: 0 - not at all      2  Feeling down, depressed, or hopeless: 0 - not at all  PHQ-2 Score: 0          Broken Bones/Falls: Fall Risk Assessment:    In the past year, patient has experienced: No history of falling in past year          Bladder/Bowel:  Patient has not leaked urine accidently in the last six months    Patient reports no loss of bowel control  Immunizations:  Patient has had a flu vaccination within the last year  Patient has received a pneumonia shot  Patient has not received a shingles shot  Home Safety:  Patient has trouble with stairs inside or outside of their home  Patient currently reports that there are no safety hazards present in home, working smoke alarms, working carbon monoxide detectors  Preventative Screenings:   No breast cancer screening performed,     Nutrition:  Current diet: Regular with servings of the following:    Medications:  Patient is currently taking over-the-counter supplements  Patient is not able to manage medications  Lifestyle Choices:  Patient reports no tobacco use  Patient has smoked or used tobacco in the past   Patient reports no alcohol use  Patient drives a vehicle  Patient wears seat belt  Activities of Daily Living:  Can get out of bed by his or her self, able to dress self, able to make own meals, able to do own shopping, able to bathe self, can do own laundry/housekeeping, can manage own money, pay bills and track expenses    Previous Hospitalizations:  Hospitalization or ED visit in past 12 months  Number of hospitalizations within the last year: 1-2        Advanced Directives:  Patient has decided on a power of   Patient has spoken to designated power of   Patient has not completed advanced directive          Preventative Screening/Counseling:      Cardiovascular:      General: Risks and Benefits Discussed and Screening Current      Counseling: Healthy Diet, Healthy Weight, Improve Cholesterol, Improve Blood Pressure and Improve Exercise Tolerance          Diabetes:      General: Risks and Benefits Discussed and Screening Current      Counseling: Healthy Diet, Healthy Weight and Improve Physical Activity          Colorectal Cancer:      General: Risks and Benefits Discussed and Screening Current          Breast Cancer: General: Risks and Benefits Discussed and Screening Current          Cervical Cancer:      General: Risks and Benefits Discussed and Screening Not Indicated          Osteoporosis:      General: Risks and Benefits Discussed and Patient Declines          AAA:      General: Risks and Benefits Discussed          Glaucoma:      General: Risks and Benefits Discussed          HIV:      General: Patient Declines          Hepatitis C:      General: Patient Declines        Advanced Directives:   Patient has living will for healthcare, has durable POA for healthcare, patient has an advanced directive  Information on ACP and/or AD provided  End of life assessment reviewed with patient  Provider agrees with end of life decisions   Immunizations:      Influenza: Influenza UTD This Year      Pneumococcal: Lifetime Vaccine Completed      Shingrix: Patient Declines      Zostavax: Patient Declines      TD: Patient Declines      TDAP: Patient Declines      Other Preventative Counseling (Non-Medicare):   Fall Prevention, Increase physical activity, Nutrition Counseling, Car/seat belt/driving safety reviewed and Sunscreen use

## 2018-10-13 DIAGNOSIS — E11.9 TYPE 2 DIABETES MELLITUS WITHOUT COMPLICATION, WITHOUT LONG-TERM CURRENT USE OF INSULIN (HCC): Chronic | ICD-10-CM

## 2018-10-13 DIAGNOSIS — E78.5 HYPERLIPIDEMIA, UNSPECIFIED HYPERLIPIDEMIA TYPE: Chronic | ICD-10-CM

## 2018-10-13 DIAGNOSIS — J44.9 CHRONIC OBSTRUCTIVE PULMONARY DISEASE, UNSPECIFIED COPD TYPE (HCC): Chronic | ICD-10-CM

## 2018-10-15 RX ORDER — BUDESONIDE AND FORMOTEROL FUMARATE DIHYDRATE 160; 4.5 UG/1; UG/1
AEROSOL RESPIRATORY (INHALATION)
Qty: 30.6 G | Refills: 1 | OUTPATIENT
Start: 2018-10-15

## 2018-10-15 RX ORDER — SIMVASTATIN 20 MG
TABLET ORAL
Qty: 90 TABLET | Refills: 1 | OUTPATIENT
Start: 2018-10-15

## 2018-10-23 DIAGNOSIS — E78.5 HYPERLIPIDEMIA, UNSPECIFIED HYPERLIPIDEMIA TYPE: Chronic | ICD-10-CM

## 2018-10-23 DIAGNOSIS — E11.9 TYPE 2 DIABETES MELLITUS WITHOUT COMPLICATION, WITHOUT LONG-TERM CURRENT USE OF INSULIN (HCC): Chronic | ICD-10-CM

## 2018-10-23 DIAGNOSIS — J44.9 CHRONIC OBSTRUCTIVE PULMONARY DISEASE, UNSPECIFIED COPD TYPE (HCC): Chronic | ICD-10-CM

## 2018-10-23 RX ORDER — BUDESONIDE AND FORMOTEROL FUMARATE DIHYDRATE 160; 4.5 UG/1; UG/1
2 AEROSOL RESPIRATORY (INHALATION) 2 TIMES DAILY
Qty: 3 INHALER | Refills: 1 | Status: SHIPPED | OUTPATIENT
Start: 2018-10-23 | End: 2019-04-03 | Stop reason: SDUPTHER

## 2018-10-23 RX ORDER — SIMVASTATIN 20 MG
20 TABLET ORAL
Qty: 90 TABLET | Refills: 1 | Status: SHIPPED | OUTPATIENT
Start: 2018-10-23 | End: 2019-04-18 | Stop reason: SDUPTHER

## 2018-10-23 NOTE — TELEPHONE ENCOUNTER
Patient's mail order pharmacy recently  Can you please resend her meds to [facility]  I updated the pharmacy

## 2018-11-09 ENCOUNTER — OFFICE VISIT (OUTPATIENT)
Dept: PULMONOLOGY | Facility: CLINIC | Age: 68
End: 2018-11-09
Payer: MEDICARE

## 2018-11-09 VITALS
WEIGHT: 152 LBS | TEMPERATURE: 97.8 F | SYSTOLIC BLOOD PRESSURE: 104 MMHG | OXYGEN SATURATION: 91 % | HEIGHT: 59 IN | BODY MASS INDEX: 30.64 KG/M2 | HEART RATE: 111 BPM | RESPIRATION RATE: 18 BRPM | DIASTOLIC BLOOD PRESSURE: 60 MMHG

## 2018-11-09 DIAGNOSIS — J44.9 CHRONIC OBSTRUCTIVE PULMONARY DISEASE, UNSPECIFIED COPD TYPE (HCC): Primary | Chronic | ICD-10-CM

## 2018-11-09 PROCEDURE — 99213 OFFICE O/P EST LOW 20 MIN: CPT | Performed by: INTERNAL MEDICINE

## 2018-11-15 NOTE — PROGRESS NOTES
Progress Note - Pulmonary   Monique Needle 75 y o  female MRN: [id number]   Encounter: [id number]      Assessment/Plan:  Patient is 79-year-old female with history of COPD who presents for follow-up  The patient reports he has been doing remarkably well with no significant planes at this time  She does report her breathing was quite difficult over the summer with hot humid weather  This has resolved with the changes C since  She may continue her current inhaler therapy with steel toe and p r n  Albuterol  Plan:  -  Refilled inhalers    Subjective:   No acute events  Patient has no complaints this time  Again reports her breathing has improved since the weather has changed  Denies cough, congestion, fevers or chills  She reports inhalers are providing significant benefit for  She is able to complete her activities about difficulties at this time  Inhaler Regimen:  Stiolto  Albuterol    Remainder of 12 point review of systems negative except as described in HPI  The following portions of the patient's history were reviewed and updated as appropriate: allergies, current medications, past family history, past medical history, past social history, past surgical history and problem list      Objective:   Vitals: Blood pressure 104/60, pulse (!) 111, temperature 97 8 °F (36 6 °C), temperature source Tympanic, resp  rate 18, height 4' 11" (1 499 m), weight 68 9 kg (152 lb), SpO2 91 % , RA, Body mass index is 30 7 kg/m²      Physical Exam  Gen: Awake, alert, oriented x 3, no acute distress  HEENT: Mucous membranes moist, no oral lesions, no thrush  NECK: No accessory muscle use, JVP not elevated  Cardiac: Regular, single S1, single S2, no murmurs, no rubs, no gallops  Lungs: diminished bilaterally  Abdomen: normoactive bowel sounds, soft nontender, nondistended, no rebound or rigidity, no guarding  Extremities: no cyanosis, no clubbing, no edema  MSK:  Strength equal in all extremities  Derm:  No rashes/lesions noted  Neuro:  Appropriate mood/affect    Labs: I have personally reviewed pertinent lab results  Lab Results   Component Value Date    WBC 6 4 09/26/2018    WBC 9 57 09/11/2017    HGB 13 5 09/26/2018    HGB 10 8 (L) 09/11/2017     09/26/2018     09/11/2017     Lab Results   Component Value Date    CREATININE 0 74 09/11/2017       Imaging and other studies: I have personally reviewed pertinent reports  and I have personally reviewed pertinent films in PACS  9/18/2018:  Stable emphysema with 6 mm right lower lobe nodule  No new nodule  Pulmonary Function Testing: I have personally reviewed pertinent reports  9/27/2017:  FEV1/FVC Ratio: 54 %  Forced Vital Capacity: 1 42 L    57 % predicted  FEV1: 0 77 L     41 % predicted  After administration of bronchodilator FEV1: reduced by 3%     Lung volumes by body plethysmography: Total Lung Capacity 103 % predicted Residual volume 171 % predicted     DLCO corrected for patients hemoglobin level: not able to be performed    Argelia Gómez, [address]

## 2018-12-21 ENCOUNTER — TELEPHONE (OUTPATIENT)
Dept: PULMONOLOGY | Facility: CLINIC | Age: 68
End: 2018-12-21

## 2018-12-21 NOTE — TELEPHONE ENCOUNTER
calling for [place]  They just found out their grandson is positive for influenza A and strep  They were told to call us to see if she should be on anything preventative or wait and see if she develops any symptoms  She is not having any symptoms at this time  I did advise we usually do not prescribe preventative medication and if she becomes symptomatic over the weekend, to go to an urgent care to be swabbed

## 2018-12-21 NOTE — TELEPHONE ENCOUNTER
said they were only in contact with him for a few hours and will not see him again for a while  He said the child was fine while they saw him and he got sick the next day

## 2019-04-03 DIAGNOSIS — J44.9 CHRONIC OBSTRUCTIVE PULMONARY DISEASE, UNSPECIFIED COPD TYPE (HCC): Chronic | ICD-10-CM

## 2019-04-03 RX ORDER — BUDESONIDE AND FORMOTEROL FUMARATE DIHYDRATE 160; 4.5 UG/1; UG/1
2 AEROSOL RESPIRATORY (INHALATION) 2 TIMES DAILY
Qty: 3 INHALER | Refills: 1 | Status: SHIPPED | OUTPATIENT
Start: 2019-04-03 | End: 2019-04-18 | Stop reason: SDUPTHER

## 2019-04-03 RX ORDER — BUDESONIDE AND FORMOTEROL FUMARATE DIHYDRATE 160; 4.5 UG/1; UG/1
AEROSOL RESPIRATORY (INHALATION)
Qty: 30.6 G | Refills: 1 | OUTPATIENT
Start: 2019-04-03

## 2019-04-12 LAB
ALBUMIN SERPL-MCNC: 4.3 G/DL (ref 3.6–5.1)
ALBUMIN/CREAT UR: 8 MCG/MG CREAT
ALBUMIN/GLOB SERPL: 1.8 (CALC) (ref 1–2.5)
ALP SERPL-CCNC: 64 U/L (ref 33–130)
ALT SERPL-CCNC: 17 U/L (ref 6–29)
AST SERPL-CCNC: 18 U/L (ref 10–35)
BILIRUB SERPL-MCNC: 0.5 MG/DL (ref 0.2–1.2)
BUN SERPL-MCNC: 15 MG/DL (ref 7–25)
BUN/CREAT SERPL: NORMAL (CALC) (ref 6–22)
CALCIUM SERPL-MCNC: 9.4 MG/DL (ref 8.6–10.4)
CHLORIDE SERPL-SCNC: 104 MMOL/L (ref 98–110)
CHOLEST SERPL-MCNC: 163 MG/DL
CHOLEST/HDLC SERPL: 2.2 (CALC)
CO2 SERPL-SCNC: 29 MMOL/L (ref 20–32)
CREAT SERPL-MCNC: 0.77 MG/DL (ref 0.5–0.99)
CREAT UR-MCNC: 132 MG/DL (ref 20–275)
GLOBULIN SER CALC-MCNC: 2.4 G/DL (CALC) (ref 1.9–3.7)
GLUCOSE SERPL-MCNC: 99 MG/DL (ref 65–99)
HBA1C MFR BLD: 6.2 % OF TOTAL HGB
HDLC SERPL-MCNC: 73 MG/DL
LDLC SERPL CALC-MCNC: 69 MG/DL (CALC)
MICROALBUMIN UR-MCNC: 1 MG/DL
NONHDLC SERPL-MCNC: 90 MG/DL (CALC)
POTASSIUM SERPL-SCNC: 4.2 MMOL/L (ref 3.5–5.3)
PROT SERPL-MCNC: 6.7 G/DL (ref 6.1–8.1)
SL AMB EGFR AFRICAN AMERICAN: 91 ML/MIN/1.73M2
SL AMB EGFR NON AFRICAN AMERICAN: 79 ML/MIN/1.73M2
SODIUM SERPL-SCNC: 140 MMOL/L (ref 135–146)
TRIGL SERPL-MCNC: 133 MG/DL

## 2019-04-16 DIAGNOSIS — E78.5 HYPERLIPIDEMIA, UNSPECIFIED HYPERLIPIDEMIA TYPE: Chronic | ICD-10-CM

## 2019-04-16 DIAGNOSIS — E11.9 TYPE 2 DIABETES MELLITUS WITHOUT COMPLICATION, WITHOUT LONG-TERM CURRENT USE OF INSULIN (HCC): Chronic | ICD-10-CM

## 2019-04-17 RX ORDER — SIMVASTATIN 20 MG
TABLET ORAL
Qty: 90 TABLET | Refills: 1 | OUTPATIENT
Start: 2019-04-17

## 2019-04-18 ENCOUNTER — OFFICE VISIT (OUTPATIENT)
Dept: FAMILY MEDICINE CLINIC | Facility: CLINIC | Age: 69
End: 2019-04-18
Payer: MEDICARE

## 2019-04-18 VITALS
DIASTOLIC BLOOD PRESSURE: 80 MMHG | OXYGEN SATURATION: 94 % | SYSTOLIC BLOOD PRESSURE: 122 MMHG | HEART RATE: 118 BPM | HEIGHT: 59 IN | RESPIRATION RATE: 18 BRPM | BODY MASS INDEX: 29.64 KG/M2 | WEIGHT: 147 LBS

## 2019-04-18 DIAGNOSIS — M54.2 MUSCULOSKELETAL NECK PAIN: ICD-10-CM

## 2019-04-18 DIAGNOSIS — J44.9 CHRONIC OBSTRUCTIVE PULMONARY DISEASE, UNSPECIFIED COPD TYPE (HCC): Chronic | ICD-10-CM

## 2019-04-18 DIAGNOSIS — Z23 NEED FOR PNEUMOCOCCAL VACCINATION: ICD-10-CM

## 2019-04-18 DIAGNOSIS — E11.9 TYPE 2 DIABETES MELLITUS WITHOUT COMPLICATION, WITHOUT LONG-TERM CURRENT USE OF INSULIN (HCC): Primary | Chronic | ICD-10-CM

## 2019-04-18 DIAGNOSIS — E78.5 HYPERLIPIDEMIA, UNSPECIFIED HYPERLIPIDEMIA TYPE: Chronic | ICD-10-CM

## 2019-04-18 PROBLEM — J21.9 BRONCHIOLITIS: Status: RESOLVED | Noted: 2017-09-14 | Resolved: 2019-04-18

## 2019-04-18 PROCEDURE — 90732 PPSV23 VACC 2 YRS+ SUBQ/IM: CPT | Performed by: FAMILY MEDICINE

## 2019-04-18 PROCEDURE — 99215 OFFICE O/P EST HI 40 MIN: CPT | Performed by: FAMILY MEDICINE

## 2019-04-18 PROCEDURE — G0009 ADMIN PNEUMOCOCCAL VACCINE: HCPCS | Performed by: FAMILY MEDICINE

## 2019-04-18 RX ORDER — BUDESONIDE AND FORMOTEROL FUMARATE DIHYDRATE 160; 4.5 UG/1; UG/1
2 AEROSOL RESPIRATORY (INHALATION) 2 TIMES DAILY
Qty: 3 INHALER | Refills: 1 | Status: SHIPPED | OUTPATIENT
Start: 2019-04-18 | End: 2019-10-08 | Stop reason: SDUPTHER

## 2019-04-18 RX ORDER — SIMVASTATIN 20 MG
20 TABLET ORAL
Qty: 90 TABLET | Refills: 1 | Status: SHIPPED | OUTPATIENT
Start: 2019-04-18 | End: 2019-10-08 | Stop reason: SDUPTHER

## 2019-04-18 RX ORDER — CYCLOBENZAPRINE HCL 5 MG
10 TABLET ORAL 3 TIMES DAILY PRN
Qty: 15 TABLET | Refills: 0 | Status: SHIPPED | OUTPATIENT
Start: 2019-04-18 | End: 2019-05-24 | Stop reason: ALTCHOICE

## 2019-05-24 ENCOUNTER — OFFICE VISIT (OUTPATIENT)
Dept: PULMONOLOGY | Facility: CLINIC | Age: 69
End: 2019-05-24
Payer: MEDICARE

## 2019-05-24 VITALS
HEIGHT: 59 IN | WEIGHT: 147.9 LBS | OXYGEN SATURATION: 95 % | BODY MASS INDEX: 29.82 KG/M2 | HEART RATE: 117 BPM | TEMPERATURE: 98.6 F | DIASTOLIC BLOOD PRESSURE: 76 MMHG | SYSTOLIC BLOOD PRESSURE: 120 MMHG

## 2019-05-24 DIAGNOSIS — J44.9 CHRONIC OBSTRUCTIVE PULMONARY DISEASE, UNSPECIFIED COPD TYPE (HCC): Chronic | ICD-10-CM

## 2019-05-24 DIAGNOSIS — F17.201 TOBACCO ABUSE, IN REMISSION: Primary | ICD-10-CM

## 2019-05-24 PROCEDURE — 99214 OFFICE O/P EST MOD 30 MIN: CPT | Performed by: INTERNAL MEDICINE

## 2019-05-24 RX ORDER — LEVALBUTEROL INHALATION SOLUTION 1.25 MG/3ML
1.25 SOLUTION RESPIRATORY (INHALATION) EVERY 8 HOURS PRN
Status: DISCONTINUED | OUTPATIENT
Start: 2019-05-24 | End: 2020-04-16

## 2019-05-24 RX ORDER — LEVALBUTEROL INHALATION SOLUTION 1.25 MG/3ML
1.25 SOLUTION RESPIRATORY (INHALATION) 3 TIMES DAILY
Qty: 60 ML | Refills: 3 | Status: SHIPPED | OUTPATIENT
Start: 2019-05-24 | End: 2020-02-11

## 2019-05-24 RX ORDER — LEVALBUTEROL INHALATION SOLUTION 1.25 MG/3ML
1.25 SOLUTION RESPIRATORY (INHALATION) EVERY 8 HOURS PRN
Qty: 60 VIAL | Refills: 3 | Status: SHIPPED | OUTPATIENT
Start: 2019-05-24 | End: 2019-10-17

## 2019-09-23 ENCOUNTER — HOSPITAL ENCOUNTER (OUTPATIENT)
Dept: CT IMAGING | Facility: HOSPITAL | Age: 69
Discharge: HOME | End: 2019-09-23
Attending: INTERNAL MEDICINE
Payer: COMMERCIAL

## 2019-09-23 DIAGNOSIS — F17.201 TOBACCO ABUSE, IN REMISSION: ICD-10-CM

## 2019-09-23 DIAGNOSIS — J44.9 CHRONIC OBSTRUCTIVE PULMONARY DISEASE, UNSPECIFIED COPD TYPE (HCC): Primary | Chronic | ICD-10-CM

## 2019-09-23 PROCEDURE — G0297 LDCT FOR LUNG CA SCREEN: HCPCS

## 2019-09-24 LAB
ALBUMIN SERPL-MCNC: 4.2 G/DL (ref 3.6–5.1)
ALBUMIN/GLOB SERPL: 1.6 (CALC) (ref 1–2.5)
ALP SERPL-CCNC: 72 U/L (ref 33–130)
ALT SERPL-CCNC: 15 U/L (ref 6–29)
AST SERPL-CCNC: 16 U/L (ref 10–35)
BILIRUB SERPL-MCNC: 0.5 MG/DL (ref 0.2–1.2)
BUN SERPL-MCNC: 12 MG/DL (ref 7–25)
BUN/CREAT SERPL: ABNORMAL (CALC) (ref 6–22)
CALCIUM SERPL-MCNC: 9.5 MG/DL (ref 8.6–10.4)
CHLORIDE SERPL-SCNC: 102 MMOL/L (ref 98–110)
CHOLEST SERPL-MCNC: 152 MG/DL
CHOLEST/HDLC SERPL: 2.3 (CALC)
CO2 SERPL-SCNC: 29 MMOL/L (ref 20–32)
CREAT SERPL-MCNC: 0.82 MG/DL (ref 0.5–0.99)
GLOBULIN SER CALC-MCNC: 2.6 G/DL (CALC) (ref 1.9–3.7)
GLUCOSE SERPL-MCNC: 112 MG/DL (ref 65–99)
HBA1C MFR BLD: 6.5 % OF TOTAL HGB
HDLC SERPL-MCNC: 66 MG/DL
LDLC SERPL CALC-MCNC: 66 MG/DL (CALC)
NONHDLC SERPL-MCNC: 86 MG/DL (CALC)
POTASSIUM SERPL-SCNC: 4.3 MMOL/L (ref 3.5–5.3)
PROT SERPL-MCNC: 6.8 G/DL (ref 6.1–8.1)
SL AMB EGFR AFRICAN AMERICAN: 85 ML/MIN/1.73M2
SL AMB EGFR NON AFRICAN AMERICAN: 73 ML/MIN/1.73M2
SODIUM SERPL-SCNC: 140 MMOL/L (ref 135–146)
TRIGL SERPL-MCNC: 117 MG/DL

## 2019-10-08 DIAGNOSIS — E11.9 TYPE 2 DIABETES MELLITUS WITHOUT COMPLICATION, WITHOUT LONG-TERM CURRENT USE OF INSULIN (HCC): Chronic | ICD-10-CM

## 2019-10-08 DIAGNOSIS — E78.5 HYPERLIPIDEMIA, UNSPECIFIED HYPERLIPIDEMIA TYPE: Chronic | ICD-10-CM

## 2019-10-08 DIAGNOSIS — J44.9 CHRONIC OBSTRUCTIVE PULMONARY DISEASE, UNSPECIFIED COPD TYPE (HCC): Chronic | ICD-10-CM

## 2019-10-10 RX ORDER — SIMVASTATIN 20 MG
TABLET ORAL
Qty: 90 TABLET | Refills: 1 | Status: SHIPPED | OUTPATIENT
Start: 2019-10-10 | End: 2020-03-20 | Stop reason: SDUPTHER

## 2019-10-10 RX ORDER — BUDESONIDE AND FORMOTEROL FUMARATE DIHYDRATE 160; 4.5 UG/1; UG/1
AEROSOL RESPIRATORY (INHALATION)
Qty: 30.6 G | Refills: 1 | Status: SHIPPED | OUTPATIENT
Start: 2019-10-10 | End: 2020-03-20 | Stop reason: SDUPTHER

## 2019-10-17 ENCOUNTER — OFFICE VISIT (OUTPATIENT)
Dept: FAMILY MEDICINE CLINIC | Facility: CLINIC | Age: 69
End: 2019-10-17
Payer: MEDICARE

## 2019-10-17 VITALS
WEIGHT: 149 LBS | HEIGHT: 59 IN | BODY MASS INDEX: 30.04 KG/M2 | OXYGEN SATURATION: 95 % | SYSTOLIC BLOOD PRESSURE: 122 MMHG | DIASTOLIC BLOOD PRESSURE: 62 MMHG | HEART RATE: 117 BPM

## 2019-10-17 DIAGNOSIS — Z12.31 VISIT FOR SCREENING MAMMOGRAM: ICD-10-CM

## 2019-10-17 DIAGNOSIS — E78.5 HYPERLIPIDEMIA, UNSPECIFIED HYPERLIPIDEMIA TYPE: ICD-10-CM

## 2019-10-17 DIAGNOSIS — F17.201 TOBACCO ABUSE, IN REMISSION: ICD-10-CM

## 2019-10-17 DIAGNOSIS — E11.9 TYPE 2 DIABETES MELLITUS WITHOUT COMPLICATION, WITHOUT LONG-TERM CURRENT USE OF INSULIN (HCC): Primary | Chronic | ICD-10-CM

## 2019-10-17 DIAGNOSIS — Z12.11 SCREEN FOR COLON CANCER: ICD-10-CM

## 2019-10-17 DIAGNOSIS — J44.9 CHRONIC OBSTRUCTIVE PULMONARY DISEASE, UNSPECIFIED COPD TYPE (HCC): Chronic | ICD-10-CM

## 2019-10-17 DIAGNOSIS — R00.0 CHRONIC TACHYCARDIA: ICD-10-CM

## 2019-10-17 DIAGNOSIS — Z00.00 MEDICARE ANNUAL WELLNESS VISIT, SUBSEQUENT: ICD-10-CM

## 2019-10-17 PROCEDURE — 99214 OFFICE O/P EST MOD 30 MIN: CPT | Performed by: FAMILY MEDICINE

## 2019-10-17 PROCEDURE — G0439 PPPS, SUBSEQ VISIT: HCPCS | Performed by: FAMILY MEDICINE

## 2019-10-17 NOTE — ASSESSMENT & PLAN NOTE
Says that it is anxiety related  Denies any symptoms pf chest pain/ sob/ palpitations  Does not want to pursue any further cardiac workup  Says that she garcia snot want to see any more specialists  She will see a cardiologist if any of her symptoms change

## 2019-10-17 NOTE — ASSESSMENT & PLAN NOTE
LDL at goal on simvastatin 20 milligram   Continue same  Check lipid panel after 6 months and follow up thereafter

## 2019-10-17 NOTE — ASSESSMENT & PLAN NOTE
Lab Results   Component Value Date    HGBA1C 6 5 (H) 09/23/2019     A1c stable on metformin 500 milligram daily  Continue same  Patient advised to continue with low carb low-fat diet  Advised metabolic labs and follow-up after 6 months  The patient has not been to her eye specialist for over 2 years now, states that she is due for an eye checkup    Advised to follow up with her ophthalmologist

## 2019-10-17 NOTE — PROGRESS NOTES
Was within Subjective:      Patient ID: Joe Staples is a 70 y o  female  Hyperlipidemia   This is a chronic problem  The current episode started more than 1 year ago  The problem is controlled  Recent lipid tests were reviewed and are normal  Pertinent negatives include no chest pain, focal sensory loss, focal weakness, myalgias or shortness of breath  Current antihyperlipidemic treatment includes statins  The current treatment provides significant improvement of lipids  There are no compliance problems  Risk factors for coronary artery disease include diabetes mellitus, dyslipidemia and post-menopausal    Diabetes   She presents for her follow-up diabetic visit  She has type 2 diabetes mellitus  Her disease course has been stable  Pertinent negatives for hypoglycemia include no pallor  There are no diabetic associated symptoms  Pertinent negatives for diabetes include no chest pain, no polydipsia and no polyphagia  There are no hypoglycemic complications  Symptoms are stable (A1c 6 5)  There are no diabetic complications  Risk factors for coronary artery disease include diabetes mellitus, dyslipidemia and post-menopausal  Current diabetic treatments: Metformin 500 milligram daily  She is compliant with treatment all of the time  She participates in exercise intermittently  An ACE inhibitor/angiotensin II receptor blocker is not being taken  She does not see a podiatrist Eye exam is not current  Patient noted to have tachycardia on exam  Says that she is always nervous for her physicians  Appointments  Denies any symptoms pf chest pain/ sob/ palpitations  Does not want to pursue any further cardiac workup  Says that she garcia snot want to see any more specialists  She will see a cardiologist if any of her symptoms change  Patient has h/o COPD, breathing at baseline on maintenance inhalers  Patient follows up with pulmonary       Past Medical History:   Diagnosis Date    [facility] [address] [facility]     [facility] mellitus ([facility] )     Hyperlipidemia     Pulmonary nodule 8/31/2017       Family History   Problem Relation Age of Onset    COPD Sister     Diabetes Sister     Substance Abuse Father     Substance Abuse Family        Past Surgical History:   Procedure Laterality Date    HAND SURGERY      TENDON TRANSFER      Left lower arm        reports that she quit smoking about 2 years ago  Her smoking use included cigarettes  She started smoking about 49 years ago  She has a 47 00 pack-year smoking history  She has never used smokeless tobacco  She reports that she does not drink alcohol or use drugs        Current Outpatient Medications:     albuterol (PROVENTIL HFA,VENTOLIN HFA) 90 mcg/act inhaler, Inhale 2 puffs every 6 (six) hours as needed for wheezing, Disp: 1 Inhaler, Rfl: 3    Calcium 500-100 MG-UNIT CHEW, Chew 1 tablet daily, Disp: , Rfl:     ipratropium (ATROVENT) 0 02 % nebulizer solution, Take 2 5 mL by nebulization 3 (three) times a day, Disp: 75 mL, Rfl: 0    levalbuterol (XOPENEX) 1 25 mg/3 mL nebulizer solution, Take 1 vial (1 25 mg total) by nebulization 3 (three) times a day, Disp: 60 mL, Rfl: 3    metFORMIN (GLUCOPHAGE) 500 mg tablet, TAKE 1 TABLET DAILY WITH   BREAKFAST, Disp: 90 tablet, Rfl: 1    simvastatin (ZOCOR) 20 mg tablet, TAKE 1 TABLET DAILY AT     BEDTIME, Disp: 90 tablet, Rfl: 1    SYMBICORT 160-4 5 MCG/ACT inhaler, USE 2 INHALATIONS ORALLY   TWICE DAILY, Disp: 30 6 g, Rfl: 1    tiotropium-olodaterol (STIOLTO RESPIMAT) 2 5-2 5 MCG/ACT inhaler, Inhale 2 puffs daily, Disp: 4 Inhaler, Rfl: 3    Current Facility-Administered Medications:     levalbuterol (XOPENEX) inhalation solution 1 25 mg, 1 25 mg, Nebulization, Q8H PRN, Mahamed Morgan MD    The following portions of the patient's history were reviewed and updated as appropriate: allergies, current medications, past family history, past medical history, past social history, past surgical history and problem list     Review of Systems   Constitutional: Negative  Respiratory: Negative  Negative for shortness of breath  Cardiovascular: Negative  Negative for chest pain and palpitations  Endocrine: Negative for polydipsia and polyphagia  Musculoskeletal: Negative for myalgias  Skin: Negative for pallor  Neurological: Negative  Negative for focal weakness  Psychiatric/Behavioral: Negative  Objective:    /62 (BP Location: Left arm, Patient Position: Sitting, Cuff Size: Large)   Pulse (!) 117   Ht 4' 11" (1 499 m)   Wt 67 6 kg (149 lb)   SpO2 95%   BMI 30 09 kg/m²      Physical Exam   Constitutional: She is oriented to person, place, and time  She appears well-developed and well-nourished  Cardiovascular: Normal rate, regular rhythm and normal heart sounds  Pulmonary/Chest: Effort normal and breath sounds normal    Abdominal: Soft  Bowel sounds are normal    Neurological: She is alert and oriented to person, place, and time     Psychiatric: Her behavior is normal  Judgment normal          Recent Results (from the past 1008 hour(s))   Lipid panel    Collection Time: 09/23/19  1:56 PM   Result Value Ref Range    Total Cholesterol 152 <200 mg/dL    HDL 66 >50 mg/dL    Triglycerides 117 <150 mg/dL    LDL Direct 66 mg/dL (calc)    Chol HDLC Ratio 2 3 <5 0 (calc)    Non-HDL Cholesterol 86 <130 mg/dL (calc)   Comprehensive metabolic panel    Collection Time: 09/23/19  1:56 PM   Result Value Ref Range    Glucose, Random 112 (H) 65 - 99 mg/dL    BUN 12 7 - 25 mg/dL    Creatinine 0 82 0 50 - 0 99 mg/dL    eGFR Non  73 > OR = 60 mL/min/1 73m2    eGFR  85 > OR = 60 mL/min/1 73m2    SL AMB BUN/CREATININE RATIO NOT APPLICABLE 6 - 22 (calc)    Sodium 140 135 - 146 mmol/L    Potassium 4 3 3 5 - 5 3 mmol/L    Chloride 102 98 - 110 mmol/L    CO2 29 20 - 32 mmol/L    SL AMB CALCIUM 9 5 8 6 - 10 4 mg/dL    Protein, Total 6 8 6 1 - 8 1 g/dL    Albumin 4 2 3 6 - 5 1 g/dL    Globulin 2 6 1 9 - 3 7 g/dL (calc)    Albumin/Globulin Ratio 1 6 1 0 - 2 5 (calc)    TOTAL BILIRUBIN 0 5 0 2 - 1 2 mg/dL    Alkaline Phosphatase 72 33 - 130 U/L    AST 16 10 - 35 U/L    ALT 15 6 - 29 U/L   Hemoglobin A1c (w/out EAG)    Collection Time: 09/23/19  1:56 PM   Result Value Ref Range    Hemoglobin A1C 6 5 (H) <5 7 % of total Hgb       Assessment/Plan:         Problem List Items Addressed This Visit        Endocrine    Type 2 diabetes mellitus without complication, without long-term current use of insulin (HCC) - Primary (Chronic)       Lab Results   Component Value Date    HGBA1C 6 5 (H) 09/23/2019     A1c stable on metformin 500 milligram daily  Continue same  Patient advised to continue with low carb low-fat diet  Advised metabolic labs and follow-up after 6 months  The patient has not been to her eye specialist for over 2 years now, states that she is due for an eye checkup  Advised to follow up with her ophthalmologist          Relevant Orders    Comprehensive metabolic panel    Hemoglobin A1C    Microalbumin / creatinine urine ratio       Respiratory    COPD (chronic obstructive pulmonary disease) (HCC) (Chronic)     Symptoms stable on maintenance inhalers  Continue management per her pulmonologist             Other    HLD (hyperlipidemia) (Chronic)     LDL at goal on simvastatin 20 milligram   Continue same  Check lipid panel after 6 months and follow up thereafter  Relevant Orders    Lipid panel    Screen for colon cancer    Relevant Orders    Ambulatory referral to Gastroenterology    Tobacco abuse, in remission    Chronic tachycardia     Says that it is anxiety related  Denies any symptoms pf chest pain/ sob/ palpitations  Does not want to pursue any further cardiac workup  Says that she garcia snot want to see any more specialists  She will see a cardiologist if any of her symptoms change

## 2019-10-17 NOTE — PATIENT INSTRUCTIONS
Medicare Preventive Visit Patient Instructions  Thank you for completing your Welcome to Medicare Visit or Medicare Annual Wellness Visit today  Your next wellness visit will be due in one year (10/17/2020)  The screening/preventive services that you may require over the next 5-10 years are detailed below  Some tests may not apply to you based off risk factors and/or age  Screening tests ordered at today's visit but not completed yet may show as past due  Also, please note that scanned in results may not display below  Preventive Screenings:  Service Recommendations Previous Testing/Comments   Colorectal Cancer Screening  * Colonoscopy    * Fecal Occult Blood Test (FOBT)/Fecal Immunochemical Test (FIT)  * Fecal DNA/Cologuard Test  * Flexible Sigmoidoscopy Age: 54-65 years old   Colonoscopy: every 10 years (may be performed more frequently if at higher risk)  OR  FOBT/FIT: every 1 year  OR  Cologuard: every 3 years  OR  Sigmoidoscopy: every 5 years  Screening may be recommended earlier than age 48 if at higher risk for colorectal cancer  Also, an individualized decision between you and your healthcare provider will decide whether screening between the ages of 74-80 would be appropriate  Colonoscopy: 03/02/2004  FOBT/FIT: Not on file  Cologuard: Not on file  Sigmoidoscopy: Not on file         Breast Cancer Screening Age: 36 years old  Frequency: every 1-2 years  Not required if history of left and right mastectomy Mammogram: 09/30/2015    Screening Current   Cervical Cancer Screening Between the ages of 21-29, pap smear recommended once every 3 years  Between the ages of 33-67, can perform pap smear with HPV co-testing every 5 years     Recommendations may differ for women with a history of total hysterectomy, cervical cancer, or abnormal pap smears in past  Pap Smear: Not on file    Screening Not Indicated   Hepatitis C Screening Once for adults born between 1945 and 1965  More frequently in patients at high risk for Hepatitis C Hep C Antibody: Not on file       Diabetes Screening 1-2 times per year if you're at risk for diabetes or have pre-diabetes Fasting glucose: No results in last 5 years   A1C: 6 5 % of total Hgb    Screening Not Indicated  History Diabetes   Cholesterol Screening Once every 5 years if you don't have a lipid disorder  May order more often based on risk factors  Lipid panel: 09/23/2019    Screening Not Indicated  History Lipid Disorder     Other Preventive Screenings Covered by Medicare:  1  Abdominal Aortic Aneurysm (AAA) Screening: covered once if your at risk  You're considered to be at risk if you have a family history of AAA  2  Lung Cancer Screening: covers low dose CT scan once per year if you meet all of the following conditions: (1) Age 50-69; (2) No signs or symptoms of lung cancer; (3) Current smoker or have quit smoking within the last 15 years; (4) You have a tobacco smoking history of at least 30 pack years (packs per day multiplied by number of years you smoked); (5) You get a written order from a healthcare provider  3  Glaucoma Screening: covered annually if you're considered high risk: (1) You have diabetes OR (2) Family history of glaucoma OR (3)  aged 48 and older OR (3)  American aged 72 and older  3  Osteoporosis Screening: covered every 2 years if you meet one of the following conditions: (1) You're estrogen deficient and at risk for osteoporosis based off medical history and other findings; (2) Have a vertebral abnormality; (3) On glucocorticoid therapy for more than 3 months; (4) Have primary hyperparathyroidism; (5) On osteoporosis medications and need to assess response to drug therapy  · Last bone density test (DXA Scan): Not on file  5  HIV Screening: covered annually if you're between the age of 12-76  Also covered annually if you are younger than 13 and older than 72 with risk factors for HIV infection   For pregnant patients, it is covered up to 3 times per pregnancy  Immunizations:  Immunization Recommendations   Influenza Vaccine Annual influenza vaccination during flu season is recommended for all persons aged >= 6 months who do not have contraindications   Pneumococcal Vaccine (Prevnar and Pneumovax)  * Prevnar = PCV13  * Pneumovax = PPSV23   Adults 25-60 years old: 1-3 doses may be recommended based on certain risk factors  Adults 72 years old: Prevnar (PCV13) vaccine recommended followed by Pneumovax (PPSV23) vaccine  If already received PPSV23 since turning 65, then PCV13 recommended at least one year after PPSV23 dose  Hepatitis B Vaccine 3 dose series if at intermediate or high risk (ex: diabetes, end stage renal disease, liver disease)   Tetanus (Td) Vaccine - COST NOT COVERED BY MEDICARE PART B Following completion of primary series, a booster dose should be given every 10 years to maintain immunity against tetanus  Td may also be given as tetanus wound prophylaxis  Tdap Vaccine - COST NOT COVERED BY MEDICARE PART B Recommended at least once for all adults  For pregnant patients, recommended with each pregnancy  Shingles Vaccine (Shingrix) - COST NOT COVERED BY MEDICARE PART B  2 shot series recommended in those aged 48 and above     Health Maintenance Due:      Topic Date Due    Hepatitis C Screening  1950    Cervical Cancer Screening  11/25/1970    CRC Screening: Colonoscopy  03/02/2014    MAMMOGRAM  03/04/2019     Immunizations Due:      Topic Date Due    HEPATITIS B VACCINES (1 of 3 - Risk 3-dose series) 11/25/1968    DTaP,Tdap,and Td Vaccines (1 - Tdap) 11/25/1970     Advance Directives   What are advance directives? Advance directives are legal documents that state your wishes and plans for medical care  These plans are made ahead of time in case you lose your ability to make decisions for yourself   Advance directives can apply to any medical decision, such as the treatments you want, and if you want to donate organs  What are the types of advance directives? There are many types of advance directives, and each state has rules about how to use them  You may choose a combination of any of the following:  · Living will: This is a written record of the treatment you want  You can also choose which treatments you do not want, which to limit, and which to stop at a certain time  This includes surgery, medicine, IV fluid, and tube feedings  · Durable power of  for healthcare [facility]): This is a written record that states who you want to make healthcare choices for you when you are unable to make them for yourself  This person, called a proxy, is usually a family member or a friend  You may choose more than 1 proxy  · Do not resuscitate (DNR) order:  A DNR order is used in case your heart stops beating or you stop breathing  It is a request not to have certain forms of treatment, such as CPR  A DNR order may be included in other types of advance directives  · Medical directive: This covers the care that you want if you are in a coma, near death, or unable to make decisions for yourself  You can list the treatments you want for each condition  Treatment may include pain medicine, surgery, blood transfusions, dialysis, IV or tube feedings, and a ventilator (breathing machine)  · Values history: This document has questions about your views, beliefs, and how you feel and think about life  This information can help others choose the care that you would choose  Why are advance directives important? An advance directive helps you control your care  Although spoken wishes may be used, it is better to have your wishes written down  Spoken wishes can be misunderstood, or not followed  Treatments may be given even if you do not want them  An advance directive may make it easier for your family to make difficult choices about your care     Weight Management   Why it is important to manage your weight:  Being overweight increases your risk of health conditions such as heart disease, high blood pressure, type 2 diabetes, and certain types of cancer  It can also increase your risk for osteoarthritis, sleep apnea, and other respiratory problems  Aim for a slow, steady weight loss  Even a small amount of weight loss can lower your risk of health problems  How to lose weight safely:  A safe and healthy way to lose weight is to eat fewer calories and get regular exercise  You can lose up about 1 pound a week by decreasing the number of calories you eat by 500 calories each day  Healthy meal plan for weight management:  A healthy meal plan includes a variety of foods, contains fewer calories, and helps you stay healthy  A healthy meal plan includes the following:  · Eat whole-grain foods more often  A healthy meal plan should contain fiber  Fiber is the part of grains, fruits, and vegetables that is not broken down by your body  Whole-grain foods are healthy and provide extra fiber in your diet  Some examples of whole-grain foods are whole-wheat breads and pastas, oatmeal, brown rice, and bulgur  · Eat a variety of vegetables every day  Include dark, leafy greens such as spinach, kale, bharath greens, and mustard greens  Eat yellow and orange vegetables such as carrots, sweet potatoes, and winter squash  · Eat a variety of fruits every day  Choose fresh or canned fruit (canned in its own juice or light syrup) instead of juice  Fruit juice has very little or no fiber  · Eat low-fat dairy foods  Drink fat-free (skim) milk or 1% milk  Eat fat-free yogurt and low-fat cottage cheese  Try low-fat cheeses such as mozzarella and other reduced-fat cheeses  · Choose meat and other protein foods that are low in fat  Choose beans or other legumes such as split peas or lentils  Choose fish, skinless poultry (chicken or turkey), or lean cuts of red meat (beef or pork)  Before you cook meat or poultry, cut off any visible fat     · Use less fat and oil  Try baking foods instead of frying them  Add less fat, such as margarine, sour cream, regular salad dressing and mayonnaise to foods  Eat fewer high-fat foods  Some examples of high-fat foods include french fries, doughnuts, ice cream, and cakes  · Eat fewer sweets  Limit foods and drinks that are high in sugar  This includes candy, cookies, regular soda, and sweetened drinks  Exercise:  Exercise at least 30 minutes per day on most days of the week  Some examples of exercise include walking, biking, dancing, and swimming  You can also fit in more physical activity by taking the stairs instead of the elevator or parking farther away from stores  Ask your healthcare provider about the best exercise plan for you  © Copyright [employer] 2018 Information is for End User's use only and may not be sold, redistributed or otherwise used for commercial purposes   All illustrations and images included in CareNotes® are the copyrighted property of A D [employer] M [employer]  or [address] [place] [address]

## 2019-10-17 NOTE — PROGRESS NOTES
Assessment and Plan:     Problem List Items Addressed This Visit     None           Preventive health issues were discussed with patient, and age appropriate screening tests were ordered as noted in patient's After Visit Summary  Personalized health advice and appropriate referrals for health education or preventive services given if needed, as noted in patient's After Visit Summary       History of Present Illness:     Patient presents for Medicare Annual Wellness visit    Patient Care Team:  Cynthia Laguna MD as PCP - General (Family Medicine)  Anupam Hilton MD     Problem List:     Patient Active Problem List   Diagnosis    SOB (shortness of breath)    COPD (chronic obstructive pulmonary disease) [facility] )    Type 2 diabetes mellitus without complication, without long-term current use of insulin [facility]    HLD (hyperlipidemia)    Abnormal chest CT    Pulmonary nodule    Screen for colon cancer    Musculoskeletal neck pain    Need for pneumococcal vaccination    Tobacco abuse, in remission      Past Medical and Surgical History:     Past Medical History:   Diagnosis Date    [facility] obstructive [facility]) [facility]     Diabetes mellitus ([facility] )     Hyperlipidemia     Pulmonary nodule 8/31/2017     Past Surgical History:   Procedure Laterality Date    HAND SURGERY      TENDON TRANSFER      Left lower arm      Family History:     Family History   Problem Relation Age of Onset    COPD Sister     Diabetes Sister     Substance Abuse Father     Substance Abuse Family       Social History:     Social History     Socioeconomic History    Marital status: /Civil Union     Spouse name: Not on file    Number of children: Not on file    Years of education: Not on file    Highest education level: Not on file   Occupational History    Not on file   Social Needs    Financial resource strain: Not on file    Food insecurity:     Worry: Not on file     Inability: Not on file   Renee Hernandez Transportation needs:     Medical: Not on file     Non-medical: Not on file   Tobacco Use    Smoking status: Former Smoker     Packs/day: 1 00     Years: 47 00     Pack years: 47 00     Types: Cigarettes     Start date:      Last attempt to quit: 2017     Years since quittin 7    Smokeless tobacco: Never Used   Substance and Sexual Activity    Alcohol use: No    Drug use: No    Sexual activity: Not on file   Lifestyle    Physical activity:     Days per week: Not on file     Minutes per session: Not on file    Stress: Not on file   Relationships    Social connections:     Talks on phone: Not on file     Gets together: Not on file     Attends Jewish service: Not on file     Active member of club or organization: Not on file     Attends meetings of clubs or organizations: Not on file     Relationship status: Not on file    Intimate partner violence:     Fear of current or ex partner: Not on file     Emotionally abused: Not on file     Physically abused: Not on file     Forced sexual activity: Not on file   Other Topics Concern    Not on file   Social History Narrative    Not on file       Medications and Allergies:     Current Outpatient Medications   Medication Sig Dispense Refill    albuterol (PROVENTIL HFA,VENTOLIN HFA) 90 mcg/act inhaler Inhale 2 puffs every 6 (six) hours as needed for wheezing 1 Inhaler 3    Calcium 500-100 MG-UNIT CHEW Chew 1 tablet daily      ipratropium (ATROVENT) 0 02 % nebulizer solution Take 2 5 mL by nebulization 3 (three) times a day 75 mL 0    levalbuterol (XOPENEX) 1 25 mg/3 mL nebulizer solution Take 1 vial (1 25 mg total) by nebulization 3 (three) times a day 60 mL 3    levalbuterol (XOPENEX) 1 25 mg/3 mL nebulizer solution Take 1 vial (1 25 mg total) by nebulization every 8 (eight) hours as needed for wheezing or shortness of breath 60 vial 3    metFORMIN (GLUCOPHAGE) 500 mg tablet TAKE 1 TABLET DAILY WITH   BREAKFAST 90 tablet 1    simvastatin (ZOCOR) 20 mg tablet TAKE 1 TABLET DAILY AT     BEDTIME 90 tablet 1    SYMBICORT 160-4 5 MCG/ACT inhaler USE 2 INHALATIONS ORALLY   TWICE DAILY 30 6 g 1    tiotropium-olodaterol (STIOLTO RESPIMAT) 2 5-2 5 MCG/ACT inhaler Inhale 2 puffs daily 4 Inhaler 3     Current Facility-Administered Medications   Medication Dose Route Frequency Provider Last Rate Last Dose    levalbuterol (XOPENEX) inhalation solution 1 25 mg  1 25 mg Nebulization Q8H PRN Anupam Hilton MD         Allergies   Allergen Reactions    Prednisone      Facial redness and sob      Immunizations:     Immunization History   Administered Date(s) Administered    INFLUENZA 10/01/2012, 09/09/2013, 09/11/2013, 10/01/2014, 10/10/2014, 09/30/2015, 10/11/2016, 09/27/2017, 09/26/2018    Influenza Split High Dose Preservative Free IM 10/03/2017, 09/18/2019    Pneumococcal Conjugate 13-Valent 10/10/2011    Pneumococcal Polysaccharide PPV23 10/10/2013, 04/18/2019      Health Maintenance:         Topic Date Due    Hepatitis C Screening  1950    Cervical Cancer Screening  11/25/1970    CRC Screening: Colonoscopy  03/02/2014    MAMMOGRAM  03/04/2019         Topic Date Due    HEPATITIS B VACCINES (1 of 3 - Risk 3-dose series) 11/25/1968    DTaP,Tdap,and Td Vaccines (1 - Tdap) 11/25/1970      Medicare Health Risk Assessment:     There were no vitals taken for this visit  Ej Contreras is here for her Subsequent Wellness visit  Health Risk Assessment:   Patient rates overall health as good  Patient feels that their physical health rating is same  Eyesight was rated as same  Hearing was rated as same  Patient feels that their emotional and mental health rating is same  Pain experienced in the last 7 days has been none  Patient states that she has experienced no weight loss or gain in last 6 months  Depression Screening:   PHQ-2 Score: 0      Fall Risk Screening:    In the past year, patient has experienced: no history of falling in past year      Urinary Incontinence Screening:   Patient has not leaked urine accidently in the last six months  Home Safety:  Patient does not have trouble with stairs inside or outside of their home  Patient has working smoke alarms and has working carbon monoxide detector  Home safety hazards include: none  Nutrition:   Current diet is Regular  Medications:   Patient is currently taking over-the-counter supplements  OTC medications include: see medication list  Patient is able to manage medications  Activities of Daily Living (ADLs)/Instrumental Activities of Daily Living (IADLs):   Walk and transfer into and out of bed and chair?: Yes  Dress and groom yourself?: Yes    Bathe or shower yourself?: Yes    Feed yourself? Yes  Do your laundry/housekeeping?: Yes  Manage your money, pay your bills and track your expenses?: Yes  Make your own meals?: Yes    Do your own shopping?: Yes    Previous Hospitalizations:   Any hospitalizations or ED visits within the last 12 months?: No      Advance Care Planning:   Living will: Yes    Durable POA for healthcare:  Yes    Advanced directive: Yes    Advanced directive counseling given: Yes    End of Life Decisions reviewed with patient: Yes    Provider agrees with end of life decisions: Yes      Cognitive Screening:   Provider or family/friend/caregiver concerned regarding cognition?: No    PREVENTIVE SCREENINGS      Cardiovascular Screening:    General: Screening Not Indicated and History Lipid Disorder      Diabetes Screening:     General: Screening Not Indicated and History Diabetes      Colorectal Cancer Screening:     General: Risks and Benefits Discussed    Due for: Colonoscopy - Low Risk      Breast Cancer Screening:     General: Screening Current    Due for: Mammogram        Cervical Cancer Screening:    General: Screening Not Indicated      Osteoporosis Screening:    General: Patient Declines      Abdominal Aortic Aneurysm (AAA) Screening:        General: Patient Declines Lung Cancer Screening:     General: Screening Current      Hepatitis C Screening:    General: Screening Current    Other Counseling Topics:   Car/seat belt/driving safety, skin self-exam, sunscreen and calcium and vitamin D intake and regular weightbearing exercise         Jacbo Roach MD

## 2019-10-31 DIAGNOSIS — Z12.31 VISIT FOR SCREENING MAMMOGRAM: ICD-10-CM

## 2019-11-20 DIAGNOSIS — J44.9 CHRONIC OBSTRUCTIVE PULMONARY DISEASE, UNSPECIFIED COPD TYPE (HCC): Chronic | ICD-10-CM

## 2019-11-20 RX ORDER — ALBUTEROL SULFATE 90 UG/1
2 AEROSOL, METERED RESPIRATORY (INHALATION) EVERY 6 HOURS PRN
Qty: 1 INHALER | Refills: 5 | Status: SHIPPED | OUTPATIENT
Start: 2019-11-20 | End: 2020-11-09 | Stop reason: SDUPTHER

## 2019-12-02 ENCOUNTER — OFFICE VISIT (OUTPATIENT)
Dept: PULMONOLOGY | Facility: CLINIC | Age: 69
End: 2019-12-02
Payer: MEDICARE

## 2019-12-02 VITALS
WEIGHT: 150 LBS | BODY MASS INDEX: 30.24 KG/M2 | HEART RATE: 109 BPM | OXYGEN SATURATION: 96 % | DIASTOLIC BLOOD PRESSURE: 69 MMHG | HEIGHT: 59 IN | SYSTOLIC BLOOD PRESSURE: 110 MMHG | TEMPERATURE: 97.8 F

## 2019-12-02 DIAGNOSIS — R91.1 PULMONARY NODULE: Chronic | ICD-10-CM

## 2019-12-02 DIAGNOSIS — Z87.891 PERSONAL HISTORY OF NICOTINE DEPENDENCE: ICD-10-CM

## 2019-12-02 DIAGNOSIS — J44.9 CHRONIC OBSTRUCTIVE PULMONARY DISEASE, UNSPECIFIED COPD TYPE (HCC): Chronic | ICD-10-CM

## 2019-12-02 DIAGNOSIS — F17.201 TOBACCO ABUSE, IN REMISSION: Primary | ICD-10-CM

## 2019-12-02 PROBLEM — R93.89 ABNORMAL CHEST CT: Status: RESOLVED | Noted: 2017-08-29 | Resolved: 2019-12-02

## 2019-12-02 PROCEDURE — 99214 OFFICE O/P EST MOD 30 MIN: CPT | Performed by: INTERNAL MEDICINE

## 2019-12-02 NOTE — PROGRESS NOTES
Progress Note - Pulmonary   Joel Sanabria 78 y o  female MRN: [id number]   Encounter: [id number]      Assessment/Plan:  Patient is a 50-year-old female with past medical history significant tobacco abuse in remission, severe Chronic Obstructive Pulmonary Disease, lung nodule who presents for routine follow-up  The patient reports having good days and bad days which is mostly affected by hot humid weather as well as cold dry weather  Her  reports overall she is doing quite well  For her history of severe Chronic Obstructive Pulmonary Disease, the patient is on Symbicort and Stiolto  Patient is well controlled despite having dual beta agonist   The patient reports significant concerns with cost of the medication  She was offered Wixela through the good Rx program but declines at this time  She enjoys the convenience of the [employer]  If the patient changes her mind, a new prescription may be provided  She was given samples of Symbicort in the office today  For her lung nodule, it was checked on the lung cancer screening CT scan in September  It is unchanged in size therefore she may continue with lung cancer screening CTs with the next 1 due in September of 2020  For tobacco abuse, the patient continues to obtain  She has not had any recent cigarettes  For her prophylaxis, the patient is up-to-date on her annual influenza vaccination  She may follow up in 6 months or sooner as necessary  Plan:  Orders Placed This Encounter   Procedures    CT lung screening program     Standing Status:   Future     Standing Expiration Date:   12/2/2023     Scheduling Instructions: In order for us to bill your insurance or Medicare, the study must be performed at [facility], [facility] or [facility]   Lung screenings performed at any other site are not eligible for insurance coverage and the patient will be responsible for a $49 00 charge at the time of the study  We can only accept credit card or check at the time of service, we are not able to accept cash  If possible wear clothing without any metal in the chest and abdomen area  Sweat suit, shorts, sports bra or bra without underwire may eliminate the need to change  Please arrive 15 minutes prior to your appointment time to register  On the day of the test, please bring any prior CT or MRI studies of this area with you that were not performed at a [facility] facility  To schedule this appointment, please contact Central Scheduling at [phone]  Order Specific Question:   What is the patient's sedation requirement? Answer:   No Sedation     Order Specific Question:   Does patient have a 30 pack year smoking history? (Equivalent to 1 pack of cigarettes per day for a year) IF NO, PATIENT IS NOT ELIGBLE FOR THIS PROGRAM      Answer:   Yes     Order Specific Question:   Has the patient been a current smoker in the last 15 years? IF NO, PATIENT IS NOT ELIGIBLE FOR THIS PROGRAM      Answer:   Yes     Order Specific Question:   Does the patient show any signs of symptoms of lung cancer? IF YES, PLEASE ORDER A DIAGNOSTIC CT CHEST EXAM      Answer:   No     Order Specific Question:   Reason for Exam:     Answer:   history of tobacco abuse     Subjective: The patient reports she has her good days and bad days  Her  reports the high humidity and the cold dry air bother her  She reports having more difficulty with the steps recently  She continues to exercise  She reports her weight is stable  She denies lower extremity swelling  She is up to date on her annual influenza vaccination  The patient has received her annual lung cancer screening CT scan and wishes to continue  Inhaler Regimen:  Symbicort 160/4 5  Stiolto    Remainder of 12 point review of systems negative except as described in HPI        The following portions of the patient's history were reviewed and updated as appropriate: allergies, current medications, past family history, past medical history, past social history, past surgical history and problem list      Objective:   Vitals: Blood pressure 110/69, pulse (!) 109, temperature 97 8 °F (36 6 °C), height 4' 11" (1 499 m), weight 68 kg (150 lb), SpO2 96 % , RA, Body mass index is 30 3 kg/m²  Physical Exam  Gen: Awake, alert, oriented x 3, no acute distress  HEENT: Mucous membranes moist, no oral lesions, no thrush  NECK: No accessory muscle use, JVP not elevated  Cardiac: Regular, single S1, single S2, no murmurs, no rubs, no gallops  Lungs: CTA b/l  Abdomen: normoactive bowel sounds, soft nontender, nondistended, no rebound or rigidity, no guarding  Extremities: no cyanosis, no clubbing, no edema  MSK:  Strength equal in all extremities  Derm:  No rashes/lesions noted  Neuro:  Appropriate mood/affect    Labs: I have personally reviewed pertinent lab results  Lab Results   Component Value Date    WBC 6 4 09/26/2018    WBC 9 57 09/11/2017    HGB 13 5 09/26/2018    HGB 10 8 (L) 09/11/2017     09/26/2018     09/11/2017     Lab Results   Component Value Date    CREATININE 0 82 09/23/2019    CREATININE 0 74 09/11/2017     Imaging and other studies: I have personally reviewed pertinent reports  and I have personally reviewed pertinent films in PACS  9/23/2019:  LUNGS:  Stable emphysema  Stable benign 6 mm right lower lobe nodule 3/81  No new nodule  No endotracheal or endobronchial lesion  PLEURA:  Unremarkable  HEART/GREAT VESSELS:  Unremarkable for patient's age  MEDIASTINUM AND TAYLER:  Unremarkable  Pulmonary Function Testing: I have personally reviewed pertinent reports     and I have personally reviewed pertinent films in PACS  9/27/2017:  FEV1/FVC Ratio: 54 %  Forced Vital Capacity: 1 42 L    57 % predicted  FEV1: 0 77 L     41 % predicted  After administration of bronchodilator FEV1: reduced by 3%  Lung volumes by body plethysmography: Total Lung Capacity 103 % predicted Residual volume 171 % predicted  DLCO corrected for patients hemoglobin level: not able to be performed    Meridith Hals Lyndon Sicard, [place]

## 2020-02-11 DIAGNOSIS — J44.9 CHRONIC OBSTRUCTIVE PULMONARY DISEASE, UNSPECIFIED COPD TYPE (HCC): Chronic | ICD-10-CM

## 2020-02-11 RX ORDER — LEVALBUTEROL INHALATION SOLUTION 1.25 MG/3ML
SOLUTION RESPIRATORY (INHALATION)
Qty: 90 ML | Refills: 6 | Status: SHIPPED | OUTPATIENT
Start: 2020-02-11 | End: 2020-06-17

## 2020-03-16 ENCOUNTER — TELEPHONE (OUTPATIENT)
Dept: PULMONOLOGY | Facility: CLINIC | Age: 70
End: 2020-03-16

## 2020-03-20 DIAGNOSIS — J44.9 CHRONIC OBSTRUCTIVE PULMONARY DISEASE, UNSPECIFIED COPD TYPE (HCC): Chronic | ICD-10-CM

## 2020-03-20 DIAGNOSIS — E78.5 HYPERLIPIDEMIA, UNSPECIFIED HYPERLIPIDEMIA TYPE: Chronic | ICD-10-CM

## 2020-03-20 DIAGNOSIS — E11.9 TYPE 2 DIABETES MELLITUS WITHOUT COMPLICATION, WITHOUT LONG-TERM CURRENT USE OF INSULIN (HCC): Chronic | ICD-10-CM

## 2020-03-20 RX ORDER — BUDESONIDE AND FORMOTEROL FUMARATE DIHYDRATE 160; 4.5 UG/1; UG/1
2 AEROSOL RESPIRATORY (INHALATION) 2 TIMES DAILY
Qty: 30.6 G | Refills: 1 | Status: SHIPPED | OUTPATIENT
Start: 2020-03-20 | End: 2020-04-16

## 2020-03-20 RX ORDER — SIMVASTATIN 20 MG
20 TABLET ORAL
Qty: 90 TABLET | Refills: 0 | Status: SHIPPED | OUTPATIENT
Start: 2020-03-20 | End: 2020-06-26 | Stop reason: SDUPTHER

## 2020-04-16 ENCOUNTER — TELEMEDICINE (OUTPATIENT)
Dept: PULMONOLOGY | Facility: CLINIC | Age: 70
End: 2020-04-16
Payer: MEDICARE

## 2020-04-16 ENCOUNTER — TELEPHONE (OUTPATIENT)
Dept: PULMONOLOGY | Facility: CLINIC | Age: 70
End: 2020-04-16

## 2020-04-16 VITALS — WEIGHT: 148 LBS | HEIGHT: 59 IN | BODY MASS INDEX: 29.84 KG/M2

## 2020-04-16 DIAGNOSIS — J44.1 CHRONIC OBSTRUCTIVE PULMONARY DISEASE WITH ACUTE EXACERBATION (HCC): Primary | Chronic | ICD-10-CM

## 2020-04-16 PROCEDURE — 99442 PR PHYS/QHP TELEPHONE EVALUATION 11-20 MIN: CPT | Performed by: NURSE PRACTITIONER

## 2020-04-16 RX ORDER — AZITHROMYCIN 250 MG/1
TABLET, FILM COATED ORAL
Qty: 6 TABLET | Refills: 0 | Status: SHIPPED | OUTPATIENT
Start: 2020-04-16 | End: 2020-04-20

## 2020-04-16 RX ORDER — GUAIFENESIN 600 MG
1200 TABLET, EXTENDED RELEASE 12 HR ORAL EVERY 12 HOURS SCHEDULED
Qty: 30 TABLET | Refills: 0 | Status: SHIPPED | OUTPATIENT
Start: 2020-04-16

## 2020-05-18 DIAGNOSIS — J44.1 CHRONIC OBSTRUCTIVE PULMONARY DISEASE WITH ACUTE EXACERBATION (HCC): Chronic | ICD-10-CM

## 2020-06-01 ENCOUNTER — TELEPHONE (OUTPATIENT)
Dept: PULMONOLOGY | Facility: CLINIC | Age: 70
End: 2020-06-01

## 2020-06-02 ENCOUNTER — OFFICE VISIT (OUTPATIENT)
Dept: PULMONOLOGY | Facility: CLINIC | Age: 70
End: 2020-06-02
Payer: MEDICARE

## 2020-06-02 VITALS
HEART RATE: 112 BPM | TEMPERATURE: 99.8 F | WEIGHT: 146 LBS | OXYGEN SATURATION: 99 % | SYSTOLIC BLOOD PRESSURE: 121 MMHG | HEIGHT: 60 IN | BODY MASS INDEX: 28.66 KG/M2 | DIASTOLIC BLOOD PRESSURE: 84 MMHG

## 2020-06-02 DIAGNOSIS — J43.2 CENTRILOBULAR EMPHYSEMA (HCC): Chronic | ICD-10-CM

## 2020-06-02 DIAGNOSIS — F17.201 TOBACCO ABUSE, IN REMISSION: Primary | ICD-10-CM

## 2020-06-02 DIAGNOSIS — R91.1 PULMONARY NODULE: Chronic | ICD-10-CM

## 2020-06-02 PROCEDURE — 4040F PNEUMOC VAC/ADMIN/RCVD: CPT | Performed by: INTERNAL MEDICINE

## 2020-06-02 PROCEDURE — 3008F BODY MASS INDEX DOCD: CPT | Performed by: INTERNAL MEDICINE

## 2020-06-02 PROCEDURE — 1160F RVW MEDS BY RX/DR IN RCRD: CPT | Performed by: INTERNAL MEDICINE

## 2020-06-02 PROCEDURE — 1036F TOBACCO NON-USER: CPT | Performed by: INTERNAL MEDICINE

## 2020-06-02 PROCEDURE — 99214 OFFICE O/P EST MOD 30 MIN: CPT | Performed by: INTERNAL MEDICINE

## 2020-06-16 ENCOUNTER — LAB (OUTPATIENT)
Dept: LAB | Facility: CLINIC | Age: 70
End: 2020-06-16
Payer: MEDICARE

## 2020-06-16 DIAGNOSIS — E11.9 TYPE 2 DIABETES MELLITUS WITHOUT COMPLICATION, WITHOUT LONG-TERM CURRENT USE OF INSULIN (HCC): Chronic | ICD-10-CM

## 2020-06-16 DIAGNOSIS — E78.5 HYPERLIPIDEMIA, UNSPECIFIED HYPERLIPIDEMIA TYPE: ICD-10-CM

## 2020-06-16 LAB
ALBUMIN SERPL BCP-MCNC: 3.6 G/DL (ref 3.5–5)
ALP SERPL-CCNC: 79 U/L (ref 46–116)
ALT SERPL W P-5'-P-CCNC: 23 U/L (ref 12–78)
ANION GAP SERPL CALCULATED.3IONS-SCNC: 4 MMOL/L (ref 4–13)
AST SERPL W P-5'-P-CCNC: 18 U/L (ref 5–45)
BILIRUB SERPL-MCNC: 0.51 MG/DL (ref 0.2–1)
BUN SERPL-MCNC: 13 MG/DL (ref 5–25)
CALCIUM SERPL-MCNC: 9.2 MG/DL (ref 8.3–10.1)
CHLORIDE SERPL-SCNC: 106 MMOL/L (ref 100–108)
CHOLEST SERPL-MCNC: 188 MG/DL (ref 50–200)
CO2 SERPL-SCNC: 29 MMOL/L (ref 21–32)
CREAT SERPL-MCNC: 0.78 MG/DL (ref 0.6–1.3)
CREAT UR-MCNC: 128 MG/DL
EST. AVERAGE GLUCOSE BLD GHB EST-MCNC: 131 MG/DL
GFR SERPL CREATININE-BSD FRML MDRD: 77 ML/MIN/1.73SQ M
GLUCOSE P FAST SERPL-MCNC: 109 MG/DL (ref 65–99)
HBA1C MFR BLD: 6.2 %
HDLC SERPL-MCNC: 72 MG/DL
LDLC SERPL CALC-MCNC: 88 MG/DL (ref 0–100)
MICROALBUMIN UR-MCNC: 11 MG/L (ref 0–20)
MICROALBUMIN/CREAT 24H UR: 9 MG/G CREATININE (ref 0–30)
NONHDLC SERPL-MCNC: 116 MG/DL
POTASSIUM SERPL-SCNC: 4.3 MMOL/L (ref 3.5–5.3)
PROT SERPL-MCNC: 7.3 G/DL (ref 6.4–8.2)
SODIUM SERPL-SCNC: 139 MMOL/L (ref 136–145)
TRIGL SERPL-MCNC: 138 MG/DL

## 2020-06-16 PROCEDURE — 82570 ASSAY OF URINE CREATININE: CPT

## 2020-06-16 PROCEDURE — 36415 COLL VENOUS BLD VENIPUNCTURE: CPT

## 2020-06-16 PROCEDURE — 82043 UR ALBUMIN QUANTITATIVE: CPT

## 2020-06-16 PROCEDURE — 80053 COMPREHEN METABOLIC PANEL: CPT

## 2020-06-16 PROCEDURE — 83036 HEMOGLOBIN GLYCOSYLATED A1C: CPT

## 2020-06-16 PROCEDURE — 80061 LIPID PANEL: CPT

## 2020-06-17 DIAGNOSIS — J44.9 CHRONIC OBSTRUCTIVE PULMONARY DISEASE, UNSPECIFIED COPD TYPE (HCC): Chronic | ICD-10-CM

## 2020-06-17 RX ORDER — LEVALBUTEROL INHALATION SOLUTION 1.25 MG/3ML
SOLUTION RESPIRATORY (INHALATION)
Qty: 90 ML | Refills: 6 | Status: SHIPPED | OUTPATIENT
Start: 2020-06-17 | End: 2020-10-29

## 2020-06-26 ENCOUNTER — OFFICE VISIT (OUTPATIENT)
Dept: FAMILY MEDICINE CLINIC | Facility: CLINIC | Age: 70
End: 2020-06-26
Payer: MEDICARE

## 2020-06-26 VITALS
DIASTOLIC BLOOD PRESSURE: 78 MMHG | WEIGHT: 146.2 LBS | BODY MASS INDEX: 28.7 KG/M2 | HEART RATE: 80 BPM | OXYGEN SATURATION: 97 % | RESPIRATION RATE: 16 BRPM | TEMPERATURE: 99.1 F | SYSTOLIC BLOOD PRESSURE: 120 MMHG | HEIGHT: 60 IN

## 2020-06-26 DIAGNOSIS — E78.5 HYPERLIPIDEMIA, UNSPECIFIED HYPERLIPIDEMIA TYPE: Chronic | ICD-10-CM

## 2020-06-26 DIAGNOSIS — Z12.11 SCREEN FOR COLON CANCER: ICD-10-CM

## 2020-06-26 DIAGNOSIS — Z12.31 VISIT FOR SCREENING MAMMOGRAM: ICD-10-CM

## 2020-06-26 DIAGNOSIS — J43.2 CENTRILOBULAR EMPHYSEMA (HCC): Chronic | ICD-10-CM

## 2020-06-26 DIAGNOSIS — E11.9 TYPE 2 DIABETES MELLITUS WITHOUT COMPLICATION, WITHOUT LONG-TERM CURRENT USE OF INSULIN (HCC): Primary | Chronic | ICD-10-CM

## 2020-06-26 PROCEDURE — 3044F HG A1C LEVEL LT 7.0%: CPT | Performed by: FAMILY MEDICINE

## 2020-06-26 PROCEDURE — 4040F PNEUMOC VAC/ADMIN/RCVD: CPT | Performed by: FAMILY MEDICINE

## 2020-06-26 PROCEDURE — 3008F BODY MASS INDEX DOCD: CPT | Performed by: FAMILY MEDICINE

## 2020-06-26 PROCEDURE — 99213 OFFICE O/P EST LOW 20 MIN: CPT | Performed by: FAMILY MEDICINE

## 2020-06-26 PROCEDURE — 1160F RVW MEDS BY RX/DR IN RCRD: CPT | Performed by: FAMILY MEDICINE

## 2020-06-26 PROCEDURE — 1036F TOBACCO NON-USER: CPT | Performed by: FAMILY MEDICINE

## 2020-06-26 RX ORDER — SIMVASTATIN 20 MG
20 TABLET ORAL
Qty: 90 TABLET | Refills: 1 | Status: SHIPPED | OUTPATIENT
Start: 2020-06-26 | End: 2020-09-18 | Stop reason: SDUPTHER

## 2020-07-02 DIAGNOSIS — E11.9 TYPE 2 DIABETES MELLITUS WITHOUT COMPLICATION, WITHOUT LONG-TERM CURRENT USE OF INSULIN (HCC): Chronic | ICD-10-CM

## 2020-09-18 DIAGNOSIS — E11.9 TYPE 2 DIABETES MELLITUS WITHOUT COMPLICATION, WITHOUT LONG-TERM CURRENT USE OF INSULIN (HCC): Chronic | ICD-10-CM

## 2020-09-18 DIAGNOSIS — E78.5 HYPERLIPIDEMIA, UNSPECIFIED HYPERLIPIDEMIA TYPE: Chronic | ICD-10-CM

## 2020-09-18 RX ORDER — SIMVASTATIN 20 MG
20 TABLET ORAL
Qty: 90 TABLET | Refills: 0 | Status: SHIPPED | OUTPATIENT
Start: 2020-09-18 | End: 2020-11-19 | Stop reason: SDUPTHER

## 2020-09-18 NOTE — TELEPHONE ENCOUNTER
Patient is requesting refill on her medications be sent to [facility]  Patient stated that the pharmacy has sent several requests and all have been denied  Patient stated this happens every time she needs a refill  Please send 90 day supply to mail order pharmacy  Call patient when completed      Patient stated she has follow up in November

## 2020-09-23 ENCOUNTER — HOSPITAL ENCOUNTER (OUTPATIENT)
Dept: CT IMAGING | Facility: HOSPITAL | Age: 70
Discharge: HOME | End: 2020-09-23
Attending: INTERNAL MEDICINE
Payer: COMMERCIAL

## 2020-09-23 DIAGNOSIS — F17.201 TOBACCO ABUSE, IN REMISSION: ICD-10-CM

## 2020-09-23 DIAGNOSIS — Z87.891 PERSONAL HISTORY OF NICOTINE DEPENDENCE: ICD-10-CM

## 2020-09-23 PROCEDURE — G0297 LDCT FOR LUNG CA SCREEN: HCPCS

## 2020-10-29 DIAGNOSIS — J44.9 CHRONIC OBSTRUCTIVE PULMONARY DISEASE, UNSPECIFIED COPD TYPE (HCC): Chronic | ICD-10-CM

## 2020-10-29 RX ORDER — LEVALBUTEROL INHALATION SOLUTION 1.25 MG/3ML
SOLUTION RESPIRATORY (INHALATION)
Qty: 90 ML | Refills: 6 | Status: SHIPPED | OUTPATIENT
Start: 2020-10-29 | End: 2021-03-03

## 2020-11-02 ENCOUNTER — TRANSCRIBE ORDERS (OUTPATIENT)
Dept: LAB | Facility: CLINIC | Age: 70
End: 2020-11-02

## 2020-11-02 ENCOUNTER — LAB (OUTPATIENT)
Dept: LAB | Facility: CLINIC | Age: 70
End: 2020-11-02
Payer: MEDICARE

## 2020-11-02 DIAGNOSIS — E11.9 TYPE 2 DIABETES MELLITUS WITHOUT COMPLICATION, WITHOUT LONG-TERM CURRENT USE OF INSULIN (HCC): Chronic | ICD-10-CM

## 2020-11-02 DIAGNOSIS — E78.5 HYPERLIPIDEMIA, UNSPECIFIED HYPERLIPIDEMIA TYPE: Chronic | ICD-10-CM

## 2020-11-02 LAB
ALBUMIN SERPL BCP-MCNC: 3.8 G/DL (ref 3.5–5)
ALP SERPL-CCNC: 85 U/L (ref 46–116)
ALT SERPL W P-5'-P-CCNC: 25 U/L (ref 12–78)
ANION GAP SERPL CALCULATED.3IONS-SCNC: 6 MMOL/L (ref 4–13)
AST SERPL W P-5'-P-CCNC: 14 U/L (ref 5–45)
BILIRUB SERPL-MCNC: 0.49 MG/DL (ref 0.2–1)
BUN SERPL-MCNC: 20 MG/DL (ref 5–25)
CALCIUM SERPL-MCNC: 8.8 MG/DL (ref 8.3–10.1)
CHLORIDE SERPL-SCNC: 106 MMOL/L (ref 100–108)
CHOLEST SERPL-MCNC: 181 MG/DL (ref 50–200)
CO2 SERPL-SCNC: 27 MMOL/L (ref 21–32)
CREAT SERPL-MCNC: 0.79 MG/DL (ref 0.6–1.3)
EST. AVERAGE GLUCOSE BLD GHB EST-MCNC: 134 MG/DL
GFR SERPL CREATININE-BSD FRML MDRD: 76 ML/MIN/1.73SQ M
GLUCOSE P FAST SERPL-MCNC: 118 MG/DL (ref 65–99)
HBA1C MFR BLD: 6.3 %
HDLC SERPL-MCNC: 76 MG/DL
LDLC SERPL CALC-MCNC: 76 MG/DL (ref 0–100)
NONHDLC SERPL-MCNC: 105 MG/DL
POTASSIUM SERPL-SCNC: 3.9 MMOL/L (ref 3.5–5.3)
PROT SERPL-MCNC: 7.3 G/DL (ref 6.4–8.2)
SODIUM SERPL-SCNC: 139 MMOL/L (ref 136–145)
TRIGL SERPL-MCNC: 145 MG/DL

## 2020-11-02 PROCEDURE — 80061 LIPID PANEL: CPT

## 2020-11-02 PROCEDURE — 80053 COMPREHEN METABOLIC PANEL: CPT

## 2020-11-02 PROCEDURE — 83036 HEMOGLOBIN GLYCOSYLATED A1C: CPT

## 2020-11-02 PROCEDURE — 36415 COLL VENOUS BLD VENIPUNCTURE: CPT

## 2020-11-09 ENCOUNTER — OFFICE VISIT (OUTPATIENT)
Dept: PULMONOLOGY | Facility: CLINIC | Age: 70
End: 2020-11-09
Payer: MEDICARE

## 2020-11-09 VITALS
SYSTOLIC BLOOD PRESSURE: 122 MMHG | HEIGHT: 58 IN | BODY MASS INDEX: 31.28 KG/M2 | HEART RATE: 96 BPM | DIASTOLIC BLOOD PRESSURE: 80 MMHG | OXYGEN SATURATION: 99 % | WEIGHT: 149 LBS | TEMPERATURE: 99.1 F | RESPIRATION RATE: 18 BRPM

## 2020-11-09 DIAGNOSIS — J44.9 CHRONIC OBSTRUCTIVE PULMONARY DISEASE, UNSPECIFIED COPD TYPE (HCC): Chronic | ICD-10-CM

## 2020-11-09 DIAGNOSIS — J44.1 CHRONIC OBSTRUCTIVE PULMONARY DISEASE WITH ACUTE EXACERBATION (HCC): Chronic | ICD-10-CM

## 2020-11-09 DIAGNOSIS — R91.1 PULMONARY NODULE: Chronic | ICD-10-CM

## 2020-11-09 DIAGNOSIS — E11.9 TYPE 2 DIABETES MELLITUS WITHOUT COMPLICATION, WITHOUT LONG-TERM CURRENT USE OF INSULIN (HCC): Chronic | ICD-10-CM

## 2020-11-09 DIAGNOSIS — J43.2 CENTRILOBULAR EMPHYSEMA (HCC): Primary | Chronic | ICD-10-CM

## 2020-11-09 DIAGNOSIS — F17.201 TOBACCO ABUSE, IN REMISSION: ICD-10-CM

## 2020-11-09 PROCEDURE — 99214 OFFICE O/P EST MOD 30 MIN: CPT | Performed by: INTERNAL MEDICINE

## 2020-11-09 RX ORDER — AZITHROMYCIN 500 MG/1
500 TABLET, FILM COATED ORAL DAILY
Qty: 3 TABLET | Refills: 0 | Status: SHIPPED | OUTPATIENT
Start: 2020-11-09 | End: 2020-11-12

## 2020-11-09 RX ORDER — ALBUTEROL SULFATE 90 UG/1
2 AEROSOL, METERED RESPIRATORY (INHALATION) EVERY 6 HOURS PRN
Qty: 1 INHALER | Refills: 5 | Status: SHIPPED | OUTPATIENT
Start: 2020-11-09 | End: 2021-12-13

## 2020-11-19 ENCOUNTER — OFFICE VISIT (OUTPATIENT)
Dept: FAMILY MEDICINE CLINIC | Facility: CLINIC | Age: 70
End: 2020-11-19
Payer: MEDICARE

## 2020-11-19 VITALS
BODY MASS INDEX: 29.25 KG/M2 | SYSTOLIC BLOOD PRESSURE: 128 MMHG | RESPIRATION RATE: 18 BRPM | HEIGHT: 60 IN | WEIGHT: 149 LBS | HEART RATE: 94 BPM | DIASTOLIC BLOOD PRESSURE: 78 MMHG | OXYGEN SATURATION: 97 %

## 2020-11-19 DIAGNOSIS — E78.5 HYPERLIPIDEMIA, UNSPECIFIED HYPERLIPIDEMIA TYPE: Chronic | ICD-10-CM

## 2020-11-19 DIAGNOSIS — M62.838 NECK MUSCLE SPASM: Primary | ICD-10-CM

## 2020-11-19 DIAGNOSIS — E11.9 TYPE 2 DIABETES MELLITUS WITHOUT COMPLICATION, WITHOUT LONG-TERM CURRENT USE OF INSULIN (HCC): Chronic | ICD-10-CM

## 2020-11-19 PROCEDURE — 99214 OFFICE O/P EST MOD 30 MIN: CPT | Performed by: FAMILY MEDICINE

## 2020-11-19 PROCEDURE — 1123F ACP DISCUSS/DSCN MKR DOCD: CPT | Performed by: FAMILY MEDICINE

## 2020-11-19 PROCEDURE — G0439 PPPS, SUBSEQ VISIT: HCPCS | Performed by: FAMILY MEDICINE

## 2020-11-19 RX ORDER — CYCLOBENZAPRINE HCL 5 MG
5 TABLET ORAL
Qty: 15 TABLET | Refills: 0 | Status: SHIPPED | OUTPATIENT
Start: 2020-11-19 | End: 2020-11-19 | Stop reason: SDUPTHER

## 2020-11-19 RX ORDER — SIMVASTATIN 20 MG
20 TABLET ORAL
Qty: 90 TABLET | Refills: 1 | Status: SHIPPED | OUTPATIENT
Start: 2020-11-19 | End: 2021-05-20 | Stop reason: SDUPTHER

## 2020-11-19 RX ORDER — CYCLOBENZAPRINE HCL 5 MG
5 TABLET ORAL
Qty: 15 TABLET | Refills: 0 | Status: SHIPPED | OUTPATIENT
Start: 2020-11-19

## 2021-02-16 ENCOUNTER — HOSPITAL ENCOUNTER (OUTPATIENT)
Dept: MAMMOGRAPHY | Facility: HOSPITAL | Age: 71
Discharge: HOME | End: 2021-02-16
Payer: MEDICARE

## 2021-02-16 VITALS — BODY MASS INDEX: 29.25 KG/M2 | WEIGHT: 149 LBS | HEIGHT: 60 IN

## 2021-02-16 DIAGNOSIS — Z12.31 VISIT FOR SCREENING MAMMOGRAM: ICD-10-CM

## 2021-02-16 PROCEDURE — 77067 SCR MAMMO BI INCL CAD: CPT

## 2021-02-16 PROCEDURE — 77063 BREAST TOMOSYNTHESIS BI: CPT

## 2021-02-17 ENCOUNTER — TELEPHONE (OUTPATIENT)
Dept: FAMILY MEDICINE CLINIC | Facility: CLINIC | Age: 71
End: 2021-02-17

## 2021-02-17 NOTE — TELEPHONE ENCOUNTER
----- Message from Lucian Rosario MD sent at 2/17/2021 12:42 PM EST -----  Please call patient with normal results

## 2021-03-03 DIAGNOSIS — J44.9 CHRONIC OBSTRUCTIVE PULMONARY DISEASE, UNSPECIFIED COPD TYPE (HCC): Chronic | ICD-10-CM

## 2021-03-03 RX ORDER — LEVALBUTEROL INHALATION SOLUTION 1.25 MG/3ML
SOLUTION RESPIRATORY (INHALATION)
Qty: 90 ML | Refills: 6 | Status: SHIPPED | OUTPATIENT
Start: 2021-03-03 | End: 2021-07-12

## 2021-03-17 ENCOUNTER — TELEPHONE (OUTPATIENT)
Dept: PULMONOLOGY | Facility: CLINIC | Age: 71
End: 2021-03-17

## 2021-03-17 DIAGNOSIS — J45.909 UNCOMPLICATED ASTHMA, UNSPECIFIED ASTHMA SEVERITY, UNSPECIFIED WHETHER PERSISTENT: Primary | ICD-10-CM

## 2021-03-17 NOTE — TELEPHONE ENCOUNTER
Attempted to call Alyx Gupta to see if she is allergic to prednisone  LM to call back  If she calls back, please ask her

## 2021-03-17 NOTE — TELEPHONE ENCOUNTER
Patient calling stating she would like a Z pack sent in  She states she has sob, mucus, aches and pains "the whole nine"  She states she gets like this once every few months  She would like it sent to [facility] if we do send in anything   Please advise [phone]

## 2021-03-17 NOTE — TELEPHONE ENCOUNTER
Spoke with Ami Rivera  She said she has been sick for a couple day now  She is coughing up clear mucous  She is SOB on exertion  She is wheezing and had chills, body aches and HA's occasionally  Denies chest tightness and fevers  She said she gets this every 3-4 months  She has oxygen to use as needed  She does not know her liter flow and she says she uses it when resting not ambulating even though that is when she has her issues  I advised her she needs to use it on ambulation  She said her pulse ox has been in the 90's  She is using her nebulizer and taking Claritin and Mucinex  She is using her rescue inhaler more than usual   She had both of her covid vaccines  She has not been around anyone sick

## 2021-03-17 NOTE — TELEPHONE ENCOUNTER
Called patient  She is refusing to come in  She said "I dont feel like traveling all the way up there"  She said she doesn't understand the difference, she already said what was wrong

## 2021-03-18 RX ORDER — AZITHROMYCIN 250 MG/1
TABLET, FILM COATED ORAL
Qty: 6 TABLET | Refills: 0 | Status: SHIPPED | OUTPATIENT
Start: 2021-03-18 | End: 2021-03-22

## 2021-03-18 NOTE — TELEPHONE ENCOUNTER
She does not know if she had a Medrol pack before  She wants a Zpack  Please call her if anymore questions

## 2021-05-11 ENCOUNTER — TRANSCRIBE ORDERS (OUTPATIENT)
Dept: LAB | Facility: CLINIC | Age: 71
End: 2021-05-11

## 2021-05-11 ENCOUNTER — APPOINTMENT (OUTPATIENT)
Dept: LAB | Facility: CLINIC | Age: 71
End: 2021-05-11
Payer: MEDICARE

## 2021-05-11 DIAGNOSIS — E11.9 TYPE 2 DIABETES MELLITUS WITHOUT COMPLICATION, WITHOUT LONG-TERM CURRENT USE OF INSULIN (HCC): Chronic | ICD-10-CM

## 2021-05-11 DIAGNOSIS — E78.5 HYPERLIPIDEMIA, UNSPECIFIED HYPERLIPIDEMIA TYPE: Chronic | ICD-10-CM

## 2021-05-11 LAB
ALBUMIN SERPL BCP-MCNC: 3.6 G/DL (ref 3.5–5)
ALP SERPL-CCNC: 90 U/L (ref 46–116)
ALT SERPL W P-5'-P-CCNC: 23 U/L (ref 12–78)
ANION GAP SERPL CALCULATED.3IONS-SCNC: 5 MMOL/L (ref 4–13)
AST SERPL W P-5'-P-CCNC: 16 U/L (ref 5–45)
BILIRUB SERPL-MCNC: 0.42 MG/DL (ref 0.2–1)
BUN SERPL-MCNC: 17 MG/DL (ref 5–25)
CALCIUM SERPL-MCNC: 9.3 MG/DL (ref 8.3–10.1)
CHLORIDE SERPL-SCNC: 105 MMOL/L (ref 100–108)
CHOLEST SERPL-MCNC: 176 MG/DL (ref 50–200)
CO2 SERPL-SCNC: 29 MMOL/L (ref 21–32)
CREAT SERPL-MCNC: 0.73 MG/DL (ref 0.6–1.3)
CREAT UR-MCNC: 174 MG/DL
EST. AVERAGE GLUCOSE BLD GHB EST-MCNC: 137 MG/DL
GFR SERPL CREATININE-BSD FRML MDRD: 83 ML/MIN/1.73SQ M
GLUCOSE P FAST SERPL-MCNC: 104 MG/DL (ref 65–99)
HBA1C MFR BLD: 6.4 %
HDLC SERPL-MCNC: 74 MG/DL
LDLC SERPL CALC-MCNC: 75 MG/DL (ref 0–100)
MICROALBUMIN UR-MCNC: 17.6 MG/L (ref 0–20)
MICROALBUMIN/CREAT 24H UR: 10 MG/G CREATININE (ref 0–30)
NONHDLC SERPL-MCNC: 102 MG/DL
POTASSIUM SERPL-SCNC: 4.1 MMOL/L (ref 3.5–5.3)
PROT SERPL-MCNC: 7.5 G/DL (ref 6.4–8.2)
SODIUM SERPL-SCNC: 139 MMOL/L (ref 136–145)
TRIGL SERPL-MCNC: 136 MG/DL

## 2021-05-11 PROCEDURE — 82570 ASSAY OF URINE CREATININE: CPT

## 2021-05-11 PROCEDURE — 80061 LIPID PANEL: CPT

## 2021-05-11 PROCEDURE — 82043 UR ALBUMIN QUANTITATIVE: CPT

## 2021-05-11 PROCEDURE — 80053 COMPREHEN METABOLIC PANEL: CPT

## 2021-05-11 PROCEDURE — 36415 COLL VENOUS BLD VENIPUNCTURE: CPT

## 2021-05-11 PROCEDURE — 83036 HEMOGLOBIN GLYCOSYLATED A1C: CPT

## 2021-05-20 ENCOUNTER — OFFICE VISIT (OUTPATIENT)
Dept: FAMILY MEDICINE CLINIC | Facility: CLINIC | Age: 71
End: 2021-05-20
Payer: MEDICARE

## 2021-05-20 VITALS
OXYGEN SATURATION: 95 % | WEIGHT: 150 LBS | RESPIRATION RATE: 18 BRPM | HEIGHT: 58 IN | HEART RATE: 98 BPM | DIASTOLIC BLOOD PRESSURE: 78 MMHG | SYSTOLIC BLOOD PRESSURE: 130 MMHG | BODY MASS INDEX: 31.49 KG/M2

## 2021-05-20 DIAGNOSIS — Z12.31 VISIT FOR SCREENING MAMMOGRAM: Primary | ICD-10-CM

## 2021-05-20 DIAGNOSIS — E11.9 TYPE 2 DIABETES MELLITUS WITHOUT COMPLICATION, WITHOUT LONG-TERM CURRENT USE OF INSULIN (HCC): Chronic | ICD-10-CM

## 2021-05-20 DIAGNOSIS — E78.5 HYPERLIPIDEMIA, UNSPECIFIED HYPERLIPIDEMIA TYPE: Chronic | ICD-10-CM

## 2021-05-20 DIAGNOSIS — J43.2 CENTRILOBULAR EMPHYSEMA (HCC): ICD-10-CM

## 2021-05-20 PROBLEM — M62.838 NECK MUSCLE SPASM: Status: RESOLVED | Noted: 2020-11-19 | Resolved: 2021-05-20

## 2021-05-20 PROCEDURE — 99214 OFFICE O/P EST MOD 30 MIN: CPT | Performed by: FAMILY MEDICINE

## 2021-05-20 RX ORDER — FEXOFENADINE HCL 180 MG/1
180 TABLET ORAL DAILY
COMMUNITY

## 2021-05-20 RX ORDER — SIMVASTATIN 20 MG
20 TABLET ORAL
Qty: 90 TABLET | Refills: 1 | Status: SHIPPED | OUTPATIENT
Start: 2021-05-20 | End: 2021-11-03 | Stop reason: SDUPTHER

## 2021-05-20 NOTE — ASSESSMENT & PLAN NOTE
Lab Results   Component Value Date    HGBA1C 6 4 (H) 05/11/2021     A1c stable  Continue metformin 500 milligram daily    Continue monitoring with metabolic labs at 6 month interval

## 2021-05-20 NOTE — PROGRESS NOTES
Subjective:      Patient ID: Hannah Elder is a 69 y o  female  Diabetes  She presents for her follow-up diabetic visit  She has type 2 diabetes mellitus  Her disease course has been stable (A1c 6 4)  There are no hypoglycemic associated symptoms  Pertinent negatives for diabetes include no polydipsia, no polyphagia and no polyuria  There are no hypoglycemic complications  Symptoms are stable  There are no diabetic complications  Risk factors for coronary artery disease include diabetes mellitus and dyslipidemia  Current diabetic treatments: Metformin 500 milligram  She is compliant with treatment all of the time  She is following a diabetic diet  Meal planning includes avoidance of concentrated sweets  Eye exam is current  Hyperlipidemia  This is a chronic problem  The current episode started more than 1 year ago  The problem is controlled  Recent lipid tests were reviewed and are normal  Current antihyperlipidemic treatment includes statins  The current treatment provides significant improvement of lipids  There are no compliance problems  Risk factors for coronary artery disease include dyslipidemia, diabetes mellitus and post-menopausal        Past Medical History:   Diagnosis Date    [facility] [address] [facility]     [facility]     Hyperlipidemia     Pulmonary nodule 8/31/2017       Family History   Problem Relation Age of Onset    COPD Sister     Diabetes Sister     Substance Abuse Father     Substance Abuse Family     Liver disease Mother     No Known Problems Daughter     No Known Problems Daughter     No Known Problems Son     No Known Problems Sister     No Known Problems Half-Brother        Past Surgical History:   Procedure Laterality Date    HAND SURGERY      TENDON TRANSFER      Left lower arm        reports that she quit smoking about 4 years ago  Her smoking use included cigarettes  She started smoking about 51 years ago   She has a 47 00 pack-year smoking history  She has never used smokeless tobacco  She reports that she does not drink alcohol or use drugs  Current Outpatient Medications:     albuterol (PROVENTIL HFA,VENTOLIN HFA) 90 mcg/act inhaler, Inhale 2 puffs every 6 (six) hours as needed for wheezing, Disp: 1 Inhaler, Rfl: 5    Calcium 500-100 MG-UNIT CHEW, Chew 1 tablet daily, Disp: , Rfl:     fexofenadine (Allegra Allergy) 180 MG tablet, Take 180 mg by mouth daily, Disp: , Rfl:     fluticasone-umeclidinium-vilanterol (TRELEGY) 100-62 5-25 MCG/INH inhaler, Inhale 1 puff daily Rinse mouth after use , Disp: 1 Inhaler, Rfl: 5    levalbuterol (XOPENEX) 1 25 mg/3 mL nebulizer solution, TAKE 1 VIAL BY NEBULIZATION EVERY 8 (EIGHT) HOURS AS NEEDED FOR WHEEZING OR SHORTNESS OF BREATH, Disp: 90 mL, Rfl: 6    metFORMIN (GLUCOPHAGE) 500 mg tablet, Take 1 tablet (500 mg total) by mouth daily with breakfast, Disp: 90 tablet, Rfl: 1    simvastatin (ZOCOR) 20 mg tablet, Take 1 tablet (20 mg total) by mouth daily at bedtime, Disp: 90 tablet, Rfl: 1    cyclobenzaprine (FLEXERIL) 5 mg tablet, Take 1 tablet (5 mg total) by mouth daily at bedtime, Disp: 15 tablet, Rfl: 0    guaiFENesin (MUCINEX) 600 mg 12 hr tablet, Take 2 tablets (1,200 mg total) by mouth every 12 (twelve) hours (Patient not taking: Reported on 11/19/2020), Disp: 30 tablet, Rfl: 0    The following portions of the patient's history were reviewed and updated as appropriate: allergies, current medications, past family history, past medical history, past social history, past surgical history and problem list     Review of Systems   Constitutional: Negative  HENT: Negative  Eyes: Negative  Respiratory: Negative  Cardiovascular: Negative  Gastrointestinal: Negative  Endocrine: Negative  Negative for polydipsia, polyphagia and polyuria  Genitourinary: Negative  Musculoskeletal: Negative  Skin: Negative  Neurological: Negative      Psychiatric/Behavioral: Negative  Objective:    /78   Pulse 98   Resp 18   Ht 4' 9 5" (1 461 m)   Wt 68 kg (150 lb)   SpO2 95%   BMI 31 90 kg/m²      Physical Exam  Constitutional:       Appearance: She is well-developed  Eyes:      Pupils: Pupils are equal, round, and reactive to light  Neck:      Musculoskeletal: Normal range of motion  Cardiovascular:      Rate and Rhythm: Normal rate and regular rhythm  Pulses: no weak pulses     Heart sounds: Normal heart sounds  Pulmonary:      Effort: Pulmonary effort is normal       Breath sounds: Normal breath sounds  Abdominal:      General: Bowel sounds are normal       Palpations: Abdomen is soft  Musculoskeletal: Normal range of motion  Feet:      Right foot:      Skin integrity: No ulcer, skin breakdown, erythema, warmth, callus or dry skin  Left foot:      Skin integrity: No ulcer, skin breakdown, erythema, warmth, callus or dry skin  Neurological:      Mental Status: She is alert and oriented to person, place, and time  Psychiatric:         Behavior: Behavior normal          Judgment: Judgment normal        Patient's shoes and socks removed  Right Foot/Ankle   Right Foot Inspection  Skin Exam: skin normal and skin intact no dry skin, no warmth, no callus, no erythema, no maceration, no abnormal color, no pre-ulcer, no ulcer and no callus                          Toe Exam: ROM and strength within normal limits  Sensory   Vibration: intact  Proprioception: intact   Monofilament testing: intact  Vascular  Capillary refills: < 3 seconds      Left Foot/Ankle  Left Foot Inspection  Skin Exam: skin normal and skin intactno dry skin, no warmth, no erythema, no maceration, normal color, no pre-ulcer, no ulcer and no callus                         Toe Exam: ROM and strength within normal limits                   Sensory   Vibration: intact  Proprioception: intact  Monofilament: intact  Vascular  Capillary refills: < 3 seconds    Assign Risk Category:  No deformity present; No loss of protective sensation; No weak pulses       Risk: 0        Recent Results (from the past 1008 hour(s))   Comprehensive metabolic panel    Collection Time: 05/11/21 11:41 AM   Result Value Ref Range    Sodium 139 136 - 145 mmol/L    Potassium 4 1 3 5 - 5 3 mmol/L    Chloride 105 100 - 108 mmol/L    CO2 29 21 - 32 mmol/L    ANION GAP 5 4 - 13 mmol/L    BUN 17 5 - 25 mg/dL    Creatinine 0 73 0 60 - 1 30 mg/dL    Glucose, Fasting 104 (H) 65 - 99 mg/dL    Calcium 9 3 8 3 - 10 1 mg/dL    AST 16 5 - 45 U/L    ALT 23 12 - 78 U/L    Alkaline Phosphatase 90 46 - 116 U/L    Total Protein 7 5 6 4 - 8 2 g/dL    Albumin 3 6 3 5 - 5 0 g/dL    Total Bilirubin 0 42 0 20 - 1 00 mg/dL    eGFR 83 ml/min/1 73sq m   Hemoglobin A1C    Collection Time: 05/11/21 11:41 AM   Result Value Ref Range    Hemoglobin A1C 6 4 (H) Normal 3 8-5 6%; PreDiabetic 5 7-6 4%; Diabetic >=6 5%; Glycemic control for adults with diabetes <7 0% %     mg/dl   Lipid panel    Collection Time: 05/11/21 11:41 AM   Result Value Ref Range    Cholesterol 176 50 - 200 mg/dL    Triglycerides 136 <=150 mg/dL    HDL, Direct 74 >=40 mg/dL    LDL Calculated 75 0 - 100 mg/dL    Non-HDL-Chol (CHOL-HDL) 102 mg/dl   Microalbumin / creatinine urine ratio    Collection Time: 05/11/21 11:41 AM   Result Value Ref Range    Creatinine, Ur 174 0 mg/dL    Microalbum  ,U,Random 17 6 0 0 - 20 0 mg/L    Microalb Creat Ratio 10 0 - 30 mg/g creatinine       Assessment/Plan:         Problem List Items Addressed This Visit        Endocrine    Type 2 diabetes mellitus without complication, without long-term current use of insulin (HCC) (Chronic)       Lab Results   Component Value Date    HGBA1C 6 4 (H) 05/11/2021     A1c stable  Continue metformin 500 milligram daily    Continue monitoring with metabolic labs at 6 month interval          Relevant Medications    metFORMIN (GLUCOPHAGE) 500 mg tablet    Other Relevant Orders    Diabetic foot exam Comprehensive metabolic panel    Hemoglobin A1C       Respiratory    COPD (chronic obstructive pulmonary disease) (HCC) (Chronic)     Breathing at baseline  Continue medications/monitoring per Pulmonary  Relevant Medications    fexofenadine (Allegra Allergy) 180 MG tablet       Other    HLD (hyperlipidemia) (Chronic)     LDL is at goal   Continue simvastatin 20 milligram daily  Relevant Medications    simvastatin (ZOCOR) 20 mg tablet    Other Relevant Orders    Lipid panel      Other Visit Diagnoses     Visit for screening mammogram    -  Primary    Relevant Orders    Mammo screening bilateral w 3d & cad          BMI Counseling: Body mass index is 31 9 kg/m²  The BMI is above normal  Nutrition recommendations include reducing portion sizes, decreasing overall calorie intake, 3-5 servings of fruits/vegetables daily, reducing fast food intake and consuming healthier snacks  Exercise recommendations include moderate aerobic physical activity for 150 minutes/week

## 2021-05-26 ENCOUNTER — OFFICE VISIT (OUTPATIENT)
Dept: PULMONOLOGY | Facility: CLINIC | Age: 71
End: 2021-05-26
Payer: MEDICARE

## 2021-05-26 VITALS
OXYGEN SATURATION: 95 % | BODY MASS INDEX: 32.36 KG/M2 | HEIGHT: 57 IN | TEMPERATURE: 97.5 F | HEART RATE: 110 BPM | RESPIRATION RATE: 18 BRPM | DIASTOLIC BLOOD PRESSURE: 76 MMHG | WEIGHT: 150 LBS | SYSTOLIC BLOOD PRESSURE: 132 MMHG

## 2021-05-26 DIAGNOSIS — J43.2 CENTRILOBULAR EMPHYSEMA (HCC): Primary | Chronic | ICD-10-CM

## 2021-05-26 DIAGNOSIS — F17.201 TOBACCO ABUSE, IN REMISSION: ICD-10-CM

## 2021-05-26 DIAGNOSIS — Z12.11 SCREEN FOR COLON CANCER: ICD-10-CM

## 2021-05-26 DIAGNOSIS — J96.11 CHRONIC HYPOXEMIC RESPIRATORY FAILURE (HCC): ICD-10-CM

## 2021-05-26 PROCEDURE — 99214 OFFICE O/P EST MOD 30 MIN: CPT | Performed by: INTERNAL MEDICINE

## 2021-05-26 PROCEDURE — 94618 PULMONARY STRESS TESTING: CPT | Performed by: INTERNAL MEDICINE

## 2021-05-26 RX ORDER — AZITHROMYCIN 250 MG/1
TABLET, FILM COATED ORAL
Qty: 6 TABLET | Refills: 0 | Status: SHIPPED | OUTPATIENT
Start: 2021-05-26 | End: 2021-05-30

## 2021-05-26 NOTE — PROGRESS NOTES
Progress Note - Pulmonary   Chantelle Acuna 69 y o  female MRN: [id number]   Encounter: [id number]      Assessment/Plan:    Patient is a 48-year-old female with past medical history significant for tobacco abuse, severe Chronic Obstructive Pulmonary Disease who presents for routine follow-up  The patient reports she has overall been doing very well but does have and seasonal episodes of difficulty breathing  During these episodes, she is typically given a Z-Jonny which significantly helped her respiratory status  It may be more related to allergies but given her significant intolerance steroids, Z-Jonny provides a benefit both an antibiotic and anti-inflammatory component  The patient has been prescribed a Z-Jonny for use in the fall  She may use this as needed  For her severe Chronic Obstructive Pulmonary Disease, the patient does have significant functional limitations in terms of her breathing  She is interested in being evaluated for the [place] valve  Therefore will check a pulmonary function testing to see if she qualifies  If she does, patient will need CT scan was ever protocol  Severe COPD  -  Check PFTs for consideration of EBV therapy  -  If PFTs qualify; will order CT chest with zephyr protocol  -  Continue Trelegy; PRN xopenex    Tracheobronchitis  -  Pt reports episodes of bronchitis in the spring/fall;  Possibly allergy related  -  Given intolerance to steroids, pt improves with z-pack  -  Provided prescription for z-pack to be used at onset of symptoms  -  May trial OTC allergy medications as well    Chronic hypoxemic respiratory failure  -  Currently using 2L w/ exertion and occasionally to sleep  -  Encouraged to use supplemental oxygen more frequently with exertion    Dyspnea  -  Related to COPD  -  Will consider pulm rehab referral    Tobacco abuse  -  Quit 2017  -  Continue lung cancer screening - next due 2017    Patient may follow up in 3 months or sooner as necessary  Orders:  Orders Placed This Encounter   Procedures    POCT 6 minute walk    Complete PFT with post bronchodilator     Standing Status:   Future     Standing Expiration Date:   5/26/2022     Order Specific Question:   Would you like to add MVV, MIP, MEP into this order? Answer:   No       Subjective: The patient had an episode of difficulty breathing in March  She typically has a spring/fall cold  She has started taking Allegra which helps  She has noted a significant degree of mucus  Which      Her condition has improved with z-pack  The patient had her vaccination in March and had a sore arm  Her  notes significant limitations in terms of her walking  She is using her oxygen with exertion  Inhaler Regimen:  Trelegy  Xopenex - 2-3x/day    Remainder of review of systems negative except as described in HPI  The following portions of the patient's history were reviewed and updated as appropriate: allergies, current medications, past family history, past medical history, past social history, past surgical history and problem list      Objective:   Vitals: Blood pressure 132/76, pulse (!) 110, temperature 97 5 °F (36 4 °C), temperature source Tympanic, resp  rate 18, height 4' 9" (1 448 m), weight 68 kg (150 lb), SpO2 95 % , RA, Body mass index is 32 46 kg/m²  Physical Exam  Gen: Pleasant, awake, alert, oriented x 3, no acute distress  HEENT: Mucous membranes moist, no oral lesions, no thrush  NECK: No accessory muscle use, JVP not elevated  Cardiac: RRR, single S1, single S2, no murmurs, no rubs, no gallops  Lungs: CTA b/l; no w/r/c  Abdomen: normoactive bowel sounds, soft nontender, nondistended, no rebound or rigidity, no guarding; obese  Extremities: no cyanosis, no clubbing, no LE edema  MSK:  Strength equal in all extremities  Derm:  No rashes/lesions noted  Neuro:  Appropriate mood/affect    Labs: I have personally reviewed pertinent lab results    Lab Results Component Value Date    WBC 6 4 09/26/2018    WBC 9 57 09/11/2017    HGB 13 5 09/26/2018    HGB 10 8 (L) 09/11/2017     09/26/2018     09/11/2017     Lab Results   Component Value Date    CREATININE 0 73 05/11/2021        Imaging and other studies: I have personally reviewed pertinent reports  and I have personally reviewed pertinent films in PACS  CT Chest 9/23/20  My interpretation:  Stable lung nodules    Radiology findings:  LUNGS:  Stable 5 mm noncalcified nodule right lower lobe image 74 series 3 unchanged as far back as September 2017  Few additional punctate noncalcified in the left lower lobe measuring 2 mm each image 81 and image 82 series 3 in retrospect unchanged since September 2018   2 mm noncalcified nodule right upper lobe image 13 series 3 in retrospect unchanged since September 2017  No new suspicious pulmonary nodules  Pulmonary emphysema, stable  No infiltrate  Central airways are clear  PLEURA:  Unremarkable  HEART/GREAT VESSELS:  Heart is not enlarged  No pericardial effusion  Aortic and coronary artery calcification  MEDIASTINUM AND TAYLER:  Stable small sliding hiatal hernia  Otherwise unremarkable  Pulmonary Function Testing: I have personally reviewed pertinent reports  and I have personally reviewed pertinent films in PACS  9/27/2017:  Severe obstruction  FEV1/FVC Ratio: 54 %  Forced Vital Capacity: 1 42 L    57 % predicted  FEV1: 0 77 L     41 % predicted  After administration of bronchodilator FEV1: reduced by 3%     Lung volumes by body plethysmography: Total Lung Capacity 103 % predicted Residual volume 171 % predicted     DLCO corrected for patients hemoglobin level: not able to be performed    Fredrica Lynch Claudeen Ran, [place]

## 2021-05-27 PROBLEM — R06.02 SOB (SHORTNESS OF BREATH): Status: RESOLVED | Noted: 2017-08-28 | Resolved: 2021-05-27

## 2021-07-02 ENCOUNTER — HOSPITAL ENCOUNTER (OUTPATIENT)
Dept: PULMONOLOGY | Facility: HOSPITAL | Age: 71
Discharge: HOME | End: 2021-07-02
Attending: INTERNAL MEDICINE
Payer: MEDICARE

## 2021-07-02 DIAGNOSIS — J43.2 CENTRILOBULAR EMPHYSEMA (HCC): ICD-10-CM

## 2021-07-02 PROCEDURE — 94726 PLETHYSMOGRAPHY LUNG VOLUMES: CPT

## 2021-07-02 PROCEDURE — 94726 PLETHYSMOGRAPHY LUNG VOLUMES: CPT | Performed by: INTERNAL MEDICINE

## 2021-07-02 PROCEDURE — 94729 DIFFUSING CAPACITY: CPT | Performed by: INTERNAL MEDICINE

## 2021-07-02 PROCEDURE — 94060 EVALUATION OF WHEEZING: CPT

## 2021-07-02 PROCEDURE — 94060 EVALUATION OF WHEEZING: CPT | Performed by: INTERNAL MEDICINE

## 2021-07-02 PROCEDURE — 94729 DIFFUSING CAPACITY: CPT

## 2021-07-02 PROCEDURE — 94760 N-INVAS EAR/PLS OXIMETRY 1: CPT

## 2021-07-02 RX ORDER — ALBUTEROL SULFATE 2.5 MG/3ML
2.5 SOLUTION RESPIRATORY (INHALATION) ONCE
Status: COMPLETED | OUTPATIENT
Start: 2021-07-02 | End: 2021-07-02

## 2021-07-02 RX ADMIN — ALBUTEROL SULFATE 2.5 MG: 2.5 SOLUTION RESPIRATORY (INHALATION) at 14:07

## 2021-07-07 DIAGNOSIS — J44.9 CHRONIC OBSTRUCTIVE PULMONARY DISEASE, UNSPECIFIED COPD TYPE (HCC): Chronic | ICD-10-CM

## 2021-07-12 ENCOUNTER — TELEPHONE (OUTPATIENT)
Dept: OTHER | Facility: HOSPITAL | Age: 71
End: 2021-07-12

## 2021-07-12 RX ORDER — LEVALBUTEROL INHALATION SOLUTION 1.25 MG/3ML
SOLUTION RESPIRATORY (INHALATION)
Qty: 180 ML | Refills: 3 | Status: SHIPPED | OUTPATIENT
Start: 2021-07-12 | End: 2021-12-22

## 2021-07-12 NOTE — TELEPHONE ENCOUNTER
----- Message from Jessie Avalos MD sent at 7/8/2021  7:10 AM EDT -----  I have reviewed the results of your pulmonary function testing and they are similar to the results from 2017  Please follow up as previously scheduled

## 2021-07-12 NOTE — TELEPHONE ENCOUNTER
Left voicemail PFTs have remained relatively unchanged since 2017 will follow-up as previously scheduled any questions please call office

## 2021-08-09 DIAGNOSIS — J44.1 CHRONIC OBSTRUCTIVE PULMONARY DISEASE WITH ACUTE EXACERBATION (HCC): Chronic | ICD-10-CM

## 2021-08-31 ENCOUNTER — OFFICE VISIT (OUTPATIENT)
Dept: PULMONOLOGY | Facility: CLINIC | Age: 71
End: 2021-08-31
Payer: MEDICARE

## 2021-08-31 VITALS
BODY MASS INDEX: 32.28 KG/M2 | RESPIRATION RATE: 16 BRPM | OXYGEN SATURATION: 98 % | TEMPERATURE: 98.1 F | HEIGHT: 58 IN | DIASTOLIC BLOOD PRESSURE: 72 MMHG | SYSTOLIC BLOOD PRESSURE: 126 MMHG | HEART RATE: 85 BPM | WEIGHT: 153.8 LBS

## 2021-08-31 DIAGNOSIS — J43.2 CENTRILOBULAR EMPHYSEMA (HCC): Primary | Chronic | ICD-10-CM

## 2021-08-31 DIAGNOSIS — R06.00 DYSPNEA, UNSPECIFIED TYPE: ICD-10-CM

## 2021-08-31 DIAGNOSIS — R91.1 LUNG NODULE: ICD-10-CM

## 2021-08-31 PROCEDURE — 99214 OFFICE O/P EST MOD 30 MIN: CPT | Performed by: INTERNAL MEDICINE

## 2021-08-31 NOTE — ASSESSMENT & PLAN NOTE
COPD  · Continue albuterol as needed for rescue   · Continue daily Xopenex nebulizer BID  · Continue Trelegy   · Discuss with patient utility of oxygen when

## 2021-08-31 NOTE — PROGRESS NOTES
Assessment/PLAN  Patient is a 69year old female with a past medical history significant for COPD, Tobacco abuse (50 pack years) in remission, and dyspnea presenting for follow-up  She is compliant with her medications, but reports using her oxygen sparingly  She is still experiencing dyspnea when she walks and reports that she becomes out of breath when walking  The patient was prescribed Z-Jonny for her seasonal symptoms but report not having used it yet, but will start once she begins to exhibit symptoms  She was attempting to see if she was a candidate for Reyno Valve, however her PFT in 07/2021, showing RV of 110, and TLC of 83  COPD (chronic obstructive pulmonary disease) (HCC)  COPD  · Continue albuterol as needed for rescue   · Continue daily Xopenex nebulizer BID  · Continue Trelegy   · Discuss with patient utility of oxygen when    Dyspnea  Dyspnea  · Associated with COPD and previous Tobacco abuse   · Consider using oxygen daily      Tobacco abuse, in remission  Tobacco Abuse  · Patient has quit smoking cigarettes since 2017  · Continue abstaining      Lung nodule  Lung Nodule  · LDCT 09/2020 showed 5 mm nodule in the right lower lobe  · Repeat LDCT 09/2021      Subjective:  Patient ID: Kiara Chaudhary is a 69 y o  female  Patient today reports that she is here for her follow-up  Patient reports that she is still has shortness of breath with walking  She says that even when she walks to the end of her driveway she becomes short of breath  She doesn't use her oxygen at all because she's afraid of becoming dependent on it  She does report that she is compliant on her medications, and that mucinex helps her with mucous production, albeit she does report that she still has mucous with her coughs  She reports that she has her z-pack if she needs, but hasn't used any currently       Review of Systems   Constitutional: Negative for appetite change, chills, diaphoresis, fatigue, fever and unexpected weight change  HENT: Negative for congestion, rhinorrhea and sinus pain  Eyes: Negative for visual disturbance  Respiratory: Positive for cough, shortness of breath and wheezing  Negative for choking and chest tightness  Cardiovascular: Negative  Gastrointestinal: Negative  Genitourinary: Negative  Musculoskeletal: Negative  Skin: Negative  Neurological: Negative  Hematological: Negative  Psychiatric/Behavioral: Negative  Objective:  Physical Exam  Constitutional:       General: She is not in acute distress  Appearance: Normal appearance  She is obese  She is not ill-appearing, toxic-appearing or diaphoretic  HENT:      Head: Normocephalic and atraumatic  Right Ear: External ear normal       Left Ear: External ear normal       Nose: Nose normal  No congestion  Mouth/Throat:      Mouth: Mucous membranes are moist    Eyes:      Extraocular Movements: Extraocular movements intact  Conjunctiva/sclera: Conjunctivae normal    Cardiovascular:      Rate and Rhythm: Normal rate and regular rhythm  Pulses: Normal pulses  Heart sounds: Normal heart sounds  No murmur heard  Pulmonary:      Effort: Pulmonary effort is normal  No respiratory distress  Breath sounds: Wheezing present  No rhonchi or rales  Chest:      Chest wall: No tenderness  Abdominal:      General: Bowel sounds are normal       Palpations: Abdomen is soft  Tenderness: There is no abdominal tenderness  Musculoskeletal:      Right lower leg: No edema  Left lower leg: No edema  Skin:     General: Skin is warm and dry  Findings: No rash  Neurological:      General: No focal deficit present  Mental Status: She is alert     Psychiatric:         Mood and Affect: Mood normal          Behavior: Behavior normal

## 2021-10-07 ENCOUNTER — TELEPHONE (OUTPATIENT)
Dept: PULMONOLOGY | Facility: CLINIC | Age: 71
End: 2021-10-07

## 2021-10-07 DIAGNOSIS — F17.201 TOBACCO ABUSE, IN REMISSION: Primary | ICD-10-CM

## 2021-10-07 DIAGNOSIS — Z87.891 PERSONAL HISTORY OF NICOTINE DEPENDENCE: ICD-10-CM

## 2021-10-25 ENCOUNTER — APPOINTMENT (OUTPATIENT)
Dept: LAB | Facility: CLINIC | Age: 71
End: 2021-10-25
Payer: MEDICARE

## 2021-10-25 DIAGNOSIS — E11.9 TYPE 2 DIABETES MELLITUS WITHOUT COMPLICATION, WITHOUT LONG-TERM CURRENT USE OF INSULIN (HCC): Chronic | ICD-10-CM

## 2021-10-25 DIAGNOSIS — E78.5 HYPERLIPIDEMIA, UNSPECIFIED HYPERLIPIDEMIA TYPE: Chronic | ICD-10-CM

## 2021-10-25 LAB
ALBUMIN SERPL BCP-MCNC: 3.6 G/DL (ref 3.5–5)
ALP SERPL-CCNC: 96 U/L (ref 46–116)
ALT SERPL W P-5'-P-CCNC: 23 U/L (ref 12–78)
ANION GAP SERPL CALCULATED.3IONS-SCNC: 3 MMOL/L (ref 4–13)
AST SERPL W P-5'-P-CCNC: 14 U/L (ref 5–45)
BILIRUB SERPL-MCNC: 0.5 MG/DL (ref 0.2–1)
BUN SERPL-MCNC: 20 MG/DL (ref 5–25)
CALCIUM SERPL-MCNC: 9.8 MG/DL (ref 8.3–10.1)
CHLORIDE SERPL-SCNC: 104 MMOL/L (ref 100–108)
CHOLEST SERPL-MCNC: 189 MG/DL (ref 50–200)
CO2 SERPL-SCNC: 28 MMOL/L (ref 21–32)
CREAT SERPL-MCNC: 0.84 MG/DL (ref 0.6–1.3)
EST. AVERAGE GLUCOSE BLD GHB EST-MCNC: 137 MG/DL
GFR SERPL CREATININE-BSD FRML MDRD: 70 ML/MIN/1.73SQ M
GLUCOSE P FAST SERPL-MCNC: 137 MG/DL (ref 65–99)
HBA1C MFR BLD: 6.4 %
HDLC SERPL-MCNC: 70 MG/DL
LDLC SERPL CALC-MCNC: 87 MG/DL (ref 0–100)
NONHDLC SERPL-MCNC: 119 MG/DL
POTASSIUM SERPL-SCNC: 4 MMOL/L (ref 3.5–5.3)
PROT SERPL-MCNC: 7.7 G/DL (ref 6.4–8.2)
SODIUM SERPL-SCNC: 135 MMOL/L (ref 136–145)
TRIGL SERPL-MCNC: 159 MG/DL

## 2021-10-25 PROCEDURE — 80061 LIPID PANEL: CPT

## 2021-10-25 PROCEDURE — 83036 HEMOGLOBIN GLYCOSYLATED A1C: CPT

## 2021-10-25 PROCEDURE — 80053 COMPREHEN METABOLIC PANEL: CPT

## 2021-10-25 PROCEDURE — 36415 COLL VENOUS BLD VENIPUNCTURE: CPT

## 2021-11-03 ENCOUNTER — OFFICE VISIT (OUTPATIENT)
Dept: FAMILY MEDICINE CLINIC | Facility: CLINIC | Age: 71
End: 2021-11-03
Payer: MEDICARE

## 2021-11-03 VITALS
RESPIRATION RATE: 16 BRPM | HEIGHT: 57 IN | SYSTOLIC BLOOD PRESSURE: 124 MMHG | BODY MASS INDEX: 33.61 KG/M2 | HEART RATE: 100 BPM | DIASTOLIC BLOOD PRESSURE: 80 MMHG | OXYGEN SATURATION: 97 % | WEIGHT: 155.8 LBS

## 2021-11-03 DIAGNOSIS — E11.9 TYPE 2 DIABETES MELLITUS WITHOUT COMPLICATION, WITHOUT LONG-TERM CURRENT USE OF INSULIN (HCC): Chronic | ICD-10-CM

## 2021-11-03 DIAGNOSIS — J43.2 CENTRILOBULAR EMPHYSEMA (HCC): Primary | Chronic | ICD-10-CM

## 2021-11-03 DIAGNOSIS — E78.5 HYPERLIPIDEMIA, UNSPECIFIED HYPERLIPIDEMIA TYPE: Chronic | ICD-10-CM

## 2021-11-03 DIAGNOSIS — Z00.00 MEDICARE ANNUAL WELLNESS VISIT, SUBSEQUENT: ICD-10-CM

## 2021-11-03 PROCEDURE — G0439 PPPS, SUBSEQ VISIT: HCPCS | Performed by: FAMILY MEDICINE

## 2021-11-03 PROCEDURE — 99214 OFFICE O/P EST MOD 30 MIN: CPT | Performed by: FAMILY MEDICINE

## 2021-11-03 RX ORDER — SIMVASTATIN 20 MG
20 TABLET ORAL
Qty: 90 TABLET | Refills: 1 | Status: SHIPPED | OUTPATIENT
Start: 2021-11-03 | End: 2022-05-04 | Stop reason: SDUPTHER

## 2021-11-30 ENCOUNTER — TELEPHONE (OUTPATIENT)
Dept: FAMILY MEDICINE CLINIC | Facility: CLINIC | Age: 71
End: 2021-11-30

## 2021-11-30 NOTE — TELEPHONE ENCOUNTER
Julio Oconnell called from the [address] [place] [address] office that Teagan Elliott was referred to and they have reached out several times to her but she has not returned the call    She was to be scheduled for a diabetic eye exam   LIANE

## 2021-12-07 ENCOUNTER — HOSPITAL ENCOUNTER (OUTPATIENT)
Dept: CT IMAGING | Facility: HOSPITAL | Age: 71
Discharge: HOME | End: 2021-12-07
Attending: INTERNAL MEDICINE
Payer: MEDICARE

## 2021-12-07 DIAGNOSIS — F17.201 TOBACCO ABUSE, IN REMISSION: ICD-10-CM

## 2021-12-07 DIAGNOSIS — Z87.891 PERSONAL HISTORY OF NICOTINE DEPENDENCE: ICD-10-CM

## 2021-12-07 PROCEDURE — 71271 CT THORAX LUNG CANCER SCR C-: CPT

## 2021-12-12 DIAGNOSIS — J44.9 CHRONIC OBSTRUCTIVE PULMONARY DISEASE, UNSPECIFIED COPD TYPE (HCC): Chronic | ICD-10-CM

## 2021-12-13 RX ORDER — ALBUTEROL SULFATE 90 UG/1
AEROSOL, METERED RESPIRATORY (INHALATION)
Qty: 18 G | Refills: 5 | Status: SHIPPED | OUTPATIENT
Start: 2021-12-13 | End: 2022-02-15 | Stop reason: SDUPTHER

## 2021-12-22 DIAGNOSIS — J44.9 CHRONIC OBSTRUCTIVE PULMONARY DISEASE, UNSPECIFIED COPD TYPE (HCC): Chronic | ICD-10-CM

## 2021-12-22 RX ORDER — LEVALBUTEROL INHALATION SOLUTION 1.25 MG/3ML
SOLUTION RESPIRATORY (INHALATION)
Qty: 180 ML | Refills: 3 | Status: SHIPPED | OUTPATIENT
Start: 2021-12-22

## 2021-12-23 NOTE — ASSESSMENT & PLAN NOTE
Tobacco Abuse  · Patient has quit smoking cigarettes since 2017  · Continue abstaining 4 = No assist / stand by assistance

## 2022-02-15 ENCOUNTER — OFFICE VISIT (OUTPATIENT)
Dept: PULMONOLOGY | Facility: CLINIC | Age: 72
End: 2022-02-15
Payer: MEDICARE

## 2022-02-15 VITALS
SYSTOLIC BLOOD PRESSURE: 116 MMHG | HEART RATE: 111 BPM | TEMPERATURE: 99 F | OXYGEN SATURATION: 90 % | RESPIRATION RATE: 16 BRPM | DIASTOLIC BLOOD PRESSURE: 68 MMHG | WEIGHT: 155 LBS | HEIGHT: 57 IN | BODY MASS INDEX: 33.44 KG/M2

## 2022-02-15 DIAGNOSIS — J44.9 CHRONIC OBSTRUCTIVE PULMONARY DISEASE, UNSPECIFIED COPD TYPE (HCC): Chronic | ICD-10-CM

## 2022-02-15 DIAGNOSIS — J43.2 CENTRILOBULAR EMPHYSEMA (HCC): Primary | ICD-10-CM

## 2022-02-15 DIAGNOSIS — J44.1 CHRONIC OBSTRUCTIVE PULMONARY DISEASE WITH ACUTE EXACERBATION (HCC): Chronic | ICD-10-CM

## 2022-02-15 PROCEDURE — 99214 OFFICE O/P EST MOD 30 MIN: CPT | Performed by: INTERNAL MEDICINE

## 2022-02-15 RX ORDER — METHYLPREDNISOLONE 4 MG/1
TABLET ORAL
Qty: 1 TABLET | Refills: 0 | Status: SHIPPED | OUTPATIENT
Start: 2022-02-15

## 2022-02-15 RX ORDER — ALBUTEROL SULFATE 90 UG/1
1 AEROSOL, METERED RESPIRATORY (INHALATION) EVERY 4 HOURS PRN
Qty: 18 G | Refills: 5 | Status: SHIPPED | OUTPATIENT
Start: 2022-02-15

## 2022-02-15 NOTE — PROGRESS NOTES
Progress Note - Pulmonary   Elray Lesch 66 y o  female MRN: [id number]   Encounter: [id number]      Assessment/Plan:  Patient is a 67-year-old female with past medical history significant for history of tobacco use, severe Chronic Obstructive Pulmonary Disease who presents for routine follow-up  The patient notes her breathing has been more difficult over the past several weeks  The patient has concerns about previous responses to prednisone  Will try Medrol Dosepak at this time  Severe COPD  -  does not qualify for EBV at this time given low RV  -  continue Trelegy; albuterol p r n   -  will try Medrol Dosepak given likely exacerbation of her COPD    Tracheobronchitis - no exacerbation  -  May trial OTC allergy medications as well     Chronic hypoxemic respiratory failure  -  Currently using 2L w/ exertion and occasionally to sleep  -  Encouraged to use supplemental oxygen more frequently with exertion     Dyspnea  -  Related to COPD  -  encouraged increased exercise     Tobacco abuse  -  Quit 2017  -  Continue lung cancer screening - next due 2017    Patient may follow up in 6 months or sooner as necessary  Orders:  -  Medrol Dosepak ordered  -  refill Trelegy and albuterol    Subjective: The patient notes shortness of breath which has been present for several weeks  She has no significant benefit from xopenex  She is using her nebulizer about x1 day  Denies fevers, chills, nausea or vomiting  She had a previous rash related to prednisone  She had no difficulties with breathing  She will occasionally her oxygen  She is not using her oxygen at night  Her difficulty is with exertion  Inhaler Regimen:  Trelegy  Xopenex    Remainder of review of systems negative except as described in HPI        The following portions of the patient's history were reviewed and updated as appropriate: allergies, current medications, past family history, past medical history, past social history, past surgical history and problem list      Objective:   Vitals: Blood pressure 116/68, pulse (!) 111, temperature 99 °F (37 2 °C), temperature source Tympanic, resp  rate 16, height 4' 9" (1 448 m), weight 70 3 kg (155 lb), SpO2 90 % , RA, Body mass index is 33 54 kg/m²  Physical Exam  Gen: Pleasant, awake, alert, oriented x 3, no acute distress  HEENT: Mucous membranes moist, no oral lesions, no thrush  NECK: No accessory muscle use, JVP not elevated  Cardiac: RRR, single S1, single S2, no murmurs, no rubs, no gallops  Lungs: CTA b/l  Abdomen: normoactive bowel sounds, soft nontender, nondistended, no rebound or rigidity, no guarding  Extremities: no cyanosis, no clubbing, no Le edema  MSK:  Strength equal in all extremities  Derm:  No rashes/lesions noted  Neuro:  Appropriate mood/affect    Labs: I have personally reviewed pertinent lab results  Lab Results   Component Value Date    WBC 6 4 09/26/2018    WBC 9 57 09/11/2017    HGB 13 5 09/26/2018    HGB 10 8 (L) 09/11/2017     09/26/2018     09/11/2017     Lab Results   Component Value Date    CREATININE 0 84 10/25/2021        Imaging and other studies: I have personally reviewed pertinent reports  CT lung screening 12/7/21  Radiology findings:  LUNGS:  Central airways are patent  Mild to moderate emphysema  Lung nodules as follows (series 3):  2 mm right upper lobe nodule on image #14, stable  3 mm right middle lobe nodule on image #64, stable  6 mm right lower lobe nodule on image #79, stable  PLEURA:  Unremarkable  HEART/GREAT VESSELS: Heart is unremarkable for patient's age  No thoracic aortic aneurysm  MEDIASTINUM AND TAYLER:  Unremarkable  CHEST WALL AND LOWER NECK:   Unremarkable  Pulmonary Function Testing: I have personally reviewed pertinent reports     and I have personally reviewed pertinent films in PACS  7/2/21:  Severe obstruction with very severe diffusion defect  FEV1/FVC Ratio: 49 %  Forced Vital Capacity: 1 65 L    70 % predicted  FEV1: 0 80 L     40 % predicted     Lung volumes by body plethysmography:   Total Lung Capacity 83 % predicted   Residual volume 110 % predicted     DLCO corrected for patients hemoglobin level: 25 %    Ramone Chadwick

## 2022-02-17 ENCOUNTER — HOSPITAL ENCOUNTER (OUTPATIENT)
Dept: MAMMOGRAPHY | Facility: HOSPITAL | Age: 72
Discharge: HOME | End: 2022-02-17
Payer: MEDICARE

## 2022-02-17 ENCOUNTER — TELEPHONE (OUTPATIENT)
Dept: PULMONOLOGY | Facility: CLINIC | Age: 72
End: 2022-02-17

## 2022-02-17 VITALS — HEIGHT: 57 IN | BODY MASS INDEX: 33.44 KG/M2 | WEIGHT: 154.98 LBS

## 2022-02-17 DIAGNOSIS — Z12.31 VISIT FOR SCREENING MAMMOGRAM: ICD-10-CM

## 2022-02-17 PROCEDURE — 77063 BREAST TOMOSYNTHESIS BI: CPT

## 2022-02-17 PROCEDURE — 77067 SCR MAMMO BI INCL CAD: CPT

## 2022-02-21 ENCOUNTER — TELEPHONE (OUTPATIENT)
Dept: FAMILY MEDICINE CLINIC | Facility: CLINIC | Age: 72
End: 2022-02-21

## 2022-02-21 NOTE — TELEPHONE ENCOUNTER
Spoke with patient she trialed Medrol pack as well and had significant side effects including face swelling shortness of breath and tachycardia- will place prednisone and Medrol pack as patient has allergy    -  she does report overall she does feel at her respiratory baseline which is short of breath upon exertion- encouraged her to continue with her nebulizer minimum t i d     -  patient will follow-up with any further acute respiratory symptoms

## 2022-02-21 NOTE — TELEPHONE ENCOUNTER
----- Message from Carlton Avelar MD sent at 2/21/2022 12:46 PM EST -----  Please call patient with normal results

## 2022-04-27 ENCOUNTER — APPOINTMENT (OUTPATIENT)
Dept: LAB | Facility: CLINIC | Age: 72
End: 2022-04-27
Payer: MEDICARE

## 2022-04-27 DIAGNOSIS — E11.9 TYPE 2 DIABETES MELLITUS WITHOUT COMPLICATION, WITHOUT LONG-TERM CURRENT USE OF INSULIN (HCC): Chronic | ICD-10-CM

## 2022-04-27 DIAGNOSIS — E78.5 HYPERLIPIDEMIA, UNSPECIFIED HYPERLIPIDEMIA TYPE: Chronic | ICD-10-CM

## 2022-04-27 LAB
ALBUMIN SERPL BCP-MCNC: 3.8 G/DL (ref 3.5–5)
ALP SERPL-CCNC: 85 U/L (ref 46–116)
ALT SERPL W P-5'-P-CCNC: 29 U/L (ref 12–78)
ANION GAP SERPL CALCULATED.3IONS-SCNC: 6 MMOL/L (ref 4–13)
AST SERPL W P-5'-P-CCNC: 21 U/L (ref 5–45)
BILIRUB SERPL-MCNC: 0.61 MG/DL (ref 0.2–1)
BUN SERPL-MCNC: 15 MG/DL (ref 5–25)
CALCIUM SERPL-MCNC: 9.5 MG/DL (ref 8.3–10.1)
CHLORIDE SERPL-SCNC: 105 MMOL/L (ref 100–108)
CHOLEST SERPL-MCNC: 174 MG/DL
CO2 SERPL-SCNC: 27 MMOL/L (ref 21–32)
CREAT SERPL-MCNC: 0.87 MG/DL (ref 0.6–1.3)
EST. AVERAGE GLUCOSE BLD GHB EST-MCNC: 137 MG/DL
GFR SERPL CREATININE-BSD FRML MDRD: 66 ML/MIN/1.73SQ M
GLUCOSE P FAST SERPL-MCNC: 116 MG/DL (ref 65–99)
HBA1C MFR BLD: 6.4 %
HDLC SERPL-MCNC: 68 MG/DL
LDLC SERPL CALC-MCNC: 71 MG/DL (ref 0–100)
NONHDLC SERPL-MCNC: 106 MG/DL
POTASSIUM SERPL-SCNC: 4.2 MMOL/L (ref 3.5–5.3)
PROT SERPL-MCNC: 7.6 G/DL (ref 6.4–8.2)
SODIUM SERPL-SCNC: 138 MMOL/L (ref 136–145)
TRIGL SERPL-MCNC: 173 MG/DL

## 2022-04-27 PROCEDURE — 80061 LIPID PANEL: CPT

## 2022-04-27 PROCEDURE — 83036 HEMOGLOBIN GLYCOSYLATED A1C: CPT

## 2022-04-27 PROCEDURE — 36415 COLL VENOUS BLD VENIPUNCTURE: CPT

## 2022-04-27 PROCEDURE — 80053 COMPREHEN METABOLIC PANEL: CPT

## 2022-04-28 ENCOUNTER — RA CDI HCC (OUTPATIENT)
Dept: OTHER | Facility: HOSPITAL | Age: 72
End: 2022-04-28

## 2022-04-28 NOTE — PROGRESS NOTES
[address]  coding opportunities       Chart reviewed, no opportunity found: CHART REVIEWED, NO OPPORTUNITY FOUND        Patients Insurance     Medicare Insurance: Medicare

## 2022-05-04 ENCOUNTER — OFFICE VISIT (OUTPATIENT)
Dept: FAMILY MEDICINE CLINIC | Facility: CLINIC | Age: 72
End: 2022-05-04
Payer: MEDICARE

## 2022-05-04 VITALS
HEART RATE: 107 BPM | HEIGHT: 57 IN | SYSTOLIC BLOOD PRESSURE: 122 MMHG | OXYGEN SATURATION: 94 % | BODY MASS INDEX: 33.87 KG/M2 | WEIGHT: 157 LBS | DIASTOLIC BLOOD PRESSURE: 72 MMHG

## 2022-05-04 DIAGNOSIS — J96.11 CHRONIC HYPOXEMIC RESPIRATORY FAILURE (HCC): Primary | ICD-10-CM

## 2022-05-04 DIAGNOSIS — E78.5 HYPERLIPIDEMIA, UNSPECIFIED HYPERLIPIDEMIA TYPE: Chronic | ICD-10-CM

## 2022-05-04 DIAGNOSIS — J43.2 CENTRILOBULAR EMPHYSEMA (HCC): Chronic | ICD-10-CM

## 2022-05-04 DIAGNOSIS — E11.9 TYPE 2 DIABETES MELLITUS WITHOUT COMPLICATION, WITHOUT LONG-TERM CURRENT USE OF INSULIN (HCC): Chronic | ICD-10-CM

## 2022-05-04 PROCEDURE — 99214 OFFICE O/P EST MOD 30 MIN: CPT | Performed by: FAMILY MEDICINE

## 2022-05-04 RX ORDER — SIMVASTATIN 20 MG
20 TABLET ORAL
Qty: 90 TABLET | Refills: 1 | Status: SHIPPED | OUTPATIENT
Start: 2022-05-04

## 2022-05-04 NOTE — PROGRESS NOTES
Subjective:      Patient ID: Jenny Vasquez is a 66 y o  female  Diabetes  She presents for her follow-up diabetic visit  She has type 2 diabetes mellitus  Her disease course has been stable (A1c 6 4)  There are no hypoglycemic associated symptoms  Pertinent negatives for diabetes include no chest pain, no polydipsia and no polyuria  Symptoms are stable  There are no diabetic complications  Risk factors for coronary artery disease include diabetes mellitus, dyslipidemia and post-menopausal  Current diabetic treatments: Metformin 500 milligram daily  She is compliant with treatment all of the time  Meal planning includes avoidance of concentrated sweets  Hyperlipidemia  This is a chronic problem  The current episode started more than 1 year ago  The problem is controlled  Recent lipid tests were reviewed and are normal  Pertinent negatives include no chest pain, myalgias or shortness of breath  Current antihyperlipidemic treatment includes statins  The current treatment provides significant improvement of lipids  Past Medical History:   Diagnosis Date    [facility]     [facility]     Hyperlipidemia     Pulmonary nodule 8/31/2017       Family History   Problem Relation Age of Onset    COPD Sister     Diabetes Sister     Substance Abuse Father     Liver disease Mother     No Known Problems Daughter     No Known Problems Daughter     No Known Problems Son     No Known Problems Sister     No Known Problems Half-Brother        Past Surgical History:   Procedure Laterality Date    HAND SURGERY      TENDON TRANSFER      Left lower arm        reports that she quit smoking about 5 years ago  Her smoking use included cigarettes  She started smoking about 52 years ago  She has a 47 00 pack-year smoking history  She has never used smokeless tobacco  She reports that she does not drink alcohol and does not use drugs        Current Outpatient Medications:     albuterol (PROVENTIL HFA,VENTOLIN HFA) 90 mcg/act inhaler, Inhale 1 puff every 4 (four) hours as needed for wheezing, Disp: 18 g, Rfl: 5    Calcium 500-100 MG-UNIT CHEW, Chew 1 tablet daily, Disp: , Rfl:     cyclobenzaprine (FLEXERIL) 5 mg tablet, Take 1 tablet (5 mg total) by mouth daily at bedtime, Disp: 15 tablet, Rfl: 0    fexofenadine (Allegra Allergy) 180 MG tablet, Take 180 mg by mouth daily, Disp: , Rfl:     fluticasone-umeclidinium-vilanterol (TRELEGY) 100-62 5-25 MCG/INH inhaler, Inhale 1 puff daily Rinse mouth after use , Disp: 1 each, Rfl: 5    guaiFENesin (MUCINEX) 600 mg 12 hr tablet, Take 2 tablets (1,200 mg total) by mouth every 12 (twelve) hours, Disp: 30 tablet, Rfl: 0    levalbuterol (XOPENEX) 1 25 mg/3 mL nebulizer solution, TAKE 1 VIAL BY NEBULIZATION EVERY 8 (EIGHT) HOURS AS NEEDED FOR WHEEZING OR SHORTNESS OF BREATH, Disp: 180 mL, Rfl: 3    metFORMIN (GLUCOPHAGE) 500 mg tablet, Take 1 tablet (500 mg total) by mouth daily with breakfast, Disp: 90 tablet, Rfl: 1    methylPREDNISolone 4 MG tablet therapy pack, Use as directed on package, Disp: 1 tablet, Rfl: 0    simvastatin (ZOCOR) 20 mg tablet, Take 1 tablet (20 mg total) by mouth daily at bedtime, Disp: 90 tablet, Rfl: 1    The following portions of the patient's history were reviewed and updated as appropriate: allergies, current medications, past family history, past medical history, past social history, past surgical history and problem list     Review of Systems   Constitutional: Negative  Respiratory: Negative  Negative for shortness of breath  Cardiovascular: Negative  Negative for chest pain and palpitations  Endocrine: Negative for polydipsia and polyuria  Musculoskeletal: Negative for myalgias  Neurological: Negative  Psychiatric/Behavioral: Negative              Objective:    /72   Pulse (!) 107   Ht 4' 9" (1 448 m)   Wt 71 2 kg (157 lb)   SpO2 94%   BMI 33 97 kg/m²      Physical Exam  Constitutional:       Appearance: She is well-developed  Cardiovascular:      Rate and Rhythm: Normal rate and regular rhythm  Pulses: no weak pulses     Heart sounds: Normal heart sounds  Pulmonary:      Effort: Pulmonary effort is normal       Breath sounds: Normal breath sounds  Abdominal:      General: Bowel sounds are normal       Palpations: Abdomen is soft  Feet:      Right foot:      Skin integrity: No ulcer, skin breakdown, erythema, warmth, callus or dry skin  Left foot:      Skin integrity: No ulcer, skin breakdown, erythema, warmth, callus or dry skin  Neurological:      Mental Status: She is alert and oriented to person, place, and time  Psychiatric:         Behavior: Behavior normal          Judgment: Judgment normal          Patient's shoes and socks removed  Right Foot/Ankle   Right Foot Inspection  Skin Exam: skin normal and skin intact  No dry skin, no warmth, no callus, no erythema, no maceration, no abnormal color, no pre-ulcer, no ulcer and no callus  Toe Exam: ROM and strength within normal limits  Sensory   Vibration: intact  Proprioception: intact  Monofilament testing: intact    Vascular  Capillary refills: < 3 seconds          Left Foot/Ankle  Left Foot Inspection  Skin Exam: skin normal and skin intact  No dry skin, no warmth, no erythema, no maceration, normal color, no pre-ulcer, no ulcer and no callus  Toe Exam: ROM and strength within normal limits       Sensory   Vibration: intact  Proprioception: intact  Monofilament testing: intact    Vascular  Capillary refills: < 3 seconds        Assign Risk Category  No deformity present  No loss of protective sensation  No weak pulses  Risk: 0    Recent Results (from the past 1008 hour(s))   Comprehensive metabolic panel    Collection Time: 04/27/22 11:22 AM   Result Value Ref Range    Sodium 138 136 - 145 mmol/L    Potassium 4 2 3 5 - 5 3 mmol/L    Chloride 105 100 - 108 mmol/L    CO2 27 21 - 32 mmol/L ANION GAP 6 4 - 13 mmol/L    BUN 15 5 - 25 mg/dL    Creatinine 0 87 0 60 - 1 30 mg/dL    Glucose, Fasting 116 (H) 65 - 99 mg/dL    Calcium 9 5 8 3 - 10 1 mg/dL    AST 21 5 - 45 U/L    ALT 29 12 - 78 U/L    Alkaline Phosphatase 85 46 - 116 U/L    Total Protein 7 6 6 4 - 8 2 g/dL    Albumin 3 8 3 5 - 5 0 g/dL    Total Bilirubin 0 61 0 20 - 1 00 mg/dL    eGFR 66 ml/min/1 73sq m   Lipid panel    Collection Time: 04/27/22 11:22 AM   Result Value Ref Range    Cholesterol 174 See Comment mg/dL    Triglycerides 173 (H) See Comment mg/dL    HDL, Direct 68 >=50 mg/dL    LDL Calculated 71 0 - 100 mg/dL    Non-HDL-Chol (CHOL-HDL) 106 mg/dl   Hemoglobin A1C    Collection Time: 04/27/22 11:22 AM   Result Value Ref Range    Hemoglobin A1C 6 4 (H) Normal 3 8-5 6%; PreDiabetic 5 7-6 4%; Diabetic >=6 5%; Glycemic control for adults with diabetes <7 0% %     mg/dl       Assessment/Plan:         Problem List Items Addressed This Visit        Endocrine    Type 2 diabetes mellitus without complication, without long-term current use of insulin (HCC) (Chronic)       Lab Results   Component Value Date    HGBA1C 6 4 (H) 04/27/2022   A1c stable  Continue metformin 500 milligram daily  Continue monitoring  Renal function stable  Patient does not have Eye insurance unable to go for eye exam           Relevant Medications    metFORMIN (GLUCOPHAGE) 500 mg tablet    Other Relevant Orders    Diabetic foot exam    Comprehensive metabolic panel    Hemoglobin A1C    Microalbumin / creatinine urine ratio       Respiratory    COPD (chronic obstructive pulmonary disease) (HCC) (Chronic)     Stable  Continue trelegy per Pulmonary  RESOLVED: Chronic hypoxemic respiratory failure (HCC) - Primary       Other    HLD (hyperlipidemia) (Chronic)     LDL is at goal   Continue simvastatin 20 milligram daily  If triglycerides are borderline high  Patient advised to continue with low-fat diet  Continue monitoring           Relevant Medications    simvastatin (ZOCOR) 20 mg tablet    Other Relevant Orders    Lipid panel

## 2022-05-04 NOTE — ASSESSMENT & PLAN NOTE
LDL is at goal   Continue simvastatin 20 milligram daily  If triglycerides are borderline high  Patient advised to continue with low-fat diet  Continue monitoring

## 2022-05-04 NOTE — ASSESSMENT & PLAN NOTE
Lab Results   Component Value Date    HGBA1C 6 4 (H) 04/27/2022   A1c stable  Continue metformin 500 milligram daily  Continue monitoring  Renal function stable    Patient does not have Eye insurance unable to go for eye exam

## 2022-08-22 ENCOUNTER — OFFICE VISIT (OUTPATIENT)
Dept: PULMONOLOGY | Facility: CLINIC | Age: 72
End: 2022-08-22
Payer: MEDICARE

## 2022-08-22 VITALS
BODY MASS INDEX: 33.66 KG/M2 | OXYGEN SATURATION: 92 % | HEART RATE: 126 BPM | SYSTOLIC BLOOD PRESSURE: 118 MMHG | HEIGHT: 57 IN | TEMPERATURE: 98.3 F | WEIGHT: 156 LBS | DIASTOLIC BLOOD PRESSURE: 70 MMHG | RESPIRATION RATE: 18 BRPM

## 2022-08-22 DIAGNOSIS — J96.11 CHRONIC HYPOXEMIC RESPIRATORY FAILURE (HCC): Primary | ICD-10-CM

## 2022-08-22 DIAGNOSIS — R00.0 CHRONIC TACHYCARDIA: ICD-10-CM

## 2022-08-22 DIAGNOSIS — F17.201 TOBACCO ABUSE, IN REMISSION: ICD-10-CM

## 2022-08-22 DIAGNOSIS — R91.1 LUNG NODULE: ICD-10-CM

## 2022-08-22 PROCEDURE — 94618 PULMONARY STRESS TESTING: CPT | Performed by: INTERNAL MEDICINE

## 2022-08-22 PROCEDURE — 99214 OFFICE O/P EST MOD 30 MIN: CPT | Performed by: INTERNAL MEDICINE

## 2022-08-22 NOTE — PROGRESS NOTES
Progress Note - Pulmonary   Hannah Elder 66 y o  female MRN: [id number]   Encounter: [id number]      Assessment/Plan:  Patient is a 69-year-old female with past medical history significant tobacco abuse in remission, severe Chronic Obstructive Pulmonary Disease, chronic hypoxemic respiratory failure who presents for routine follow-up  Patient reports overall she is stable  She does report significant difficulty with exertion but this is approximately stable  Recommend patient to increase exercise over the next 12 weeks, if she does not do this, would recommend pulmonary rehab  Tachycardia  -  patient tachycardia noted on oxygen titration study  -  but will consider checking EKG at next visit    Severe COPD  -  does not qualify for EBV at this time given low RV  -  continue Trelegy; albuterol p r n    -  spacer provided; encourage use with albuterol HFA     Chronic hypoxemic respiratory failure  -  Currently using 2L w/ exertion and occasionally to sleep  -  Encouraged to use supplemental oxygen more frequently with exertion  -  checked oxygen titration study, patient qualifies for 2 L nasal cannula with exertion     Dyspnea  -  Related to COPD  -  encouraged increased exercise  -  may consider workup for tachycardia if persistent dyspnea     Tobacco abuse  -  Quit 2017  -  Continue lung cancer screening - next due 12/2022     1  Chronic hypoxemic respiratory failure (HCC)  -     POCT 6 minute walk    Patient may follow up in 3 months or sooner as necessary  Orders:  Orders Placed This Encounter   Procedures    POCT 6 minute walk       Subjective: The patient notes that she is about the same  She denies fevers, chills, nausea or vomiting  She has difficulty with the albuterol inhaler likely based on technique  She is wearing her oxygen with exertion and sleep  She will typically notice an SpO2 in the 90s       Inhaler Regimen:  Trelegy - daily  Albuterol HFA 3-5/daily  Albuterol nebulizer - 2x/daily    Remainder of review of systems negative except as described in HPI  The following portions of the patient's history were reviewed and updated as appropriate: allergies, current medications, past family history, past medical history, past social history, past surgical history and problem list      Objective:   Vitals: Blood pressure 118/70, pulse (!) 126, temperature 98 3 °F (36 8 °C), temperature source Tympanic, resp  rate 18, height 4' 9" (1 448 m), weight 70 8 kg (156 lb), SpO2 92 % , RA, Body mass index is 33 76 kg/m²  Physical Exam  Gen: Pleasant, awake, alert, oriented x 3, no acute distress  HEENT: Mucous membranes moist, no oral lesions, no thrush  NECK: No accessory muscle use, JVP not elevated  Cardiac:  Regular rate rhythm, single S1, single S2, no murmurs, no rubs, no gallops  Lungs:  Decreased breath sounds  Abdomen: normoactive bowel sounds, soft nontender, nondistended, no rebound or rigidity, no guarding, obese  Extremities: no cyanosis, no clubbing, no LE edema  MSK:  Strength equal in all extremities  Derm:  No rashes/lesions noted  Neuro:  Appropriate mood/affect    Labs: I have personally reviewed pertinent lab results  Lab Results   Component Value Date    WBC 6 4 09/26/2018    WBC 9 57 09/11/2017    HGB 13 5 09/26/2018    HGB 10 8 (L) 09/11/2017     09/26/2018     09/11/2017     Lab Results   Component Value Date    CREATININE 0 87 04/27/2022        Imaging and other studies: I have personally reviewed pertinent reports  and I have personally reviewed pertinent films in PACS  Ct lung screening 12/7/2021  My interpretation:  Stable lung nodules    Radiology findings:  LUNGS:  Central airways are patent  Mild to moderate emphysema  Lung nodules as follows (series 3):  2 mm right upper lobe nodule on image #14, stable  3 mm right middle lobe nodule on image #64, stable  6 mm right lower lobe nodule on image #79, stable  PLEURA:  Unremarkable    HEART/GREAT VESSELS: Heart is unremarkable for patient's age  No thoracic aortic aneurysm  MEDIASTINUM AND TAYLER:  Unremarkable  Pulmonary Function Testing: I have personally reviewed pertinent reports  and I have personally reviewed pertinent films in PACS  7/2/2021: - limited interpretation of DLCO; severe obstruction  FEV1/FVC Ratio: 49 %  Forced Vital Capacity: 1 65 L    70 % predicted  FEV1: 0 80 L     40 % predicted     Lung volumes by body plethysmography:   Total Lung Capacity 83 % predicted   Residual volume 110 % predicted     DLCO corrected for patients hemoglobin level: 25 %    Ramone Lerma

## 2022-09-07 DIAGNOSIS — J44.9 CHRONIC OBSTRUCTIVE PULMONARY DISEASE, UNSPECIFIED COPD TYPE (HCC): Chronic | ICD-10-CM

## 2022-09-07 RX ORDER — LEVALBUTEROL INHALATION SOLUTION 1.25 MG/3ML
SOLUTION RESPIRATORY (INHALATION)
Qty: 180 ML | Refills: 3 | Status: SHIPPED | OUTPATIENT
Start: 2022-09-07

## 2022-09-22 ENCOUNTER — TELEPHONE (OUTPATIENT)
Dept: PULMONOLOGY | Facility: CLINIC | Age: 72
End: 2022-09-22

## 2022-09-22 NOTE — TELEPHONE ENCOUNTER
Linda Cortez, from [place] [address], called re: they are in the process of re-qualifying Vi Graham for her O2 and they need the chart notes from 8/22/22 faxed to them at [phone]    Please advise Nettie Citizen): [phone]

## 2022-10-07 DIAGNOSIS — E78.5 HYPERLIPIDEMIA, UNSPECIFIED HYPERLIPIDEMIA TYPE: Chronic | ICD-10-CM

## 2022-10-07 RX ORDER — SIMVASTATIN 20 MG
TABLET ORAL
Qty: 90 TABLET | Refills: 1 | Status: SHIPPED | OUTPATIENT
Start: 2022-10-07

## 2022-10-12 PROBLEM — Z00.00 MEDICARE ANNUAL WELLNESS VISIT, SUBSEQUENT: Status: RESOLVED | Noted: 2018-04-11 | Resolved: 2022-10-12

## 2022-10-26 ENCOUNTER — APPOINTMENT (OUTPATIENT)
Dept: LAB | Facility: CLINIC | Age: 72
End: 2022-10-26

## 2022-10-26 DIAGNOSIS — E78.5 HYPERLIPIDEMIA, UNSPECIFIED HYPERLIPIDEMIA TYPE: Chronic | ICD-10-CM

## 2022-10-26 DIAGNOSIS — E11.9 TYPE 2 DIABETES MELLITUS WITHOUT COMPLICATION, WITHOUT LONG-TERM CURRENT USE OF INSULIN (HCC): Chronic | ICD-10-CM

## 2022-10-26 LAB
ALBUMIN SERPL BCP-MCNC: 3.7 G/DL (ref 3.5–5)
ALP SERPL-CCNC: 87 U/L (ref 46–116)
ALT SERPL W P-5'-P-CCNC: 25 U/L (ref 12–78)
ANION GAP SERPL CALCULATED.3IONS-SCNC: 6 MMOL/L (ref 4–13)
AST SERPL W P-5'-P-CCNC: 16 U/L (ref 5–45)
BILIRUB SERPL-MCNC: 0.54 MG/DL (ref 0.2–1)
BUN SERPL-MCNC: 20 MG/DL (ref 5–25)
CALCIUM SERPL-MCNC: 9.7 MG/DL (ref 8.3–10.1)
CHLORIDE SERPL-SCNC: 104 MMOL/L (ref 96–108)
CHOLEST SERPL-MCNC: 176 MG/DL
CO2 SERPL-SCNC: 27 MMOL/L (ref 21–32)
CREAT SERPL-MCNC: 0.86 MG/DL (ref 0.6–1.3)
CREAT UR-MCNC: 151 MG/DL
EST. AVERAGE GLUCOSE BLD GHB EST-MCNC: 137 MG/DL
GFR SERPL CREATININE-BSD FRML MDRD: 67 ML/MIN/1.73SQ M
GLUCOSE P FAST SERPL-MCNC: 122 MG/DL (ref 65–99)
HBA1C MFR BLD: 6.4 %
HDLC SERPL-MCNC: 70 MG/DL
LDLC SERPL CALC-MCNC: 80 MG/DL (ref 0–100)
MICROALBUMIN UR-MCNC: 12.3 MG/L (ref 0–20)
MICROALBUMIN/CREAT 24H UR: 8 MG/G CREATININE (ref 0–30)
NONHDLC SERPL-MCNC: 106 MG/DL
POTASSIUM SERPL-SCNC: 4 MMOL/L (ref 3.5–5.3)
PROT SERPL-MCNC: 8.1 G/DL (ref 6.4–8.4)
SODIUM SERPL-SCNC: 137 MMOL/L (ref 135–147)
TRIGL SERPL-MCNC: 130 MG/DL

## 2022-11-03 ENCOUNTER — OFFICE VISIT (OUTPATIENT)
Dept: FAMILY MEDICINE CLINIC | Facility: CLINIC | Age: 72
End: 2022-11-03

## 2022-11-03 VITALS
HEIGHT: 57 IN | DIASTOLIC BLOOD PRESSURE: 72 MMHG | BODY MASS INDEX: 33.7 KG/M2 | HEART RATE: 115 BPM | OXYGEN SATURATION: 94 % | WEIGHT: 156.2 LBS | SYSTOLIC BLOOD PRESSURE: 118 MMHG | RESPIRATION RATE: 16 BRPM

## 2022-11-03 DIAGNOSIS — Z00.00 MEDICARE ANNUAL WELLNESS VISIT, INITIAL: ICD-10-CM

## 2022-11-03 DIAGNOSIS — J43.2 CENTRILOBULAR EMPHYSEMA (HCC): Chronic | ICD-10-CM

## 2022-11-03 DIAGNOSIS — E11.9 TYPE 2 DIABETES MELLITUS WITHOUT COMPLICATION, WITHOUT LONG-TERM CURRENT USE OF INSULIN (HCC): Primary | Chronic | ICD-10-CM

## 2022-11-03 DIAGNOSIS — E78.5 HYPERLIPIDEMIA, UNSPECIFIED HYPERLIPIDEMIA TYPE: Chronic | ICD-10-CM

## 2022-11-03 DIAGNOSIS — Z12.31 VISIT FOR SCREENING MAMMOGRAM: ICD-10-CM

## 2022-11-03 RX ORDER — SIMVASTATIN 20 MG
20 TABLET ORAL
Qty: 90 TABLET | Refills: 1 | Status: SHIPPED | OUTPATIENT
Start: 2022-11-03

## 2022-11-03 NOTE — PATIENT INSTRUCTIONS
Medicare Preventive Visit Patient Instructions  Thank you for completing your Welcome to Medicare Visit or Medicare Annual Wellness Visit today  Your next wellness visit will be due in one year (11/4/2023)  The screening/preventive services that you may require over the next 5-10 years are detailed below  Some tests may not apply to you based off risk factors and/or age  Screening tests ordered at today's visit but not completed yet may show as past due  Also, please note that scanned in results may not display below  Preventive Screenings:  Service Recommendations Previous Testing/Comments   Colorectal Cancer Screening  * Colonoscopy    * Fecal Occult Blood Test (FOBT)/Fecal Immunochemical Test (FIT)  * Fecal DNA/Cologuard Test  * Flexible Sigmoidoscopy Age: 39-70 years old   Colonoscopy: every 10 years (may be performed more frequently if at higher risk)  OR  FOBT/FIT: every 1 year  OR  Cologuard: every 3 years  OR  Sigmoidoscopy: every 5 years  Screening may be recommended earlier than age 39 if at higher risk for colorectal cancer  Also, an individualized decision between you and your healthcare provider will decide whether screening between the ages of 74-80 would be appropriate  Colonoscopy: 03/02/2004  FOBT/FIT: Not on file  Cologuard: Not on file  Sigmoidoscopy: Not on file          Breast Cancer Screening Age: 36 years old  Frequency: every 1-2 years  Not required if history of left and right mastectomy Mammogram: 02/17/2022    Screening Current   Cervical Cancer Screening Between the ages of 21-29, pap smear recommended once every 3 years  Between the ages of 33-67, can perform pap smear with HPV co-testing every 5 years     Recommendations may differ for women with a history of total hysterectomy, cervical cancer, or abnormal pap smears in past  Pap Smear: Not on file    Screening Not Indicated   Hepatitis C Screening Once for adults born between 1945 and 1965  More frequently in patients at high risk for Hepatitis C Hep C Antibody: Not on file    Screening Current   Diabetes Screening 1-2 times per year if you're at risk for diabetes or have pre-diabetes Fasting glucose: 122 mg/dL (10/26/2022)  A1C: 6 4 % (10/26/2022)  Screening Not Indicated  History Diabetes   Cholesterol Screening Once every 5 years if you don't have a lipid disorder  May order more often based on risk factors  Lipid panel: 10/26/2022    Screening Not Indicated  History Lipid Disorder     Other Preventive Screenings Covered by Medicare:  1  Abdominal Aortic Aneurysm (AAA) Screening: covered once if your at risk  You're considered to be at risk if you have a family history of AAA  2  Lung Cancer Screening: covers low dose CT scan once per year if you meet all of the following conditions: (1) Age 50-69; (2) No signs or symptoms of lung cancer; (3) Current smoker or have quit smoking within the last 15 years; (4) You have a tobacco smoking history of at least 20 pack years (packs per day multiplied by number of years you smoked); (5) You get a written order from a healthcare provider  3  Glaucoma Screening: covered annually if you're considered high risk: (1) You have diabetes OR (2) Family history of glaucoma OR (3)  aged 48 and older OR (3)  American aged 72 and older  3  Osteoporosis Screening: covered every 2 years if you meet one of the following conditions: (1) You're estrogen deficient and at risk for osteoporosis based off medical history and other findings; (2) Have a vertebral abnormality; (3) On glucocorticoid therapy for more than 3 months; (4) Have primary hyperparathyroidism; (5) On osteoporosis medications and need to assess response to drug therapy  · Last bone density test (DXA Scan): Not on file  5  HIV Screening: covered annually if you're between the age of 12-76  Also covered annually if you are younger than 13 and older than 72 with risk factors for HIV infection   For pregnant patients, it is covered up to 3 times per pregnancy  Immunizations:  Immunization Recommendations   Influenza Vaccine Annual influenza vaccination during flu season is recommended for all persons aged >= 6 months who do not have contraindications   Pneumococcal Vaccine   * Pneumococcal conjugate vaccine = PCV13 (Prevnar 13), PCV15 (Vaxneuvance), PCV20 (Prevnar 20)  * Pneumococcal polysaccharide vaccine = PPSV23 (Pneumovax) Adults 25-60 years old: 1-3 doses may be recommended based on certain risk factors  Adults 72 years old: 1-2 doses may be recommended based off what pneumonia vaccine you previously received   Hepatitis B Vaccine 3 dose series if at intermediate or high risk (ex: diabetes, end stage renal disease, liver disease)   Tetanus (Td) Vaccine - COST NOT COVERED BY MEDICARE PART B Following completion of primary series, a booster dose should be given every 10 years to maintain immunity against tetanus  Td may also be given as tetanus wound prophylaxis  Tdap Vaccine - COST NOT COVERED BY MEDICARE PART B Recommended at least once for all adults  For pregnant patients, recommended with each pregnancy  Shingles Vaccine (Shingrix) - COST NOT COVERED BY MEDICARE PART B  2 shot series recommended in those aged 48 and above     Health Maintenance Due:      Topic Date Due   • Cervical Cancer Screening  Never done   • Lung Cancer Screening  12/07/2022   • Colorectal Cancer Screening  11/03/2022 (Originally 11/25/1994)   • Hepatitis C Screening  12/22/2022 (Originally 1950)   • Breast Cancer Screening: Mammogram  02/17/2023     Immunizations Due:      Topic Date Due   • Hepatitis A Vaccine (1 of 2 - Risk 2-dose series) Never done   • Hepatitis B Vaccine (1 of 3 - Risk 3-dose series) Never done   • COVID-19 Vaccine (3 - Booster for Moderna series) 04/02/2022     Advance Directives   What are advance directives? Advance directives are legal documents that state your wishes and plans for medical care   These plans are made ahead of time in case you lose your ability to make decisions for yourself  Advance directives can apply to any medical decision, such as the treatments you want, and if you want to donate organs  What are the types of advance directives? There are many types of advance directives, and each state has rules about how to use them  You may choose a combination of any of the following:  · Living will: This is a written record of the treatment you want  You can also choose which treatments you do not want, which to limit, and which to stop at a certain time  This includes surgery, medicine, IV fluid, and tube feedings  · Durable power of  for healthcare [facility]): This is a written record that states who you want to make healthcare choices for you when you are unable to make them for yourself  This person, called a proxy, is usually a family member or a friend  You may choose more than 1 proxy  · Do not resuscitate (DNR) order:  A DNR order is used in case your heart stops beating or you stop breathing  It is a request not to have certain forms of treatment, such as CPR  A DNR order may be included in other types of advance directives  · Medical directive: This covers the care that you want if you are in a coma, near death, or unable to make decisions for yourself  You can list the treatments you want for each condition  Treatment may include pain medicine, surgery, blood transfusions, dialysis, IV or tube feedings, and a ventilator (breathing machine)  · Values history: This document has questions about your views, beliefs, and how you feel and think about life  This information can help others choose the care that you would choose  Why are advance directives important? An advance directive helps you control your care  Although spoken wishes may be used, it is better to have your wishes written down  Spoken wishes can be misunderstood, or not followed   Treatments may be given even if you do not want them  An advance directive may make it easier for your family to make difficult choices about your care  Weight Management   Why it is important to manage your weight:  Being overweight increases your risk of health conditions such as heart disease, high blood pressure, type 2 diabetes, and certain types of cancer  It can also increase your risk for osteoarthritis, sleep apnea, and other respiratory problems  Aim for a slow, steady weight loss  Even a small amount of weight loss can lower your risk of health problems  How to lose weight safely:  A safe and healthy way to lose weight is to eat fewer calories and get regular exercise  You can lose up about 1 pound a week by decreasing the number of calories you eat by 500 calories each day  Healthy meal plan for weight management:  A healthy meal plan includes a variety of foods, contains fewer calories, and helps you stay healthy  A healthy meal plan includes the following:  · Eat whole-grain foods more often  A healthy meal plan should contain fiber  Fiber is the part of grains, fruits, and vegetables that is not broken down by your body  Whole-grain foods are healthy and provide extra fiber in your diet  Some examples of whole-grain foods are whole-wheat breads and pastas, oatmeal, brown rice, and bulgur  · Eat a variety of vegetables every day  Include dark, leafy greens such as spinach, kale, bharath greens, and mustard greens  Eat yellow and orange vegetables such as carrots, sweet potatoes, and winter squash  · Eat a variety of fruits every day  Choose fresh or canned fruit (canned in its own juice or light syrup) instead of juice  Fruit juice has very little or no fiber  · Eat low-fat dairy foods  Drink fat-free (skim) milk or 1% milk  Eat fat-free yogurt and low-fat cottage cheese  Try low-fat cheeses such as mozzarella and other reduced-fat cheeses  · Choose meat and other protein foods that are low in fat    Choose beans or other legumes such as split peas or lentils  Choose fish, skinless poultry (chicken or turkey), or lean cuts of red meat (beef or pork)  Before you cook meat or poultry, cut off any visible fat  · Use less fat and oil  Try baking foods instead of frying them  Add less fat, such as margarine, sour cream, regular salad dressing and mayonnaise to foods  Eat fewer high-fat foods  Some examples of high-fat foods include french fries, doughnuts, ice cream, and cakes  · Eat fewer sweets  Limit foods and drinks that are high in sugar  This includes candy, cookies, regular soda, and sweetened drinks  Exercise:  Exercise at least 30 minutes per day on most days of the week  Some examples of exercise include walking, biking, dancing, and swimming  You can also fit in more physical activity by taking the stairs instead of the elevator or parking farther away from stores  Ask your healthcare provider about the best exercise plan for you  © Copyright [employer] 2018 Information is for End User's use only and may not be sold, redistributed or otherwise used for commercial purposes   All illustrations and images included in CareNotes® are the copyrighted property of A D A M , [employer]  or [address] [place] [address]

## 2022-11-03 NOTE — ASSESSMENT & PLAN NOTE
Lab Results   Component Value Date    HGBA1C 6 4 (H) 10/26/2022   A1c stable  Continue metformin 500 milligram daily  Continue monitoring  Renal function stable    Patient does not have Eye insurance unable to go for eye exam

## 2022-11-03 NOTE — PROGRESS NOTES
Assessment and Plan:     Problem List Items Addressed This Visit    None       BMI Counseling: Body mass index is 33 8 kg/m²  The BMI is above normal  Nutrition recommendations include decreasing portion sizes, encouraging healthy choices of fruits and vegetables and decreasing fast food intake  Exercise recommendations include moderate physical activity 150 minutes/week  No pharmacotherapy was ordered  Rationale for BMI follow-up plan is due to patient being overweight or obese  Depression Screening and Follow-up Plan: Patient was screened for depression during today's encounter  They screened negative with a PHQ-2 score of 0  Preventive health issues were discussed with patient, and age appropriate screening tests were ordered as noted in patient's After Visit Summary  Personalized health advice and appropriate referrals for health education or preventive services given if needed, as noted in patient's After Visit Summary  History of Present Illness:     Patient presents for a Medicare Wellness Visit    Diabetes  She presents for her follow-up diabetic visit  She has type 2 diabetes mellitus  Her disease course has been stable (A1c 6 4)  There are no hypoglycemic associated symptoms  There are no diabetic associated symptoms  Pertinent negatives for diabetes include no chest pain  There are no hypoglycemic complications  Symptoms are stable  There are no diabetic complications  Risk factors for coronary artery disease include diabetes mellitus, dyslipidemia and post-menopausal  Current diabetic treatment includes oral agent (monotherapy) (Metformin 500 milligram daily)  She is compliant with treatment all of the time  She is following a generally healthy diet  Meal planning includes avoidance of concentrated sweets  Hyperlipidemia  This is a chronic problem  The current episode started more than 1 year ago  The problem is controlled   Recent lipid tests were reviewed and are normal  Pertinent negatives include no chest pain, myalgias or shortness of breath  Current antihyperlipidemic treatment includes statins  The current treatment provides significant improvement of lipids  There are no compliance problems  Patient Care Team:  Gale Sanchez MD as PCP - General (Family Medicine)  Francisco Javier Maharaj MD     Review of Systems:     Review of Systems   Constitutional: Negative  Respiratory: Negative  Negative for shortness of breath  Cardiovascular: Negative  Negative for chest pain and palpitations  Musculoskeletal: Negative for myalgias  Neurological: Negative  Psychiatric/Behavioral: Negative           Problem List:     Patient Active Problem List   Diagnosis   • Dyspnea   • COPD (chronic obstructive pulmonary disease) ([facility] )   • Type 2 diabetes mellitus without complication, without long-term current use of insulin (HCC)   • HLD (hyperlipidemia)   • Lung nodule   • Musculoskeletal neck pain   • Need for pneumococcal vaccination   • Tobacco abuse, in remission   • Chronic tachycardia   • BMI 28 0-28 9,adult   • Chronic hypoxemic respiratory failure (HCC)      Past Medical and Surgical History:     Past Medical History:   Diagnosis Date   • COPD (chronic obstructive pulmonary disease) ([facility] )    • Diabetes mellitus ([place] [id number] )    • Hyperlipidemia    • Pulmonary nodule 8/31/2017     Past Surgical History:   Procedure Laterality Date   • HAND SURGERY     • TENDON TRANSFER      Left lower arm      Family History:     Family History   Problem Relation Age of Onset   • COPD Sister    • Diabetes Sister    • Substance Abuse Father    • Liver disease Mother    • No Known Problems Daughter    • No Known Problems Daughter    • No Known Problems Son    • No Known Problems Sister    • No Known Problems Half-Brother       Social History:     Social History     Socioeconomic History   • Marital status: /Civil Union     Spouse name: Not on file   • Number of children: Not on file   • Years of education: Not on file   • Highest education level: Not on file   Occupational History   • Not on file   Tobacco Use   • Smoking status: Former Smoker     Packs/day: 1 00     Years: 47 00     Pack years: 47 00     Types: Cigarettes     Start date: 5     Quit date: 2017     Years since quittin 8   • Smokeless tobacco: Never Used   Vaping Use   • Vaping Use: Never used   Substance and Sexual Activity   • Alcohol use: No   • Drug use: No   • Sexual activity: Not on file   Other Topics Concern   • Not on file   Social History Narrative    Most recent tobacco use screenin2017     Social Determinants of Health     Financial Resource Strain: Not on file   Food Insecurity: Not on file   Transportation Needs: Not on file   Physical Activity: Not on file   Stress: Not on file   Social Connections: Not on file   Intimate Partner Violence: Not on file   Housing Stability: Not on file      Medications and Allergies:     Current Outpatient Medications   Medication Sig Dispense Refill   • albuterol (PROVENTIL HFA,VENTOLIN HFA) 90 mcg/act inhaler Inhale 1 puff every 4 (four) hours as needed for wheezing 18 g 5   • Calcium 500-100 MG-UNIT CHEW Chew 1 tablet daily     • cyclobenzaprine (FLEXERIL) 5 mg tablet Take 1 tablet (5 mg total) by mouth daily at bedtime 15 tablet 0   • fexofenadine (ALLEGRA) 180 MG tablet Take 180 mg by mouth daily     • fluticasone-umeclidinium-vilanterol (TRELEGY) 100-62 5-25 MCG/INH inhaler Inhale 1 puff daily Rinse mouth after use   1 each 5   • guaiFENesin (MUCINEX) 600 mg 12 hr tablet Take 2 tablets (1,200 mg total) by mouth every 12 (twelve) hours 30 tablet 0   • levalbuterol (XOPENEX) 1 25 mg/3 mL nebulizer solution TAKE 1 VIAL BY NEBULIZATION EVERY 8 (EIGHT) HOURS AS NEEDED FOR WHEEZING OR SHORTNESS OF BREATH 180 mL 3   • metFORMIN (GLUCOPHAGE) 500 mg tablet Take 1 tablet (500 mg total) by mouth daily with breakfast 90 tablet 1   • methylPREDNISolone 4 MG tablet therapy pack Use as directed on package 1 tablet 0   • simvastatin (ZOCOR) 20 mg tablet TAKE 1 TABLET BY MOUTH DAILY AT BEDTIME 90 tablet 1     No current facility-administered medications for this visit  Allergies   Allergen Reactions   • Prednisone      Facial redness and sob   • Medrol [Methylprednisolone] Angioedema      Immunizations:     Immunization History   Administered Date(s) Administered   • COVID-19 MODERNA VACC 0 5 ML IM 02/12/2021, 11/02/2021   • INFLUENZA 10/01/2012, 09/09/2013, 09/11/2013, 10/01/2014, 10/10/2014, 09/30/2015, 10/11/2016, 09/27/2017, 09/26/2018, 09/03/2020, 09/10/2021   • Influenza Split High Dose Preservative Free IM 10/03/2017, 09/18/2019   • Influenza, high dose seasonal 0 7 mL 09/03/2020   • Pneumococcal Conjugate 13-Valent 10/10/2011   • Pneumococcal Polysaccharide PPV23 10/10/2013, 04/18/2019      Health Maintenance:         Topic Date Due   • Cervical Cancer Screening  Never done   • Lung Cancer Screening  12/07/2022   • Colorectal Cancer Screening  11/03/2022 (Originally 11/25/1995)   • Hepatitis C Screening  12/22/2022 (Originally 1950)   • Breast Cancer Screening: Mammogram  02/17/2023         Topic Date Due   • Hepatitis A Vaccine (1 of 2 - Risk 2-dose series) Never done   • Hepatitis B Vaccine (1 of 3 - Risk 3-dose series) Never done   • COVID-19 Vaccine (3 - Booster for Moderna series) 04/02/2022      Medicare Screening Tests and Risk Assessments:     Last Medicare Wellness visit information reviewed, patient interviewed, no change since last AWV  Health Risk Assessment:   Patient rates overall health as good  Patient feels that their physical health rating is same  Patient is satisfied with their life  Eyesight was rated as same  Hearing was rated as same  Patient feels that their emotional and mental health rating is same  Patients states they are never, rarely angry  Patient states they are sometimes unusually tired/fatigued   Pain experienced in the last 7 days has been none  Patient states that she has experienced no weight loss or gain in last 6 months  Depression Screening:   PHQ-2 Score: 0      Fall Risk Screening: In the past year, patient has experienced: no history of falling in past year      Home Safety:  Patient does not have trouble with stairs inside or outside of their home  Patient has working smoke alarms and has working carbon monoxide detector  Home safety hazards include: none  Medications:   Patient is not currently taking any over-the-counter supplements  Patient is able to manage medications  Activities of Daily Living (ADLs)/Instrumental Activities of Daily Living (IADLs):   Walk and transfer into and out of bed and chair?: Yes  Dress and groom yourself?: Yes    Bathe or shower yourself?: Yes    Feed yourself? Yes  Do your laundry/housekeeping?: Yes  Manage your money, pay your bills and track your expenses?: Yes  Make your own meals?: Yes    Do your own shopping?: Yes    Previous Hospitalizations:   Any hospitalizations or ED visits within the last 12 months?: No      Advance Care Planning:   Living will: Yes    Durable POA for healthcare:  Yes    Advanced directive: Yes      Cognitive Screening:   Provider or family/friend/caregiver concerned regarding cognition?: No    PREVENTIVE SCREENINGS      Cardiovascular Screening:    General: Screening Not Indicated and History Lipid Disorder      Diabetes Screening:     General: Screening Not Indicated and History Diabetes      Colorectal Cancer Screening:     General: Risks and Benefits Discussed and Patient Declines      Breast Cancer Screening:     General: Screening Current      Cervical Cancer Screening:    General: Screening Not Indicated      Osteoporosis Screening:    General: Risks and Benefits Discussed and Patient Declines      Abdominal Aortic Aneurysm (AAA) Screening:        General: Risks and Benefits Discussed and Screening Not Indicated      Lung Cancer Screening:     General: Screening Current      Hepatitis C Screening:    General: Screening Current    Other Counseling Topics:   Car/seat belt/driving safety, skin self-exam, sunscreen and calcium and vitamin D intake and regular weightbearing exercise  No exam data present     Physical Exam:     Resp 16   Ht 4' 9" (1 448 m)   Wt 70 9 kg (156 lb 3 2 oz)   BMI 33 80 kg/m²     Physical Exam  Vitals and nursing note reviewed  HENT:      Right Ear: External ear normal       Left Ear: External ear normal    Eyes:      Conjunctiva/sclera: Conjunctivae normal    Cardiovascular:      Rate and Rhythm: Normal rate and regular rhythm  Heart sounds: Normal heart sounds  Pulmonary:      Effort: Pulmonary effort is normal       Breath sounds: Normal breath sounds  Abdominal:      General: Bowel sounds are normal       Palpations: Abdomen is soft  Musculoskeletal:         General: Normal range of motion  Cervical back: Normal range of motion and neck supple  Skin:     General: Skin is warm  Neurological:      Mental Status: She is alert and oriented to person, place, and time  Psychiatric:         Behavior: Behavior normal          Thought Content:  Thought content normal          Judgment: Judgment normal           Tess Mcgrath MD

## 2022-12-05 ENCOUNTER — OFFICE VISIT (OUTPATIENT)
Dept: PULMONOLOGY | Facility: CLINIC | Age: 72
End: 2022-12-05

## 2022-12-05 VITALS
TEMPERATURE: 97.4 F | HEIGHT: 57 IN | WEIGHT: 156 LBS | BODY MASS INDEX: 33.66 KG/M2 | HEART RATE: 119 BPM | DIASTOLIC BLOOD PRESSURE: 80 MMHG | OXYGEN SATURATION: 93 % | RESPIRATION RATE: 20 BRPM | SYSTOLIC BLOOD PRESSURE: 140 MMHG

## 2022-12-05 DIAGNOSIS — R00.0 TACHYCARDIA: ICD-10-CM

## 2022-12-05 DIAGNOSIS — F17.201 TOBACCO ABUSE, IN REMISSION: Primary | ICD-10-CM

## 2022-12-05 DIAGNOSIS — Z87.891 PERSONAL HISTORY OF NICOTINE DEPENDENCE: ICD-10-CM

## 2022-12-05 NOTE — PROGRESS NOTES
Progress Note - Pulmonary   Greg Lancaster 67 y o  female MRN: [id number]   Encounter: [id number]      Assessment/Plan:  Patient is a 39-year-old female with past medical history significant for severe COPD, chronic hypoxemic respiratory failure who presents for routine follow-up  The patient does report she does have periods of dyspnea exertion and difficulty with activity  Overall her breathing is stable  The primary concern is related to her persistent tachycardia  Review of previous visits notes that her tachycardia is typically in the 110s  System tachycardia, will check echocardiogram   Encouraged patient to monitor heart rates at home with both activity and at rest     Tachycardia  -  patient tachycardia noted on oxygen titration study  -  but will consider checking EKG at next visit  -  Monitor at home   -  With both rest and exertion  -Check echocardiogram     Severe COPD  -  does not qualify for EBV at this time given low RV  -  continue Trelegy; albuterol p r n               -  spacer provided; encourage use with albuterol HFA     Chronic hypoxemic respiratory failure  -  Currently using 2L w/ exertion and occasionally to sleep  -  Encouraged to use supplemental oxygen more frequently with exertion     Dyspnea  -  Related to COPD  -  encouraged increased exercise  -  Will further evaluate tachycardia     Tobacco abuse  -  Quit 2017  -  Continue lung cancer screening - next due 12/2022    1  Tobacco abuse, in remission  -     CT lung screening program; Future; Expected date: 12/05/2022    2  Personal history of nicotine dependence  -     CT lung screening program; Future; Expected date: 12/05/2022    3  Tachycardia  -     Echo complete w/ contrast if indicated; Future; Expected date: 12/05/2022        Patient may follow up in 3 months or sooner as necessary       Orders:  Orders Placed This Encounter   Procedures   • CT lung screening program     Standing Status:   Future     Standing Expiration Date:   12/5/2023     Scheduling Instructions: If possible wear clothing without any metal in the chest and abdomen area  Sweat suit, shorts, sports bra or bra without underwire may eliminate the need to change  Please arrive 15 minutes prior to your appointment time to register  On the day of the test, please bring any prior CT or MRI studies of this area with you that were not performed at a [facility] facility  This is now being billed through your insurance and if you have a copay, it is required at time of service  Otherwise, you can go through   To schedule this appointment, please contact Central Scheduling at [phone]  Order Specific Question:   What is the patient's sedation requirement? Answer:   No Sedation     Order Specific Question:   Does patient have a 20 pack year smoking history? (Equivalent to 1 pack of cigarettes per day for a year) IF NO, PATIENT IS NOT ELIGIBLE FOR THIS PROGRAM      Answer:   Yes     Order Specific Question:   Has the patient been a current smoker or one who has quit smoking within the last 15 years? IF NO, PATIENT IS NOT ELIGIBLE FOR THIS PROGRAM      Answer:   Yes     Order Specific Question:   Does the patient show any signs or symptoms of lung cancer? Answer:   No     Order Specific Question:   Is this the first (baseline) CT or an annual exam?     Answer: Annual [2]     Order Specific Question:   Release to patient through [email or web address]     Answer:   Immediate     Order Specific Question:   Reason for Exam (FREE TEXT)     Answer:   screening for lung cancer     Order Specific Question:   Is this a low dose CT or a routine CT? Answer:   Low Dose CT [1]     Order Specific Question:   Is there documentation of shared decision making? Answer: Yes   • Echo complete w/ contrast if indicated     Standing Status:   Future     Standing Expiration Date:   12/5/2026     Scheduling Instructions:       There is no prep required  Please bring your insurance cards, a form of photo ID and a list of your medications with you  Arrive 15 minutes prior to your appointment time in order to register  To schedule this appointment, please contact Central Scheduling at [phone]  Subjective: The patient reports she is doing well  The patient had COVID in early November  They required no extra treatments  Inhaler Regimen:  Trelegy - daily  Albuterol HFA 3-5x/daily  Albuterol nebulizer - 2x/daily    Remainder of review of systems negative except as described in HPI  The following portions of the patient's history were reviewed and updated as appropriate: allergies, current medications, past family history, past medical history, past social history, past surgical history and problem list      Objective:   Vitals: Blood pressure 140/80, pulse (!) 119, temperature (!) 97 4 °F (36 3 °C), temperature source Tympanic, resp  rate 20, height 4' 9" (1 448 m), weight 70 8 kg (156 lb), SpO2 93 % , RA, Body mass index is 33 76 kg/m²  Physical Exam  Gen: Pleasant, awake, alert, oriented x 3, no acute distress  HEENT: Mucous membranes moist, no oral lesions, no thrush  NECK: No accessory muscle use, JVP not elevated  Cardiac: Tachycardic, single S1, single S2, no murmurs, no rubs, no gallops  Lungs: Decreased breath sounds, no wheezing/rhonchi/crackles  Abdomen: normoactive bowel sounds, soft nontender, nondistended, no rebound or rigidity, no guarding, obese  Extremities: no cyanosis, no clubbing, no LE edema  MSK:  Strength equal in all extremities  Derm:  No rashes/lesions noted  Neuro:  Appropriate mood/affect    Labs: I have personally reviewed pertinent lab results    Lab Results   Component Value Date    WBC 6 4 09/26/2018    WBC 9 57 09/11/2017    HGB 13 5 09/26/2018    HGB 10 8 (L) 09/11/2017     09/26/2018     09/11/2017     Lab Results   Component Value Date    CREATININE 0 86 10/26/2022     Imaging and other studies: I have personally reviewed pertinent reports  and I have personally reviewed pertinent films in PACS  Ct lung screening 12/7/2021  My interpretation:  Stable nodules    Radiology findings:  LUNGS:  Central airways are patent  Mild to moderate emphysema  Lung nodules as follows (series 3):  2 mm right upper lobe nodule on image #14, stable  3 mm right middle lobe nodule on image #64, stable  6 mm right lower lobe nodule on image #79, stable  PLEURA:  Unremarkable  HEART/GREAT VESSELS: Heart is unremarkable for patient's age  No thoracic aortic aneurysm  MEDIASTINUM AND TAYLER:  Unremarkable  CHEST WALL AND LOWER NECK:   Unremarkable  VISUALIZED STRUCTURES IN THE UPPER ABDOMEN:  Cholelithiasis  Small hiatal hernia  Pulmonary Function Testing: I have personally reviewed pertinent reports  and I have personally reviewed pertinent films in PACS  7/2/2021: - limited based on DLCO; severe obstruction  FEV1/FVC Ratio: 49 %  Forced Vital Capacity: 1 65 L    70 % predicted  FEV1: 0 80 L     40 % predicted     Lung volumes by body plethysmography:   Total Lung Capacity 83 % predicted   Residual volume 110 % predicted     DLCO corrected for patients hemoglobin level: 25 %    [facility]

## 2023-01-02 PROBLEM — Z00.00 MEDICARE ANNUAL WELLNESS VISIT, INITIAL: Status: RESOLVED | Noted: 2018-04-11 | Resolved: 2023-01-02

## 2023-01-06 ENCOUNTER — HOSPITAL ENCOUNTER (OUTPATIENT)
Dept: NON INVASIVE DIAGNOSTICS | Facility: CLINIC | Age: 73
Discharge: HOME | End: 2023-01-06

## 2023-01-06 ENCOUNTER — HOSPITAL ENCOUNTER (OUTPATIENT)
Dept: CT IMAGING | Facility: HOSPITAL | Age: 73
Discharge: HOME | End: 2023-01-06
Attending: INTERNAL MEDICINE

## 2023-01-06 VITALS
HEIGHT: 57 IN | BODY MASS INDEX: 33.66 KG/M2 | HEART RATE: 88 BPM | DIASTOLIC BLOOD PRESSURE: 80 MMHG | SYSTOLIC BLOOD PRESSURE: 140 MMHG | WEIGHT: 156 LBS

## 2023-01-06 DIAGNOSIS — R00.0 TACHYCARDIA: ICD-10-CM

## 2023-01-06 DIAGNOSIS — Z87.891 PERSONAL HISTORY OF NICOTINE DEPENDENCE: ICD-10-CM

## 2023-01-06 DIAGNOSIS — F17.201 TOBACCO ABUSE, IN REMISSION: ICD-10-CM

## 2023-01-06 LAB
AORTIC ROOT: 3.4 CM
APICAL FOUR CHAMBER EJECTION FRACTION: 66 %
ASCENDING AORTA: 3.1 CM
AV LVOT MEAN GRADIENT: 1 MMHG
AV LVOT PEAK GRADIENT: 1 MMHG
AV PEAK GRADIENT: 5 MMHG
DOP CALC LVOT PEAK VEL VTI: 14.74 CM
DOP CALC LVOT PEAK VEL: 0.6 M/S
E WAVE DECELERATION TIME: 215 MS
FRACTIONAL SHORTENING: 31 (ref 28–44)
INTERVENTRICULAR SEPTUM IN DIASTOLE (PARASTERNAL SHORT AXIS VIEW): 1 CM
INTERVENTRICULAR SEPTUM: 1 CM (ref 0.6–1.1)
LAAS-AP2: 12.3 CM2
LAAS-AP4: 10.7 CM2
LEFT ATRIUM AREA SYSTOLE SINGLE PLANE A4C: 10.1 CM2
LEFT ATRIUM SIZE: 2.8 CM
LEFT INTERNAL DIMENSION IN SYSTOLE: 2.5 CM (ref 2.1–4)
LEFT VENTRICULAR INTERNAL DIMENSION IN DIASTOLE: 3.6 CM (ref 3.5–6)
LEFT VENTRICULAR POSTERIOR WALL IN END DIASTOLE: 0.7 CM
LEFT VENTRICULAR STROKE VOLUME: 31 ML
LVSV (TEICH): 31 ML
MV E'TISSUE VEL-SEP: 9 CM/S
MV PEAK A VEL: 0.9 M/S
MV PEAK E VEL: 72 CM/S
MV STENOSIS PRESSURE HALF TIME: 62 MS
MV VALVE AREA P 1/2 METHOD: 3.55
RIGHT ATRIAL 2D VOLUME: 26 ML
RIGHT ATRIUM AREA SYSTOLE A4C: 11.5 CM2
RIGHT VENTRICLE ID DIMENSION: 2.4 CM
SL CV LEFT ATRIUM LENGTH A2C: 4.3 CM
SL CV LV EF: 65
SL CV PED ECHO LEFT VENTRICLE DIASTOLIC VOLUME (MOD BIPLANE) 2D: 53 ML
SL CV PED ECHO LEFT VENTRICLE SYSTOLIC VOLUME (MOD BIPLANE) 2D: 23 ML
TR MAX PG: 27 MMHG
TR PEAK VELOCITY: 2.6 M/S
TRICUSPID VALVE PEAK REGURGITATION VELOCITY: 2.59 M/S

## 2023-01-14 DIAGNOSIS — J44.9 CHRONIC OBSTRUCTIVE PULMONARY DISEASE, UNSPECIFIED COPD TYPE (HCC): Chronic | ICD-10-CM

## 2023-01-16 RX ORDER — ALBUTEROL SULFATE 90 UG/1
AEROSOL, METERED RESPIRATORY (INHALATION)
Qty: 18 G | Refills: 5 | Status: SHIPPED | OUTPATIENT
Start: 2023-01-16

## 2023-01-27 ENCOUNTER — OFFICE VISIT (OUTPATIENT)
Dept: PULMONOLOGY | Facility: CLINIC | Age: 73
End: 2023-01-27

## 2023-01-27 VITALS
TEMPERATURE: 97.7 F | WEIGHT: 156 LBS | HEIGHT: 57 IN | HEART RATE: 91 BPM | SYSTOLIC BLOOD PRESSURE: 140 MMHG | RESPIRATION RATE: 18 BRPM | DIASTOLIC BLOOD PRESSURE: 82 MMHG | OXYGEN SATURATION: 92 % | BODY MASS INDEX: 33.66 KG/M2

## 2023-01-27 DIAGNOSIS — F17.201 TOBACCO ABUSE, IN REMISSION: ICD-10-CM

## 2023-01-27 DIAGNOSIS — R00.0 TACHYCARDIA: Primary | ICD-10-CM

## 2023-01-27 DIAGNOSIS — J96.11 CHRONIC HYPOXEMIC RESPIRATORY FAILURE (HCC): ICD-10-CM

## 2023-01-27 DIAGNOSIS — J41.0 SIMPLE CHRONIC BRONCHITIS (HCC): ICD-10-CM

## 2023-01-27 DIAGNOSIS — R91.1 LUNG NODULE: ICD-10-CM

## 2023-01-27 DIAGNOSIS — R06.09 DYSPNEA ON EXERTION: ICD-10-CM

## 2023-01-27 RX ORDER — IBUPROFEN 600 MG/1
600 TABLET ORAL 3 TIMES DAILY PRN
COMMUNITY
Start: 2022-12-14

## 2023-01-27 NOTE — PROGRESS NOTES
Progress Note - Pulmonary   Macrina Reagin 67 y o  female MRN: [id number]   Encounter: [id number]      Assessment/Plan:  Patient is a 67 female with past medical history significant for history of nicotine dependence, severe COPD, chronic hypoxemic respiratory failure who presents for routine follow-up  We reviewed in depth the lung cancer screening CT scan which showed stable nodules as well as the echocardiogram which had only trace to mild valvular dysfunction not accounting for her degree of shortness of breath  A long discussion was held and the patient does report that she becomes tachycardic with relatively little exertion  It is unclear if this is related to an underlying arrhythmia versus deconditioning  We will check extended Holter monitor to look for arrhythmia, if not will proceed with more aggressive exercise regimen  Tachycardia  -  patient tachycardia noted on oxygen titration study  -  but will consider checking EKG at next visit  -  Monitor at home              -  With both rest and exertion  -  No significant findings on echo     Severe COPD  -  does not qualify for EBV at this time given low RV  -  continue Trelegy; albuterol p r n               -  [facility]W provided; encourage use with albuterol HFA     Chronic hypoxemic respiratory failure  -  Currently using 2L w/ exertion and occasionally to sleep  -  Encouraged to use supplemental oxygen more frequently with exertion     Lung nodules  -  Stable  -  Continue annual LDCT  -  Next due 1/2024    Dyspnea  -  Related to COPD vs arrhythmia  -  encouraged increased exercise  -  Will further evaluate tachycardia     Tobacco abuse  -  Quit 2017  -  Qualifies for lung cancer screening   -  Next 1/2024    1  Tachycardia  -     AMB extended holter monitor; Future; Expected date: 01/27/2023    2  Simple chronic bronchitis (HCC)    3  Lung nodule    4  Tobacco abuse, in remission    5  Dyspnea on exertion    6   Chronic hypoxemic respiratory failure [facility])      Patient may follow up in 2 months or sooner as necessary  Orders:  Orders Placed This Encounter   Procedures   • AMB extended holter monitor     Standing Status:   Future     Standing Expiration Date:   1/27/2027     Order Specific Question:   How long will patient wear device? Answer:   1 week     Order Specific Question:   Reason for Exam:     Answer:   tachycardia, dyspnea       Subjective: The patient and her  had Viral in early November  At rest, her heart rate is in the 80-90s, with exertion in climbs into the 90s  She will notice a rapid climb in her heart rate with activity  There is associated decrease in oxygenation  She denies fevers chills nausea or vomiting  She is compliant with her inhaler regimen  The patient is active at home with doing activities of daily living  She does not do any specific exercise though      Answers for HPI/ROS submitted by the patient on 1/20/2023  Do you have a cough?: Yes  Do you have shortness of breath?: Yes  Chronicity: recurrent  When did you first notice your symptoms?: more than 1 year ago  How often do your symptoms occur?: daily  Since you first noticed this problem, how has it changed?: unchanged  Have you had a change in appetite?: No  Do you have chest pain?: No  Do you have shortness of breath that occurs with effort or exertion?: Yes  Do you have ear congestion?: No  Do you have ear pain?: No  Do you have a fever?: No  Do you have headaches?: No  Do you have heartburn?: No  Do you have fatigue?: No  Do you have muscle pain?: No  Do you have nasal congestion?: No  Do you have shortness of breath when lying flat?: No  Do you have shortness of breath when you wake up?: Yes  Do you have post-nasal drip?: No  Do you have a runny nose?: No  Do you have sneezing?: Yes  Do you have a sore throat?: Yes  Do you have sweats?: Yes  Do you have trouble swallowing?: Yes  Have you experienced weight loss?: Yes  Which of the following makes your symptoms worse?: any activity, change in weather, climbing stairs, exercise, minimal activity, pollen, strenuous activity  Which of the following makes your symptoms better?: nothing    Inhaler Regimen:  Trelegy - daily  Albuterol HFA 3-5x/daily  Albuterol nebulizer - 2x/daily    Remainder of review of systems negative except as described in HPI  The following portions of the patient's history were reviewed and updated as appropriate: allergies, current medications, past family history, past medical history, past social history, past surgical history and problem list      Objective:   Vitals: Blood pressure 140/82, pulse 91, temperature 97 7 °F (36 5 °C), temperature source Tympanic, resp  rate 18, height 4' 9" (1 448 m), weight 70 8 kg (156 lb), SpO2 92 %  , 2LNC, Body mass index is 33 76 kg/m²  Physical Exam  Gen: Pleasant, awake, alert, oriented x 3, no acute distress  HEENT: Mucous membranes moist, no oral lesions, no thrush  NECK: No accessory muscle use, JVP not elevated  Cardiac: Tachycardic, single S1, single S2, no murmurs, no rubs, no gallops  Lungs: CTA b/l; no w/r/c  Abdomen: normoactive bowel sounds, soft nontender, nondistended, no rebound or rigidity, no guarding, obese  Extremities: no cyanosis, no clubbing, no LE edema  MSK:  Strength equal in all extremities  Derm:  No rashes/lesions noted  Neuro:  Appropriate mood/affect     Labs: I have personally reviewed pertinent lab results  Lab Results   Component Value Date    WBC 6 4 09/26/2018    WBC 9 57 09/11/2017    HGB 13 5 09/26/2018    HGB 10 8 (L) 09/11/2017     09/26/2018     09/11/2017     Lab Results   Component Value Date    CREATININE 0 86 10/26/2022     Imaging and other studies: I have personally reviewed pertinent reports  and I have personally reviewed pertinent films in PACS  Lung screening CT 1/6/2023  My interpretation:  Mild emphysema; stable nodules    Radiology findings:  LUNGS:  Mild emphysema    -3 mm right upper lobe nodule (3/51), stable   -2 mm right middle lobe nodule (3/59), stable  -3 mm right middle lobe nodule (3/70), stable  -4-5 mm right lower lobe nodule (3/84), stable   -2 mm left upper lobe nodule (3/45), stable   -2 mm left lower lobe nodule (3/88), stable  A few additional 2-3 mm left upper and lower lobe nodules best depicted on series 603 are also unchanged  No new nodules  Tiny right upper lobe calcified granuloma  No endobronchial lesions  PLEURA:  Unremarkable  HEART/GREAT VESSELS: Heart is unremarkable for patient's age  Ascending aorta is top normal caliber at 4 cm  Atherosclerotic changes thoracic aorta and coronary arteries  MEDIASTINUM AND TAYLER:  Unremarkable      Pulmonary Function Testing: I have personally reviewed pertinent reports  and I have personally reviewed pertinent films in PACS  7/7/2021:  Severe obstruction with severe diffusion defect  FEV1/FVC Ratio: 49 %  Forced Vital Capacity: 1 65 L    70 % predicted  FEV1: 0 80 L     40 % predicted     Lung volumes by body plethysmography:   Total Lung Capacity 83 % predicted   Residual volume 110 % predicted     DLCO corrected for patients hemoglobin level: 25 %    ECHO:  1/6/2023:  Left Ventricle: Left ventricular cavity size is normal  Wall thickness is normal  The left ventricular ejection fraction is 65%  Systolic function is normal  Wall motion is normal  Diastolic function is normal   •  Aortic Valve: There is trace regurgitation  •  Mitral Valve: There is mild regurgitation  •  Tricuspid Valve: There is trace regurgitation  •  Pulmonic Valve: There is mild regurgitation  Arleth [facility]

## 2023-02-20 ENCOUNTER — HOSPITAL ENCOUNTER (OUTPATIENT)
Dept: MAMMOGRAPHY | Facility: HOSPITAL | Age: 73
Discharge: HOME | End: 2023-02-20

## 2023-02-20 VITALS — BODY MASS INDEX: 33.67 KG/M2 | HEIGHT: 57 IN | WEIGHT: 156.09 LBS

## 2023-02-20 DIAGNOSIS — Z12.31 VISIT FOR SCREENING MAMMOGRAM: ICD-10-CM

## 2023-02-23 ENCOUNTER — TELEPHONE (OUTPATIENT)
Dept: FAMILY MEDICINE CLINIC | Facility: CLINIC | Age: 73
End: 2023-02-23

## 2023-02-23 NOTE — TELEPHONE ENCOUNTER
----- Message from Yoana Starks MD sent at 2/23/2023 12:54 PM EST -----  Please call patient with normal results

## 2023-04-26 ENCOUNTER — APPOINTMENT (OUTPATIENT)
Dept: LAB | Facility: CLINIC | Age: 73
End: 2023-04-26

## 2023-04-26 DIAGNOSIS — E11.9 TYPE 2 DIABETES MELLITUS WITHOUT COMPLICATION, WITHOUT LONG-TERM CURRENT USE OF INSULIN (HCC): Chronic | ICD-10-CM

## 2023-04-26 DIAGNOSIS — E78.5 HYPERLIPIDEMIA, UNSPECIFIED HYPERLIPIDEMIA TYPE: Chronic | ICD-10-CM

## 2023-04-26 LAB
ALBUMIN SERPL BCP-MCNC: 3.8 G/DL (ref 3.5–5)
ALP SERPL-CCNC: 87 U/L (ref 46–116)
ALT SERPL W P-5'-P-CCNC: 24 U/L (ref 12–78)
ANION GAP SERPL CALCULATED.3IONS-SCNC: 3 MMOL/L (ref 4–13)
AST SERPL W P-5'-P-CCNC: 18 U/L (ref 5–45)
BILIRUB SERPL-MCNC: 0.56 MG/DL (ref 0.2–1)
BUN SERPL-MCNC: 17 MG/DL (ref 5–25)
CALCIUM SERPL-MCNC: 9.4 MG/DL (ref 8.3–10.1)
CHLORIDE SERPL-SCNC: 106 MMOL/L (ref 96–108)
CHOLEST SERPL-MCNC: 180 MG/DL
CO2 SERPL-SCNC: 27 MMOL/L (ref 21–32)
CREAT SERPL-MCNC: 0.88 MG/DL (ref 0.6–1.3)
GFR SERPL CREATININE-BSD FRML MDRD: 65 ML/MIN/1.73SQ M
GLUCOSE P FAST SERPL-MCNC: 113 MG/DL (ref 65–99)
HDLC SERPL-MCNC: 63 MG/DL
LDLC SERPL CALC-MCNC: 74 MG/DL (ref 0–100)
NONHDLC SERPL-MCNC: 117 MG/DL
POTASSIUM SERPL-SCNC: 4.2 MMOL/L (ref 3.5–5.3)
PROT SERPL-MCNC: 7.5 G/DL (ref 6.4–8.4)
SODIUM SERPL-SCNC: 136 MMOL/L (ref 135–147)
TRIGL SERPL-MCNC: 215 MG/DL

## 2023-04-28 ENCOUNTER — RA CDI HCC (OUTPATIENT)
Dept: OTHER | Facility: HOSPITAL | Age: 73
End: 2023-04-28

## 2023-04-30 LAB
EST. AVERAGE GLUCOSE BLD GHB EST-MCNC: 134 MG/DL
HBA1C MFR BLD: 6.3 %

## 2023-05-04 ENCOUNTER — OFFICE VISIT (OUTPATIENT)
Dept: FAMILY MEDICINE CLINIC | Facility: CLINIC | Age: 73
End: 2023-05-04

## 2023-05-04 VITALS
OXYGEN SATURATION: 95 % | SYSTOLIC BLOOD PRESSURE: 130 MMHG | DIASTOLIC BLOOD PRESSURE: 78 MMHG | BODY MASS INDEX: 32.66 KG/M2 | HEART RATE: 71 BPM | HEIGHT: 57 IN | WEIGHT: 151.4 LBS

## 2023-05-04 DIAGNOSIS — E78.2 MIXED HYPERLIPIDEMIA: ICD-10-CM

## 2023-05-04 DIAGNOSIS — E11.9 TYPE 2 DIABETES MELLITUS WITHOUT COMPLICATION, WITHOUT LONG-TERM CURRENT USE OF INSULIN (HCC): Primary | ICD-10-CM

## 2023-05-04 DIAGNOSIS — Z23 NEED FOR VACCINATION: ICD-10-CM

## 2023-05-04 DIAGNOSIS — J43.2 CENTRILOBULAR EMPHYSEMA (HCC): Chronic | ICD-10-CM

## 2023-05-04 DIAGNOSIS — E78.5 HYPERLIPIDEMIA, UNSPECIFIED HYPERLIPIDEMIA TYPE: Chronic | ICD-10-CM

## 2023-05-04 LAB
LEFT EYE DIABETIC RETINOPATHY: NORMAL
LEFT EYE IMAGE QUALITY: NORMAL
LEFT EYE MACULAR EDEMA: NORMAL
LEFT EYE OTHER RETINOPATHY: NORMAL
RIGHT EYE DIABETIC RETINOPATHY: NORMAL
RIGHT EYE IMAGE QUALITY: NORMAL
RIGHT EYE MACULAR EDEMA: NORMAL
RIGHT EYE OTHER RETINOPATHY: NORMAL
SEVERITY (EYE EXAM): NORMAL

## 2023-05-04 NOTE — ASSESSMENT & PLAN NOTE
Lab Results   Component Value Date    HGBA1C 6 3 (H) 04/26/2023   A1c stable  Continue metformin 500 milligram daily  Continue monitoring  Renal function stable

## 2023-05-04 NOTE — PROGRESS NOTES
Subjective:      Patient ID: Travis Molina is a 67 y o  female  Diabetes  She presents for her follow-up diabetic visit  She has type 2 diabetes mellitus  Her disease course has been stable  There are no hypoglycemic associated symptoms  There are no diabetic associated symptoms  Symptoms are stable  Risk factors for coronary artery disease include diabetes mellitus, dyslipidemia and post-menopausal  Current diabetic treatment includes oral agent (monotherapy)  She is compliant with treatment all of the time  She is following a generally healthy diet  Hyperlipidemia  This is a chronic problem  The current episode started more than 1 year ago  The problem is controlled  Recent lipid tests were reviewed and are normal (TGL- Borderline)  Current antihyperlipidemic treatment includes statins  The current treatment provides significant improvement of lipids  There are no compliance problems  Past Medical History:   Diagnosis Date    [facility]ch[facility] )     Diabetes mellitus ([facility] )     Hyperlipidemia     Pulmonary nodule 8/31/2017       Family History   Problem Relation Age of Onset    Liver disease Mother     Substance Abuse Father     COPD Sister     Diabetes Sister     No Known Problems Sister     No Known Problems Daughter     No Known Problems Daughter     No Known Problems Son        Past Surgical History:   Procedure Laterality Date    HAND SURGERY      TENDON TRANSFER      Left lower arm        reports that she quit smoking about 6 years ago  Her smoking use included cigarettes  She started smoking about 53 years ago  She has a 47 00 pack-year smoking history  She has never used smokeless tobacco  She reports that she does not drink alcohol and does not use drugs        Current Outpatient Medications:     albuterol (PROVENTIL HFA,VENTOLIN HFA) 90 mcg/act inhaler, INHALE 1 PUFF EVERY 4 HOURS AS NEEDED FOR WHEEZING , Disp: 18 g, Rfl: 5    Calcium 500-100 "MG-UNIT CHEW, Chew 1 tablet daily, Disp: , Rfl:     fluticasone-umeclidinium-vilanterol (TRELEGY) 100-62 5-25 MCG/INH inhaler, Inhale 1 puff daily Rinse mouth after use , Disp: 1 each, Rfl: 5    levalbuterol (XOPENEX) 1 25 mg/3 mL nebulizer solution, TAKE 1 VIAL BY NEBULIZATION EVERY 8 (EIGHT) HOURS AS NEEDED FOR WHEEZING OR SHORTNESS OF BREATH, Disp: 180 mL, Rfl: 3    metFORMIN (GLUCOPHAGE) 500 mg tablet, Take 1 tablet (500 mg total) by mouth daily with breakfast, Disp: 90 tablet, Rfl: 1    simvastatin (ZOCOR) 20 mg tablet, Take 1 tablet (20 mg total) by mouth daily at bedtime, Disp: 90 tablet, Rfl: 1    ibuprofen (MOTRIN) 600 mg tablet, Take 600 mg by mouth 3 (three) times a day as needed (Patient not taking: Reported on 5/4/2023), Disp: , Rfl:     The following portions of the patient's history were reviewed and updated as appropriate: allergies, current medications, past family history, past medical history, past social history, past surgical history and problem list     Review of Systems   Constitutional: Negative  Respiratory: Negative  Cardiovascular: Negative  Objective:    /78   Pulse 71   Ht 4' 9\" (1 448 m)   Wt 68 7 kg (151 lb 6 4 oz)   SpO2 95%   BMI 32 76 kg/m²      Physical Exam  Constitutional:       Appearance: Normal appearance  Cardiovascular:      Pulses: no weak pulses     Heart sounds: Normal heart sounds  Pulmonary:      Breath sounds: Normal breath sounds  Musculoskeletal:      Right lower leg: No edema  Left lower leg: No edema  Feet:      Right foot:      Skin integrity: No ulcer, skin breakdown, erythema, warmth, callus or dry skin  Left foot:      Skin integrity: No ulcer, skin breakdown, erythema, warmth, callus or dry skin  Neurological:      Mental Status: She is oriented to person, place, and time  Psychiatric:         Mood and Affect: Mood normal            Patient's shoes and socks removed      Right Foot/Ankle   Right Foot " Inspection  Skin Exam: skin normal and skin intact  No dry skin, no warmth, no callus, no erythema, no maceration, no abnormal color, no pre-ulcer, no ulcer and no callus  Toe Exam: ROM and strength within normal limits  Sensory   Vibration: intact  Proprioception: intact  Monofilament testing: intact    Vascular  Capillary refills: < 3 seconds          Left Foot/Ankle  Left Foot Inspection  Skin Exam: skin normal and skin intact  No dry skin, no warmth, no erythema, no maceration, normal color, no pre-ulcer, no ulcer and no callus  Toe Exam: ROM and strength within normal limits  Sensory   Vibration: intact  Proprioception: intact  Monofilament testing: intact    Vascular  Capillary refills: < 3 seconds        Assign Risk Category  No deformity present  No loss of protective sensation  No weak pulses  Risk: 0        Recent Results (from the past 1008 hour(s))   Comprehensive metabolic panel    Collection Time: 04/26/23 11:16 AM   Result Value Ref Range    Sodium 136 135 - 147 mmol/L    Potassium 4 2 3 5 - 5 3 mmol/L    Chloride 106 96 - 108 mmol/L    CO2 27 21 - 32 mmol/L    ANION GAP 3 (L) 4 - 13 mmol/L    BUN 17 5 - 25 mg/dL    Creatinine 0 88 0 60 - 1 30 mg/dL    Glucose, Fasting 113 (H) 65 - 99 mg/dL    Calcium 9 4 8 3 - 10 1 mg/dL    AST 18 5 - 45 U/L    ALT 24 12 - 78 U/L    Alkaline Phosphatase 87 46 - 116 U/L    Total Protein 7 5 6 4 - 8 4 g/dL    Albumin 3 8 3 5 - 5 0 g/dL    Total Bilirubin 0 56 0 20 - 1 00 mg/dL    eGFR 65 ml/min/1 73sq m   Hemoglobin A1C    Collection Time: 04/26/23 11:16 AM   Result Value Ref Range    Hemoglobin A1C 6 3 (H) Normal 3 8-5 6%; PreDiabetic 5 7-6 4%;  Diabetic >=6 5%; Glycemic control for adults with diabetes <7 0% %     mg/dl   Lipid panel    Collection Time: 04/26/23 11:16 AM   Result Value Ref Range    Cholesterol 180 See Comment mg/dL    Triglycerides 215 (H) See Comment mg/dL    HDL, Direct 63 >=50 mg/dL    LDL Calculated 74 0 - 100 mg/dL Non-HDL-Chol (CHOL-HDL) 117 mg/dl       Assessment/Plan:    No problem-specific Assessment & Plan notes found for this encounter  Problem List Items Addressed This Visit        Endocrine    Type 2 diabetes mellitus without complication, without long-term current use of insulin ([facility] ) - Primary (Chronic)       Lab Results   Component Value Date    HGBA1C 6 3 (H) 04/26/2023   A1c stable  Continue metformin 500 milligram daily  Continue monitoring  Renal function stable  Relevant Orders    IRIS Diabetic eye exam    Comprehensive metabolic panel    Hemoglobin A1C    Microalbumin / creatinine urine ratio       Respiratory    COPD (chronic obstructive pulmonary disease) (HCC) (Chronic)     Stable  Continue trelegy per Pulmonary  Other    HLD (hyperlipidemia) (Chronic)     LDL is at goal   Continue simvastatin 20 milligram daily  If triglycerides are borderline high  Patient advised to continue with low-fat diet  Continue monitoring           Relevant Orders    Lipid panel   Other Visit Diagnoses     Need for vaccination        Relevant Orders    Pneumococcal Conjugate Vaccine 20-valent (Pcv20) (Completed)

## 2023-05-14 DIAGNOSIS — J44.9 CHRONIC OBSTRUCTIVE PULMONARY DISEASE, UNSPECIFIED COPD TYPE (HCC): Chronic | ICD-10-CM

## 2023-05-15 RX ORDER — LEVALBUTEROL INHALATION SOLUTION 1.25 MG/3ML
SOLUTION RESPIRATORY (INHALATION)
Qty: 180 ML | Refills: 3 | Status: SHIPPED | OUTPATIENT
Start: 2023-05-15

## 2023-05-17 DIAGNOSIS — E11.9 TYPE 2 DIABETES MELLITUS WITHOUT COMPLICATION, WITHOUT LONG-TERM CURRENT USE OF INSULIN (HCC): Chronic | ICD-10-CM

## 2023-07-04 DIAGNOSIS — J44.9 CHRONIC OBSTRUCTIVE PULMONARY DISEASE, UNSPECIFIED COPD TYPE (HCC): Chronic | ICD-10-CM

## 2023-07-05 RX ORDER — LEVALBUTEROL INHALATION SOLUTION 1.25 MG/3ML
SOLUTION RESPIRATORY (INHALATION)
Qty: 810 ML | Refills: 2 | Status: SHIPPED | OUTPATIENT
Start: 2023-07-05

## 2023-07-13 DIAGNOSIS — E78.5 HYPERLIPIDEMIA, UNSPECIFIED HYPERLIPIDEMIA TYPE: Chronic | ICD-10-CM

## 2023-07-13 RX ORDER — SIMVASTATIN 20 MG
TABLET ORAL
Qty: 90 TABLET | Refills: 1 | Status: SHIPPED | OUTPATIENT
Start: 2023-07-13

## 2023-09-03 NOTE — TELEPHONE ENCOUNTER
Medication refill called in  Please inform the patient  Also that the office did not receive any refill request from the pharmacy yet  She needs to call the office directly when she needs refill  This document is complete and the patient is ready for discharge.

## 2023-10-09 ENCOUNTER — OFFICE VISIT (OUTPATIENT)
Dept: PULMONOLOGY | Facility: CLINIC | Age: 73
End: 2023-10-09
Payer: MEDICARE

## 2023-10-09 VITALS
WEIGHT: 150 LBS | SYSTOLIC BLOOD PRESSURE: 128 MMHG | DIASTOLIC BLOOD PRESSURE: 82 MMHG | OXYGEN SATURATION: 96 % | HEIGHT: 57 IN | BODY MASS INDEX: 32.36 KG/M2 | TEMPERATURE: 98.2 F | HEART RATE: 104 BPM | RESPIRATION RATE: 18 BRPM

## 2023-10-09 DIAGNOSIS — F17.201 TOBACCO ABUSE, IN REMISSION: Primary | ICD-10-CM

## 2023-10-09 DIAGNOSIS — J96.11 CHRONIC HYPOXEMIC RESPIRATORY FAILURE (HCC): ICD-10-CM

## 2023-10-09 DIAGNOSIS — J44.9 CHRONIC OBSTRUCTIVE PULMONARY DISEASE, UNSPECIFIED COPD TYPE (HCC): Chronic | ICD-10-CM

## 2023-10-09 DIAGNOSIS — J44.1 CHRONIC OBSTRUCTIVE PULMONARY DISEASE WITH ACUTE EXACERBATION (HCC): Chronic | ICD-10-CM

## 2023-10-09 PROCEDURE — 99214 OFFICE O/P EST MOD 30 MIN: CPT | Performed by: INTERNAL MEDICINE

## 2023-10-09 RX ORDER — FLUTICASONE FUROATE, UMECLIDINIUM BROMIDE AND VILANTEROL TRIFENATATE 100; 62.5; 25 UG/1; UG/1; UG/1
1 POWDER RESPIRATORY (INHALATION) DAILY
Qty: 3 EACH | Refills: 3 | Status: SHIPPED | OUTPATIENT
Start: 2023-10-09

## 2023-10-09 RX ORDER — ALBUTEROL SULFATE 90 UG/1
2 AEROSOL, METERED RESPIRATORY (INHALATION) EVERY 4 HOURS PRN
Qty: 18 G | Refills: 5 | Status: SHIPPED | OUTPATIENT
Start: 2023-10-09

## 2023-10-09 NOTE — PROGRESS NOTES
Progress Note - Pulmonary   Navarro Thompson 67 y.o. female MRN: [id number]   Encounter: [id number]      Assessment/Plan:  Patient is a 41-year-old female with past medical history significant for nicotine dependence in remission who presents for routine follow-up. The patient does have significant shortness of breath with exertion. The patient does become tachycardic but is unclear if this is more related to deconditioning with appropriate heart rate compensation versus a an arrhythmia. Encouraged the patient to monitor her heart rate and oxygen saturation at home. She is not using her oxygen much, encouraged her to do this with exertion to allow for longer periods of time of activity. The patient reported her grandson is getting  in approximately 1 year. Encouraged her to dance around the house to get ready for the wedding. Tachycardia  -  still notes periods of tachycardia   -  Likely appropriate compensation but maybe pathologic  -  Monitor at home              -  With both rest and exertion  -  No significant findings on echo     Severe COPD  -  does not qualify for EBV at this time given low RV  -  continue Trelegy; albuterol p.r.n.  -  No evidence of acute exacerbation     Chronic hypoxemic respiratory failure  -  Currently using 2L w/ exertion and occasionally to sleep  -  Encouraged to use supplemental oxygen more frequently with exertion     Lung nodules  -  Stable  -  Continue annual LDCT  -  Next due 1/2024     Dyspnea  -  Related to COPD vs arrhythmia  -  encouraged increased exercise  -  Will further evaluate tachycardia     Tobacco abuse  -  Quit 2017  -  Qualifies for lung cancer screening              -  Next 1/2024    1. Tobacco abuse, in remission    2. Chronic obstructive pulmonary disease with acute exacerbation (HCC)  -     fluticasone-umeclidinium-vilanterol (Trelegy Ellipta) 100-62.5-25 mcg/actuation inhaler; Inhale 1 puff daily Rinse mouth after use.     3. Chronic obstructive pulmonary disease, unspecified COPD type (HCC)  -     albuterol (PROVENTIL HFA,VENTOLIN HFA) 90 mcg/act inhaler; Inhale 2 puffs every 4 (four) hours as needed for wheezing    4. Chronic hypoxemic respiratory failure ([address])        Patient may follow up in 3 months or sooner as necessary. Orders:  No orders of the defined types were placed in this encounter. Subjective: The patient reports she is about the same. She is overall stable. She reports her heart rate is normally in the low 80s but will increase with exertion. She will note her oxygen level is the high 80s at the lowest. She notes she recovers quickly. She does not feel much benefit from the albuterol. She has pulsed flow with portable oxygen concentrator which she has been using more. Inhaler Regimen:  Trelegy - daily  Albuterol HFA 3-5x/daily  Albuterol nebulizer - 2x/daily    Answers for HPI/ROS submitted by the patient on 10/2/2023  Chronicity: new  Have you had a change in appetite?: No  Do you have chest pain?: No  Do you have shortness of breath that occurs with effort or exertion?: No  Do you have ear congestion?: No  Do you have ear pain?: No  Do you have a fever?: No  Do you have headaches?: No  Do you have heartburn?: No  Do you have fatigue?: No  Do you have muscle pain?: No  Do you have nasal congestion?: No  Do you have shortness of breath when lying flat?: No  Do you have shortness of breath when you wake up?: Yes  Do you have post-nasal drip?: No  Do you have a runny nose?: No  Do you have sneezing?: No  Do you have a sore throat?: No  Do you have sweats?: No  Do you have trouble swallowing?: No  Have you experienced weight loss?: No  Which of the following makes your symptoms worse?: any activity, climbing stairs, exercise, minimal activity  Which of the following makes your symptoms better?: steroid inhaler    Remainder of review of systems negative except as described in HPI.       The following portions of the patient's history were reviewed and updated as appropriate: allergies, current medications, past family history, past medical history, past social history, past surgical history and problem list.     Objective:   Vitals: Blood pressure 128/82, pulse 104, temperature 98.2 °F (36.8 °C), resp. rate 18, height 4' 9" (1.448 m), weight 68 kg (150 lb), SpO2 96 %., RA, Body mass index is 32.46 kg/m². Physical Exam  Gen: Pleasant, awake, alert, oriented x 3, no acute distress  HEENT: Mucous membranes moist, no oral lesions, no thrush  NECK: No accessory muscle use, JVP not elevated  Cardiac: RRR, single S1, single S2, no murmurs, no rubs, no gallops  Lungs: CTA b/l  Abdomen: normoactive bowel sounds, soft nontender, nondistended, no rebound or rigidity, no guarding, obese  Extremities: no cyanosis, no clubbing, no LE edema  MSK:  Strength equal in all extremities  Derm:  No rashes/lesions noted  Neuro:  Appropriate mood/affect    Labs: I have personally reviewed pertinent lab results. Lab Results   Component Value Date    WBC 6.4 09/26/2018    WBC 9.57 09/11/2017    HGB 13.5 09/26/2018    HGB 10.8 (L) 09/11/2017     09/26/2018     09/11/2017     Lab Results   Component Value Date    CREATININE 0.88 04/26/2023        Imaging and other studies: I have personally reviewed pertinent reports. and I have personally reviewed pertinent films in PACS  CT lung screening 1/6/2023  My interpretation:  Stable nodules; emphysema    Radiology findings:  LUNGS:  Mild emphysema. -3 mm right upper lobe nodule (3/51), stable.  -2 mm right middle lobe nodule (3/59), stable. -3 mm right middle lobe nodule (3/70), stable. -4-5 mm right lower lobe nodule (3/84), stable.  -2 mm left upper lobe nodule (3/45), stable.  -2 mm left lower lobe nodule (3/88), stable. A few additional 2-3 mm left upper and lower lobe nodules best depicted on series 603 are also unchanged. No new nodules.   Tiny right upper lobe calcified granuloma. No endobronchial lesions. PLEURA:  Unremarkable. HEART/GREAT VESSELS: Heart is unremarkable for patient's age. Ascending aorta is top normal caliber at 4 cm. Atherosclerotic changes thoracic aorta and coronary arteries. MEDIASTINUM AND TAYLER:  Unremarkable. CHEST WALL AND LOWER NECK:  Unremarkable. Pulmonary Function Testing: I have personally reviewed pertinent reports. and I have personally reviewed pertinent films in PACS  7/7/2021:  Severe obstruction with severe diffusion defect  FEV1/FVC Ratio: 49 %  Forced Vital Capacity: 1.65 L    70 % predicted  FEV1: 0.80 L     40 % predicted     Lung volumes by body plethysmography:   Total Lung Capacity 83 % predicted   Residual volume 110 % predicted     DLCO corrected for patients hemoglobin level: 25 %    Stiven Gregorio Mojoyces, [address] [place] [address]

## 2023-10-31 ENCOUNTER — APPOINTMENT (OUTPATIENT)
Dept: LAB | Facility: CLINIC | Age: 73
End: 2023-10-31
Payer: MEDICARE

## 2023-10-31 DIAGNOSIS — E78.2 MIXED HYPERLIPIDEMIA: ICD-10-CM

## 2023-10-31 DIAGNOSIS — E11.9 TYPE 2 DIABETES MELLITUS WITHOUT COMPLICATION, WITHOUT LONG-TERM CURRENT USE OF INSULIN (HCC): ICD-10-CM

## 2023-10-31 LAB
ALBUMIN SERPL BCP-MCNC: 4.3 G/DL (ref 3.5–5)
ALP SERPL-CCNC: 71 U/L (ref 34–104)
ALT SERPL W P-5'-P-CCNC: 17 U/L (ref 7–52)
ANION GAP SERPL CALCULATED.3IONS-SCNC: 7 MMOL/L
AST SERPL W P-5'-P-CCNC: 18 U/L (ref 13–39)
BILIRUB SERPL-MCNC: 0.53 MG/DL (ref 0.2–1)
BUN SERPL-MCNC: 14 MG/DL (ref 5–25)
CALCIUM SERPL-MCNC: 9.1 MG/DL (ref 8.4–10.2)
CHLORIDE SERPL-SCNC: 103 MMOL/L (ref 96–108)
CHOLEST SERPL-MCNC: 177 MG/DL
CO2 SERPL-SCNC: 30 MMOL/L (ref 21–32)
CREAT SERPL-MCNC: 0.68 MG/DL (ref 0.6–1.3)
CREAT UR-MCNC: 66.5 MG/DL
EST. AVERAGE GLUCOSE BLD GHB EST-MCNC: 151 MG/DL
GFR SERPL CREATININE-BSD FRML MDRD: 87 ML/MIN/1.73SQ M
GLUCOSE P FAST SERPL-MCNC: 124 MG/DL (ref 65–99)
HBA1C MFR BLD: 6.9 %
HDLC SERPL-MCNC: 62 MG/DL
LDLC SERPL CALC-MCNC: 76 MG/DL (ref 0–100)
MICROALBUMIN UR-MCNC: <7 MG/L
MICROALBUMIN/CREAT 24H UR: <11 MG/G CREATININE (ref 0–30)
NONHDLC SERPL-MCNC: 115 MG/DL
POTASSIUM SERPL-SCNC: 4.3 MMOL/L (ref 3.5–5.3)
PROT SERPL-MCNC: 7.3 G/DL (ref 6.4–8.4)
SODIUM SERPL-SCNC: 140 MMOL/L (ref 135–147)
TRIGL SERPL-MCNC: 197 MG/DL

## 2023-10-31 PROCEDURE — 83036 HEMOGLOBIN GLYCOSYLATED A1C: CPT

## 2023-10-31 PROCEDURE — 80061 LIPID PANEL: CPT

## 2023-10-31 PROCEDURE — 36415 COLL VENOUS BLD VENIPUNCTURE: CPT

## 2023-10-31 PROCEDURE — 82570 ASSAY OF URINE CREATININE: CPT

## 2023-10-31 PROCEDURE — 82043 UR ALBUMIN QUANTITATIVE: CPT

## 2023-10-31 PROCEDURE — 80053 COMPREHEN METABOLIC PANEL: CPT

## 2023-11-09 ENCOUNTER — OFFICE VISIT (OUTPATIENT)
Dept: FAMILY MEDICINE CLINIC | Facility: CLINIC | Age: 73
End: 2023-11-09
Payer: MEDICARE

## 2023-11-09 VITALS
SYSTOLIC BLOOD PRESSURE: 130 MMHG | HEIGHT: 57 IN | HEART RATE: 82 BPM | BODY MASS INDEX: 32.58 KG/M2 | WEIGHT: 151 LBS | DIASTOLIC BLOOD PRESSURE: 74 MMHG | OXYGEN SATURATION: 96 %

## 2023-11-09 DIAGNOSIS — Z59.9 FINANCIAL DIFFICULTIES: ICD-10-CM

## 2023-11-09 DIAGNOSIS — Z12.31 VISIT FOR SCREENING MAMMOGRAM: ICD-10-CM

## 2023-11-09 DIAGNOSIS — Z00.00 MEDICARE ANNUAL WELLNESS VISIT, SUBSEQUENT: ICD-10-CM

## 2023-11-09 DIAGNOSIS — E78.5 HYPERLIPIDEMIA, UNSPECIFIED HYPERLIPIDEMIA TYPE: Chronic | ICD-10-CM

## 2023-11-09 DIAGNOSIS — E11.9 TYPE 2 DIABETES MELLITUS WITHOUT COMPLICATION, WITHOUT LONG-TERM CURRENT USE OF INSULIN (HCC): Primary | Chronic | ICD-10-CM

## 2023-11-09 PROBLEM — J96.11 CHRONIC HYPOXEMIC RESPIRATORY FAILURE (HCC): Status: RESOLVED | Noted: 2022-05-04 | Resolved: 2023-11-09

## 2023-11-09 PROCEDURE — 99214 OFFICE O/P EST MOD 30 MIN: CPT | Performed by: FAMILY MEDICINE

## 2023-11-09 PROCEDURE — G0439 PPPS, SUBSEQ VISIT: HCPCS | Performed by: FAMILY MEDICINE

## 2023-11-09 RX ORDER — SIMVASTATIN 20 MG
20 TABLET ORAL
Qty: 90 TABLET | Refills: 1 | Status: SHIPPED | OUTPATIENT
Start: 2023-11-09

## 2023-11-09 SDOH — ECONOMIC STABILITY - INCOME SECURITY: PROBLEM RELATED TO HOUSING AND ECONOMIC CIRCUMSTANCES, UNSPECIFIED: Z59.9

## 2023-11-09 NOTE — ASSESSMENT & PLAN NOTE
Lab Results   Component Value Date    HGBA1C 6.9 (H) 10/31/2023   A1c stable. Continue metformin 500 milligram daily. Continue monitoring. Renal function stable.

## 2023-11-09 NOTE — ASSESSMENT & PLAN NOTE
LDL is at goal.  Continue simvastatin 20 milligram daily. If triglycerides are borderline high. Patient advised to continue with low-fat diet. Continue monitoring.

## 2023-11-09 NOTE — PROGRESS NOTES
Assessment and Plan:     Problem List Items Addressed This Visit    None    1. Type 2 diabetes mellitus without complication, without long-term current use of insulin ([facility])  Assessment & Plan:    Lab Results   Component Value Date    HGBA1C 6.9 (H) 10/31/2023   A1c stable. Continue metformin 500 milligram daily. Continue monitoring. Renal function stable. Orders:  -     metFORMIN (GLUCOPHAGE) 500 mg tablet; Take 1 tablet (500 mg total) by mouth daily with breakfast  -     Comprehensive metabolic panel; Future; Expected date: 04/07/2024  -     Hemoglobin A1C; Future; Expected date: 04/07/2024    2. Hyperlipidemia, unspecified hyperlipidemia type  Assessment & Plan:  LDL is at goal.  Continue simvastatin 20 milligram daily. If triglycerides are borderline high. Patient advised to continue with low-fat diet. Continue monitoring. Orders:  -     simvastatin (ZOCOR) 20 mg tablet; Take 1 tablet (20 mg total) by mouth daily at bedtime  -     Lipid panel; Future; Expected date: 04/07/2024    3. Visit for screening mammogram  -     Mammo screening bilateral w 3d & cad; Future; Expected date: 02/20/2024    4. Financial difficulties    5. Medicare annual wellness visit, subsequent  Assessment & Plan:  UTD            BMI Counseling: Body mass index is 32.68 kg/m². The BMI is above normal. Nutrition recommendations include encouraging healthy choices of fruits and vegetables. Exercise recommendations include moderate physical activity 150 minutes/week and strength training exercises. Rationale for BMI follow-up plan is due to patient being overweight or obese. Depression Screening and Follow-up Plan: Patient was screened for depression during today's encounter. They screened negative with a PHQ-2 score of 0. Preventive health issues were discussed with patient, and age appropriate screening tests were ordered as noted in patient's After Visit Summary.   Personalized health advice and appropriate referrals for health education or preventive services given if needed, as noted in patient's After Visit Summary. History of Present Illness:     Patient presents for a Medicare Wellness Visit    HPI     Patient is here for routine 6-month follow-up. Has history of type 2 diabetes, dyslipidemia. A1c 6.9, LDL is at goal.  Patient is on low-dose metformin, simvastatin 20 mg. Tolerating medication without any side effects. Has history of dyspnea on exertion secondary to COPD, tolerating her inhalers well. Follows up with pulmonary routinely. Denies any symptoms of chest pain    Patient Care Team:  Jhonatan Diaz MD as PCP - General (Family Medicine)  Eduardo Lopez MD     Review of Systems:     Review of Systems   Constitutional: Negative. Cardiovascular: Negative.          Problem List:     Patient Active Problem List   Diagnosis    Dyspnea    COPD (chronic obstructive pulmonary disease) (HCC)    Type 2 diabetes mellitus without complication, without long-term current use of insulin (HCC)    HLD (hyperlipidemia)    Lung nodule    Musculoskeletal neck pain    Need for pneumococcal vaccination    Tobacco abuse, in remission    Chronic tachycardia    BMI 28.0-28.9,adult    Chronic hypoxemic respiratory failure (HCC)      Past Medical and Surgical History:     Past Medical History:   Diagnosis Date    COPD (chronic obstructive pulmonary disease) ([address])     Diabetes mellitus ([address])     Hyperlipidemia     Pulmonary nodule 8/31/2017     Past Surgical History:   Procedure Laterality Date    HAND SURGERY      TENDON TRANSFER      Left lower arm      Family History:     Family History   Problem Relation Age of Onset    Liver disease Mother     Substance Abuse Father     COPD Sister     Diabetes Sister     No Known Problems Sister     No Known Problems Daughter     No Known Problems Daughter     No Known Problems Son       Social History:     Social History     Socioeconomic History    Marital status: /Civil Union Spouse name: Not on file    Number of children: Not on file    Years of education: Not on file    Highest education level: Not on file   Occupational History    Not on file   Tobacco Use    Smoking status: Former     Packs/day: 1.00     Years: 47.00     Total pack years: 47.00     Types: Cigarettes     Start date: 5     Quit date: 2017     Years since quittin.8    Smokeless tobacco: Never   Vaping Use    Vaping Use: Never used   Substance and Sexual Activity    Alcohol use: No    Drug use: No    Sexual activity: Not on file   Other Topics Concern    Not on file   Social History Narrative    Most recent tobacco use screenin2017     Social Determinants of Health     Financial Resource Strain: Medium Risk (2023)    Overall Financial Resource Strain (CARDIA)     Difficulty of Paying Living Expenses: Somewhat hard   Food Insecurity: Not on file   Transportation Needs: No Transportation Needs (2023)    PRAPARE - Transportation     Lack of Transportation (Medical): No     Lack of Transportation (Non-Medical): No   Physical Activity: Not on file   Stress: Not on file   Social Connections: Not on file   Intimate Partner Violence: Not on file   Housing Stability: Not on file      Medications and Allergies:     Current Outpatient Medications   Medication Sig Dispense Refill    albuterol (PROVENTIL HFA,VENTOLIN HFA) 90 mcg/act inhaler Inhale 2 puffs every 4 (four) hours as needed for wheezing 18 g 5    Calcium 500-100 MG-UNIT CHEW Chew 1 tablet daily      fluticasone-umeclidinium-vilanterol (Trelegy Ellipta) 100-62.5-25 mcg/actuation inhaler Inhale 1 puff daily Rinse mouth after use.  3 each 3    ibuprofen (MOTRIN) 600 mg tablet Take 600 mg by mouth 3 (three) times a day as needed (Patient not taking: Reported on 2023)      levalbuterol (XOPENEX) 1.25 mg/3 mL nebulizer solution TAKE 1 VIAL BY NEBULIZATION EVERY 8 (EIGHT) HOURS AS NEEDED FOR WHEEZING OR SHORTNESS OF BREATH 810 mL 2    metFORMIN (GLUCOPHAGE) 500 mg tablet TAKE 1 TABLET BY MOUTH EVERY DAY WITH BREAKFAST 90 tablet 1    simvastatin (ZOCOR) 20 mg tablet TAKE 1 TABLET BY MOUTH DAILY AT BEDTIME 90 tablet 1     No current facility-administered medications for this visit. Allergies   Allergen Reactions    Prednisone      Facial redness and sob    Medrol [Methylprednisolone] Angioedema      Immunizations:     Immunization History   Administered Date(s) Administered    COVID-19 MODERNA VACC 0.5 ML IM 02/12/2021, 03/12/2021, 11/02/2021    COVID-19 Moderna Vac BIVALENT 12 Yr+ IM 0.5 ML 09/30/2022    INFLUENZA 10/01/2012, 09/09/2013, 09/11/2013, 10/01/2014, 10/10/2014, 09/30/2015, 10/11/2016, 09/27/2017, 09/26/2018, 09/03/2020, 09/10/2021, 09/23/2022, 09/29/2023    Influenza Split High Dose Preservative Free IM 10/03/2017, 09/18/2019    Influenza, high dose seasonal 0.7 mL 09/03/2020    Pneumococcal Conjugate 13-Valent 10/10/2011    Pneumococcal Conjugate Vaccine 20-valent (Pcv20), Polysace 05/04/2023    Pneumococcal Polysaccharide PPV23 10/10/2013, 04/18/2019      Health Maintenance:         Topic Date Due    Lung Cancer Screening  01/06/2024    Hepatitis C Screening  05/04/2024 (Originally 1950)    Colorectal Cancer Screening  05/04/2024 (Originally 11/25/1995)    Breast Cancer Screening: Mammogram  02/20/2024         Topic Date Due    Hepatitis B Vaccine (1 of 3 - Risk 3-dose series) Never done    COVID-19 Vaccine (5 - Moderna series) 01/30/2023      Medicare Screening Tests and Risk Assessments:     Last Medicare Wellness visit information reviewed, patient interviewed, no change since last AWV. Health Risk Assessment:   Patient rates overall health as good. Patient feels that their physical health rating is same. Patient is satisfied with their life. Eyesight was rated as same. Hearing was rated as same. Patient feels that their emotional and mental health rating is same. Patients states they are never, rarely angry.  Patient states they are sometimes unusually tired/fatigued. Pain experienced in the last 7 days has been none. Patient states that she has experienced no weight loss or gain in last 6 months. Depression Screening:   PHQ-2 Score: 0      Fall Risk Screening: In the past year, patient has experienced: no history of falling in past year      Home Safety:  Patient does not have trouble with stairs inside or outside of their home. Patient has working smoke alarms and has working carbon monoxide detector. Home safety hazards include: none. Medications:   Patient is not currently taking any over-the-counter supplements. Patient is able to manage medications. Activities of Daily Living (ADLs)/Instrumental Activities of Daily Living (IADLs):   Walk and transfer into and out of bed and chair?: Yes  Dress and groom yourself?: Yes    Bathe or shower yourself?: Yes    Feed yourself? Yes  Do your laundry/housekeeping?: Yes  Manage your money, pay your bills and track your expenses?: Yes  Make your own meals?: Yes    Do your own shopping?: Yes    Previous Hospitalizations:   Any hospitalizations or ED visits within the last 12 months?: No      Advance Care Planning:   Living will: Yes    Durable POA for healthcare:  Yes    Advanced directive: Yes      Cognitive Screening:   Provider or family/friend/caregiver concerned regarding cognition?: No    PREVENTIVE SCREENINGS      Cardiovascular Screening:    General: History Lipid Disorder    Due for: Lipid Panel      Diabetes Screening:     General: History Diabetes      Colorectal Cancer Screening:     General: Risks and Benefits Discussed and Patient Declines      Breast Cancer Screening:     General: Screening Current      Cervical Cancer Screening:    General: Screening Not Indicated      Osteoporosis Screening:    General: Risks and Benefits Discussed and Patient Declines      Abdominal Aortic Aneurysm (AAA) Screening:        General: Risks and Benefits Discussed and Screening Not Indicated      Lung Cancer Screening:     General: Screening Current      Hepatitis C Screening:    General: Screening Current    Other Counseling Topics:   Car/seat belt/driving safety, skin self-exam, sunscreen and calcium and vitamin D intake and regular weightbearing exercise. No results found. Physical Exam:     There were no vitals taken for this visit. Physical Exam  Constitutional:       Appearance: Normal appearance. HENT:      Right Ear: Tympanic membrane normal.      Left Ear: Tympanic membrane normal.      Mouth/Throat:      Pharynx: No posterior oropharyngeal erythema. Eyes:      Pupils: Pupils are equal, round, and reactive to light. Cardiovascular:      Heart sounds: Normal heart sounds. Pulmonary:      Breath sounds: Normal breath sounds. Abdominal:      Palpations: Abdomen is soft. Musculoskeletal:      Right lower leg: No edema. Left lower leg: No edema. Lymphadenopathy:      Cervical: No cervical adenopathy. Neurological:      Mental Status: She is oriented to person, place, and time.    Psychiatric:         Mood and Affect: Mood normal.          Saman Renner MD

## 2024-01-08 PROBLEM — Z00.00 MEDICARE ANNUAL WELLNESS VISIT, SUBSEQUENT: Status: RESOLVED | Noted: 2018-04-11 | Resolved: 2024-01-08

## 2024-01-12 DIAGNOSIS — J44.1 CHRONIC OBSTRUCTIVE PULMONARY DISEASE WITH ACUTE EXACERBATION (HCC): Chronic | ICD-10-CM

## 2024-01-12 DIAGNOSIS — J44.9 CHRONIC OBSTRUCTIVE PULMONARY DISEASE, UNSPECIFIED COPD TYPE (HCC): Chronic | ICD-10-CM

## 2024-01-12 RX ORDER — ALBUTEROL SULFATE 90 UG/1
2 AEROSOL, METERED RESPIRATORY (INHALATION) EVERY 4 HOURS PRN
Qty: 18 G | Refills: 1 | Status: SHIPPED | OUTPATIENT
Start: 2024-01-12

## 2024-01-12 NOTE — TELEPHONE ENCOUNTER
Reason for call:   [x] Refill   [] Prior Auth  [] Other:     Office:   [] PCP/Provider -   [x] Specialty/Provider -  pulmonary    Medication:   albuterol (PROVENTIL HFA,VENTOLIN HFA) 90 mcg/act inhaler   fluticasone-umeclidinium-vilanterol (Trelegy Ellipta) 100-62.5-25 mcg/actuation inhaler     Dose/Frequency:   2 puff, Inhalation, Every 4 hours PRN   1 puff, Inhalation, Daily     Pharmacy: [facility]/pharmacy #[id number] - [place] - [address] [place] [address]     Does the patient have enough for 3 days?   [x] Yes   [] No - Send as HP to POD

## 2024-01-16 RX ORDER — FLUTICASONE FUROATE, UMECLIDINIUM BROMIDE AND VILANTEROL TRIFENATATE 100; 62.5; 25 UG/1; UG/1; UG/1
1 POWDER RESPIRATORY (INHALATION) DAILY
Qty: 3 EACH | Refills: 0 | Status: SHIPPED | OUTPATIENT
Start: 2024-01-16

## 2024-01-31 ENCOUNTER — TELEPHONE (OUTPATIENT)
Age: 74
End: 2024-01-31

## 2024-03-07 DIAGNOSIS — E11.9 TYPE 2 DIABETES MELLITUS WITHOUT COMPLICATION, WITHOUT LONG-TERM CURRENT USE OF INSULIN (HCC): Chronic | ICD-10-CM

## 2024-03-21 DIAGNOSIS — J44.9 CHRONIC OBSTRUCTIVE PULMONARY DISEASE, UNSPECIFIED COPD TYPE (HCC): Chronic | ICD-10-CM

## 2024-03-22 RX ORDER — ALBUTEROL SULFATE 90 UG/1
AEROSOL, METERED RESPIRATORY (INHALATION)
Qty: 6.7 G | Refills: 5 | Status: SHIPPED | OUTPATIENT
Start: 2024-03-22

## 2024-05-01 ENCOUNTER — APPOINTMENT (OUTPATIENT)
Dept: LAB | Facility: CLINIC | Age: 74
End: 2024-05-01
Payer: MEDICARE

## 2024-05-01 DIAGNOSIS — E11.9 TYPE 2 DIABETES MELLITUS WITHOUT COMPLICATION, WITHOUT LONG-TERM CURRENT USE OF INSULIN (HCC): Chronic | ICD-10-CM

## 2024-05-01 DIAGNOSIS — E78.5 HYPERLIPIDEMIA, UNSPECIFIED HYPERLIPIDEMIA TYPE: Chronic | ICD-10-CM

## 2024-05-01 LAB
CHOLEST SERPL-MCNC: 173 MG/DL
EST. AVERAGE GLUCOSE BLD GHB EST-MCNC: 148 MG/DL
HBA1C MFR BLD: 6.8 %
HDLC SERPL-MCNC: 64 MG/DL
LDLC SERPL CALC-MCNC: 68 MG/DL (ref 0–100)
NONHDLC SERPL-MCNC: 109 MG/DL
TRIGL SERPL-MCNC: 204 MG/DL

## 2024-05-01 PROCEDURE — 83036 HEMOGLOBIN GLYCOSYLATED A1C: CPT

## 2024-05-01 PROCEDURE — 36415 COLL VENOUS BLD VENIPUNCTURE: CPT

## 2024-05-01 PROCEDURE — 80053 COMPREHEN METABOLIC PANEL: CPT

## 2024-05-01 PROCEDURE — 80061 LIPID PANEL: CPT

## 2024-05-08 LAB
ALBUMIN SERPL BCP-MCNC: 4.2 G/DL (ref 3.5–5)
ALP SERPL-CCNC: 71 U/L (ref 34–104)
ALT SERPL W P-5'-P-CCNC: 17 U/L (ref 7–52)
ANION GAP SERPL CALCULATED.3IONS-SCNC: 7 MMOL/L (ref 4–13)
AST SERPL W P-5'-P-CCNC: 18 U/L (ref 13–39)
BILIRUB SERPL-MCNC: 0.51 MG/DL (ref 0.2–1)
BUN SERPL-MCNC: 20 MG/DL (ref 5–25)
CALCIUM SERPL-MCNC: 9.4 MG/DL (ref 8.4–10.2)
CHLORIDE SERPL-SCNC: 103 MMOL/L (ref 96–108)
CO2 SERPL-SCNC: 29 MMOL/L (ref 21–32)
CREAT SERPL-MCNC: 0.78 MG/DL (ref 0.6–1.3)
GFR SERPL CREATININE-BSD FRML MDRD: 75 ML/MIN/1.73SQ M
GLUCOSE P FAST SERPL-MCNC: 137 MG/DL (ref 65–99)
POTASSIUM SERPL-SCNC: 4.1 MMOL/L (ref 3.5–5.3)
PROT SERPL-MCNC: 7.2 G/DL (ref 6.4–8.4)
SODIUM SERPL-SCNC: 139 MMOL/L (ref 135–147)

## 2024-05-09 ENCOUNTER — OFFICE VISIT (OUTPATIENT)
Dept: FAMILY MEDICINE CLINIC | Facility: CLINIC | Age: 74
End: 2024-05-09

## 2024-05-09 VITALS
BODY MASS INDEX: 32.79 KG/M2 | HEIGHT: 57 IN | OXYGEN SATURATION: 95 % | SYSTOLIC BLOOD PRESSURE: 125 MMHG | DIASTOLIC BLOOD PRESSURE: 78 MMHG | WEIGHT: 152 LBS | HEART RATE: 90 BPM

## 2024-05-09 DIAGNOSIS — J44.1 CHRONIC OBSTRUCTIVE PULMONARY DISEASE WITH ACUTE EXACERBATION (HCC): ICD-10-CM

## 2024-05-09 DIAGNOSIS — E78.5 HYPERLIPIDEMIA, UNSPECIFIED HYPERLIPIDEMIA TYPE: Chronic | ICD-10-CM

## 2024-05-09 DIAGNOSIS — E11.9 TYPE 2 DIABETES MELLITUS WITHOUT COMPLICATION, WITHOUT LONG-TERM CURRENT USE OF INSULIN (HCC): Chronic | ICD-10-CM

## 2024-05-09 RX ORDER — SIMVASTATIN 20 MG
20 TABLET ORAL
Qty: 90 TABLET | Refills: 1 | Status: SHIPPED | OUTPATIENT
Start: 2024-05-09

## 2024-05-09 NOTE — ASSESSMENT & PLAN NOTE
Oxygen saturation is stable, on continuous oxygen through nasal cannula.  Continue trelegy per Pulmonary.

## 2024-05-09 NOTE — ASSESSMENT & PLAN NOTE
Lab Results   Component Value Date    HGBA1C 6.8 (H) 05/01/2024   A1c stable.  Continue metformin 500 milligram daily.  Continue monitoring.  Renal function stable.

## 2024-05-09 NOTE — PROGRESS NOTES
Subjective:      Patient ID: Susanna Guevara is a 73 y.o. female.    HPI    Patient is here for routine 6-month follow-up.  Has history of type 2 diabetes, dyslipidemia.  A1c 6.9, LDL is at goal.  Patient is on low-dose metformin, simvastatin 20 mg.  Tolerating medication without any side effects.  Has history of dyspnea on exertion secondary to COPD, tolerating her inhalers well.  Follows up with pulmonary routinely.  Denies any symptoms of chest pain    Past Medical History:   Diagnosis Date    COPD (chronic obstructive pulmonary disease) (HCC)     Diabetes mellitus (HCC)     Hyperlipidemia     Pulmonary nodule 8/31/2017       Family History   Problem Relation Age of Onset    Liver disease Mother     Substance Abuse Father     COPD Sister     Diabetes Sister     No Known Problems Sister     No Known Problems Daughter     No Known Problems Daughter     No Known Problems Son        Past Surgical History:   Procedure Laterality Date    HAND SURGERY      TENDON TRANSFER      Left lower arm        reports that she quit smoking about 7 years ago. Her smoking use included cigarettes. She started smoking about 54 years ago. She has a 47 pack-year smoking history. She has never used smokeless tobacco. She reports that she does not drink alcohol and does not use drugs.      Current Outpatient Medications:     albuterol (PROVENTIL HFA,VENTOLIN HFA) 90 mcg/act inhaler, TAKE 2 PUFFS BY MOUTH EVERY 4 HOURS AS NEEDED FOR WHEEZE, Disp: 6.7 g, Rfl: 5    Calcium 500-100 MG-UNIT CHEW, Chew 1 tablet daily, Disp: , Rfl:     fluticasone-umeclidinium-vilanterol (Trelegy Ellipta) 100-62.5-25 mcg/actuation inhaler, Inhale 1 puff daily Rinse mouth after use., Disp: 3 each, Rfl: 0    levalbuterol (XOPENEX) 1.25 mg/3 mL nebulizer solution, TAKE 1 VIAL BY NEBULIZATION EVERY 8 (EIGHT) HOURS AS NEEDED FOR WHEEZING OR SHORTNESS OF BREATH, Disp: 810 mL, Rfl: 2    metFORMIN (GLUCOPHAGE) 500 mg tablet, Take 1 tablet (500 mg total) by mouth daily  "with breakfast, Disp: 90 tablet, Rfl: 1    simvastatin (ZOCOR) 20 mg tablet, Take 1 tablet (20 mg total) by mouth daily at bedtime, Disp: 90 tablet, Rfl: 1    ibuprofen (MOTRIN) 600 mg tablet, Take 600 mg by mouth 3 (three) times a day as needed (Patient not taking: Reported on 5/4/2023), Disp: , Rfl:     The following portions of the patient's history were reviewed and updated as appropriate: allergies, current medications, past family history, past medical history, past social history, past surgical history and problem list.    Review of Systems   Constitutional: Negative.    Respiratory: Negative.     Cardiovascular: Negative.            Objective:    /78   Pulse 90   Ht 4' 9\" (1.448 m)   Wt 68.9 kg (152 lb)   SpO2 95%   BMI 32.89 kg/m²      Physical Exam  Constitutional:       Appearance: Normal appearance.   Cardiovascular:      Pulses: no weak pulses.      Heart sounds: Normal heart sounds.   Pulmonary:      Breath sounds: Normal breath sounds.   Feet:      Right foot:      Skin integrity: No ulcer, skin breakdown, erythema, warmth, callus or dry skin.      Left foot:      Skin integrity: No ulcer, skin breakdown, erythema, warmth, callus or dry skin.         Patient's shoes and socks removed.    Right Foot/Ankle   Right Foot Inspection  Skin Exam: skin normal and skin intact. No dry skin, no warmth, no callus, no erythema, no maceration, no abnormal color, no pre-ulcer, no ulcer and no callus.     Toe Exam: ROM and strength within normal limits.     Sensory   Vibration: intact  Proprioception: intact  Monofilament testing: intact    Vascular  Capillary refills: < 3 seconds      Left Foot/Ankle  Left Foot Inspection  Skin Exam: skin normal and skin intact. No dry skin, no warmth, no erythema, no maceration, normal color, no pre-ulcer, no ulcer and no callus.     Toe Exam: ROM and strength within normal limits.     Sensory   Vibration: intact  Proprioception: intact  Monofilament testing: " intact    Vascular  Capillary refills: < 3 seconds      Assign Risk Category  No deformity present  No loss of protective sensation  No weak pulses  Risk: 0      Recent Results (from the past 1008 hour(s))   Comprehensive metabolic panel    Collection Time: 05/01/24 10:32 AM   Result Value Ref Range    Sodium 139 135 - 147 mmol/L    Potassium 4.1 3.5 - 5.3 mmol/L    Chloride 103 96 - 108 mmol/L    CO2 29 21 - 32 mmol/L    ANION GAP 7 4 - 13 mmol/L    BUN 20 5 - 25 mg/dL    Creatinine 0.78 0.60 - 1.30 mg/dL    Glucose, Fasting 137 (H) 65 - 99 mg/dL    Calcium 9.4 8.4 - 10.2 mg/dL    AST 18 13 - 39 U/L    ALT 17 7 - 52 U/L    Alkaline Phosphatase 71 34 - 104 U/L    Total Protein 7.2 6.4 - 8.4 g/dL    Albumin 4.2 3.5 - 5.0 g/dL    Total Bilirubin 0.51 0.20 - 1.00 mg/dL    eGFR 75 ml/min/1.73sq m   Hemoglobin A1C    Collection Time: 05/01/24 10:32 AM   Result Value Ref Range    Hemoglobin A1C 6.8 (H) Normal 4.0-5.6%; PreDiabetic 5.7-6.4%; Diabetic >=6.5%; Glycemic control for adults with diabetes <7.0% %     mg/dl   Lipid panel    Collection Time: 05/01/24 10:32 AM   Result Value Ref Range    Cholesterol 173 See Comment mg/dL    Triglycerides 204 (H) See Comment mg/dL    HDL, Direct 64 >=50 mg/dL    LDL Calculated 68 0 - 100 mg/dL    Non-HDL-Chol (CHOL-HDL) 109 mg/dl       Assessment/Plan:             Problem List Items Addressed This Visit          Respiratory    COPD (chronic obstructive pulmonary disease) (HCC) (Chronic)     Oxygen saturation is stable, on continuous oxygen through nasal cannula.  Continue trelegy per Pulmonary.            Endocrine    Type 2 diabetes mellitus without complication, without long-term current use of insulin (HCC) (Chronic)       Lab Results   Component Value Date    HGBA1C 6.8 (H) 05/01/2024   A1c stable.  Continue metformin 500 milligram daily.  Continue monitoring.  Renal function stable.          Relevant Medications    metFORMIN (GLUCOPHAGE) 500 mg tablet    Other Relevant  Orders    IRIS Diabetic eye exam    Comprehensive metabolic panel    Hemoglobin A1C    Albumin / creatinine urine ratio       Other    HLD (hyperlipidemia) (Chronic)     LDL is at goal.  Continue simvastatin 20 milligram daily.  If triglycerides are borderline high.  Patient advised to continue with low-fat diet.  Continue monitoring.         Relevant Medications    simvastatin (ZOCOR) 20 mg tablet    Other Relevant Orders    Lipid panel

## 2024-05-10 ENCOUNTER — TELEPHONE (OUTPATIENT)
Dept: FAMILY MEDICINE CLINIC | Facility: CLINIC | Age: 74
End: 2024-05-10

## 2024-05-10 NOTE — TELEPHONE ENCOUNTER
----- Message from Svetlana Klein MD sent at 5/9/2024  7:20 PM EDT -----  Please call patient with normal iris results.    Valtrex Pregnancy And Lactation Text: this medication is Pregnancy Category B and is considered safe during pregnancy. This medication is not directly found in breast milk but it's metabolite acyclovir is present.

## 2024-05-16 ENCOUNTER — HOSPITAL ENCOUNTER (OUTPATIENT)
Dept: MAMMOGRAPHY | Facility: HOSPITAL | Age: 74
Discharge: HOME | End: 2024-05-16
Payer: MEDICARE

## 2024-05-16 VITALS — BODY MASS INDEX: 32.77 KG/M2 | WEIGHT: 151.9 LBS | HEIGHT: 57 IN

## 2024-05-16 DIAGNOSIS — Z12.31 VISIT FOR SCREENING MAMMOGRAM: ICD-10-CM

## 2024-05-16 PROCEDURE — 77067 SCR MAMMO BI INCL CAD: CPT

## 2024-05-16 PROCEDURE — 77063 BREAST TOMOSYNTHESIS BI: CPT

## 2024-07-31 ENCOUNTER — OFFICE VISIT (OUTPATIENT)
Dept: PULMONOLOGY | Facility: CLINIC | Age: 74
End: 2024-07-31
Payer: MEDICARE

## 2024-07-31 VITALS
SYSTOLIC BLOOD PRESSURE: 140 MMHG | HEART RATE: 111 BPM | BODY MASS INDEX: 34.32 KG/M2 | WEIGHT: 158.6 LBS | DIASTOLIC BLOOD PRESSURE: 80 MMHG | OXYGEN SATURATION: 95 %

## 2024-07-31 DIAGNOSIS — F17.201 TOBACCO ABUSE, IN REMISSION: Primary | ICD-10-CM

## 2024-07-31 DIAGNOSIS — R00.0 CHRONIC TACHYCARDIA: ICD-10-CM

## 2024-07-31 DIAGNOSIS — R06.09 DYSPNEA ON EXERTION: ICD-10-CM

## 2024-07-31 DIAGNOSIS — J43.2 CENTRILOBULAR EMPHYSEMA (HCC): Chronic | ICD-10-CM

## 2024-07-31 DIAGNOSIS — F17.211 CIGARETTE NICOTINE DEPENDENCE IN REMISSION: ICD-10-CM

## 2024-07-31 PROCEDURE — 99214 OFFICE O/P EST MOD 30 MIN: CPT | Performed by: INTERNAL MEDICINE

## 2024-07-31 NOTE — PROGRESS NOTES
Progress Note - Pulmonary   Susanna Guevara 73 y.o. female MRN: [id number]   Encounter: [id number]      Assessment/Plan:  Patient is 73-year-old female with past medical history significant nicotine dependence in remission, severe COPD with chronic hypoxemic respiratory failure who presents for routine follow-up.  Overall, the patient is stable as compared to last visit.  She does continue the resting tachycardia which is at her baseline.  Previous evaluations been negative.  No acute indication for further evaluation or beta-blocker at the current time.  For COPD no recent exacerbations, may continue Trelegy.  For her chronic hypoxemic respiratory failure she is using 2 L nasal cannula with exertion.  The patient is due for an updated lung cancer screening CT scan and will have this in the next several weeks.    Tachycardia  -  still notes periods of tachycardia              -  Likely appropriate compensation but maybe pathologic  -  Monitor at home              -  With both rest and exertion  -  No significant findings on echo     Severe COPD  -  does not qualify for EBV at this time given low RV  -  continue [id number]; albuterol p.r.n.  -  No evidence of acute exacerbation     Chronic hypoxemic respiratory failure  -  Currently using 2L w/ exertion and occasionally to sleep  -  Encouraged to use supplemental oxygen more frequently with exertion     Lung nodules  -  Stable  -  Continue annual LDCT  -  Next due 7/2024     Dyspnea  -  Related to COPD vs arrhythmia  -  encouraged increased exercise  -  Will further evaluate tachycardia     Tobacco abuse  -  Quit 2017  -  Qualifies for lung cancer screening              -  Next 1/2024    1. Tobacco abuse, in remission  -     CT lung screening program; Future; Expected date: 07/31/2024  2. Cigarette nicotine dependence in remission  -     CT lung screening program; Future; Expected date: 07/31/2024  3. Chronic tachycardia  4. Dyspnea on exertion  5. Centrilobular  emphysema (HCC)    I discussed with her that she is a candidate for lung cancer CT screening.     The following Shared Decision-Making points were covered:  Benefits of screening were discussed, including the rates of reduction in death from lung cancer and other causes.  Harms of screening were reviewed, including false positive tests, radiation exposure levels, risks of invasive procedures, risks of complications of screening, and risk of overdiagnosis.  I counseled on the importance of adherence to annual lung cancer LDCT screening, impact of co-morbidities, and ability or willingness to undergo diagnosis and treatment.  I counseled on the importance of maintaining abstinence as a former smoker or was counseled on the importance of smoking cessation if a current smoker    Review of Eligibility Criteria: She meets all of the criteria for Lung Cancer Screening.   She is 73 y.o.   She has 20 pack year tobacco history and is a current smoker or has quit within the past 15 years  She presents no signs or symptoms of lung cancer    After discussion, the patient decided to elect lung cancer screening.      Patient may follow up in 6 months or sooner as necessary.     Orders:  Orders Placed This Encounter   Procedures    CT lung screening program     Standing Status:   Future     Standing Expiration Date:   7/31/2025     Scheduling Instructions:      If possible wear clothing without any metal in the chest and abdomen area.  Sweat suit, shorts, sports bra or bra without underwire may eliminate the need to change. Please arrive 15 minutes prior to your appointment time to register. On the day of the test, please bring any prior CT or MRI studies of this area with you that were not performed at a [facility] facility.            This is now being billed through your insurance and if you have a copay, it is required at time of service.  Otherwise, you can go through .              To schedule this appointment,  please contact Central Scheduling at [phone].           Order Specific Question:   What is the patient's sedation requirement?     Answer:   No Sedation     Order Specific Question:   Does patient have a 20 pack year smoking history? (Equivalent to 1 pack of cigarettes per day for a year) IF NO, PATIENT IS NOT ELIGIBLE FOR THIS PROGRAM.     Answer:   Yes     Order Specific Question:   Has the patient been a current smoker or one who has quit smoking within the last 15 years? IF NO, PATIENT IS NOT ELIGIBLE FOR THIS PROGRAM.     Answer:   Yes     Order Specific Question:   Does the patient show any signs or symptoms of lung cancer?     Answer:   No     Order Specific Question:   Is this the first (baseline) CT or an annual exam?     Answer:   Annual [2]     Order Specific Question:   Release to patient through [facility]     Answer:   Immediate     Order Specific Question:   Reason for Exam     Answer:   screening for lung cancer     Order Specific Question:   Is this a low dose CT or a routine CT?     Answer:   Low Dose CT [1]     Order Specific Question:   Is there documentation of shared decision making?     Answer:   Yes       Subjective:   The patient is overall doing well. She does have trouble with the heat and does stay in the house. She continues to use her inhaler and nebulizer.      She notes that her heart rate is elevated with exertion.      Inhaler Regimen:  Trelegy  Albuterol PRN    Remainder of review of systems negative except as described in HPI.      The following portions of the patient's history were reviewed and updated as appropriate: allergies, current medications, past family history, past medical history, past social history, past surgical history and problem list.     Objective:   Vitals: Blood pressure 140/80, pulse (!) 111, weight 71.9 kg (158 lb 9.6 oz), SpO2 95%., 2L NC, Body mass index is 34.32 kg/m².    Physical Exam  Gen: Pleasant, awake, alert, oriented x 3, no acute  distress  HEENT: Mucous membranes moist, no oral lesions, no thrush  NECK: No accessory muscle use, JVP not elevated  Cardiac: tachycardic regular, single S1, single S2, no murmurs, no rubs, no gallops  Lungs: decreased breath sounds  Abdomen: normoactive bowel sounds, soft nontender, nondistended, no rebound or rigidity, no guarding  Extremities: no cyanosis, no clubbing, no LE edema  MSK:  Strength equal in all extremities  Derm:  No rashes/lesions noted  Neuro:  Appropriate mood/affect    Labs: I have personally reviewed pertinent lab results.  Lab Results   Component Value Date    WBC 6.4 09/26/2018    WBC 9.57 09/11/2017    HGB 13.5 09/26/2018    HGB 10.8 (L) 09/11/2017     09/26/2018     09/11/2017     Lab Results   Component Value Date    CREATININE 0.78 05/01/2024        Imaging and other studies: I have personally reviewed pertinent reports.    CT lung screening 1/2023  Radiology findings:  LUNGS:  Mild emphysema.  -3 mm right upper lobe nodule (3/51), stable.  -2 mm right middle lobe nodule (3/59), stable.  -3 mm right middle lobe nodule (3/70), stable.  -4-5 mm right lower lobe nodule (3/84), stable.  -2 mm left upper lobe nodule (3/45), stable.  -2 mm left lower lobe nodule (3/88), stable.  A few additional 2-3 mm left upper and lower lobe nodules best depicted on series 603 are also unchanged.  No new nodules.  Tiny right upper lobe calcified granuloma.  No endobronchial lesions.  PLEURA:  Unremarkable  HEART/GREAT VESSELS: Heart is unremarkable for patient's age. Ascending aorta is top normal caliber at 4 cm. Atherosclerotic changes thoracic aorta and coronary arteries.  MEDIASTINUM AND TAYLER:  Unremarkable.  CHEST WALL AND LOWER NECK:  Unremarkable.  VISUALIZED STRUCTURES IN THE UPPER ABDOMEN:  Unremarkable.  OSSEOUS STRUCTURES:  No acute fracture or destructive osseous lesion. Degenerative changes of the spine and left glenohumeral joint.    Pulmonary Function Testing: I have personally  reviewed pertinent reports.   and I have personally reviewed pertinent films in PACS  7/7/2021:  Severe obstruction with severe diffusion defect  FEV1/FVC Ratio: 49 %  Forced Vital Capacity: 1.65 L    70 % predicted  FEV1: 0.80 L     40 % predicted  Lung volumes by body plethysmography:   Total Lung Capacity 83 % predicted   Residual volume 110 % predicted  DLCO corrected for patients hemoglobin level: 25 %    Stiven Thompson MD  [facility]

## 2024-08-16 DIAGNOSIS — J44.9 CHRONIC OBSTRUCTIVE PULMONARY DISEASE, UNSPECIFIED COPD TYPE (HCC): Chronic | ICD-10-CM

## 2024-08-16 RX ORDER — LEVALBUTEROL INHALATION SOLUTION 1.25 MG/3ML
SOLUTION RESPIRATORY (INHALATION)
Qty: 810 ML | Refills: 2 | Status: SHIPPED | OUTPATIENT
Start: 2024-08-16

## 2024-08-22 ENCOUNTER — TELEPHONE (OUTPATIENT)
Age: 74
End: 2024-08-22

## 2024-08-22 DIAGNOSIS — J96.11 CHRONIC HYPOXIC RESPIRATORY FAILURE (HCC): Primary | ICD-10-CM

## 2024-08-22 NOTE — TELEPHONE ENCOUNTER
Patient's  calling requesting an order be sent to DME for humidifier bottle for oxygen machine.  Bottle isn't working properly

## 2024-08-25 LAB
DME PARACHUTE DELIVERY DATE ACTUAL: NORMAL
DME PARACHUTE DELIVERY DATE REQUESTED: NORMAL
DME PARACHUTE ITEM DESCRIPTION: NORMAL
DME PARACHUTE ITEM DESCRIPTION: NORMAL
DME PARACHUTE ORDER STATUS: NORMAL
DME PARACHUTE SUPPLIER NAME: NORMAL
DME PARACHUTE SUPPLIER PHONE: NORMAL

## 2024-09-04 ENCOUNTER — TELEPHONE (OUTPATIENT)
Age: 74
End: 2024-09-04

## 2024-09-04 NOTE — TELEPHONE ENCOUNTER
Santana called in regarding his wife Tammi . Santana would like to know name of the Cardiologist  would like for Tammi to see .  Also Santana stated his wife is on oxygen 24 hours a day . Setting set at 4 . Santana checks tammi heart rate 15 minutes after she is awake - Low 100 high 130 . Oxygen level is love 79 high 93 - 5 days a week .     Please advise

## 2024-09-04 NOTE — TELEPHONE ENCOUNTER
Patient is calling and requesting Dr. Thompson please give them a call back and give them a name of the cardiologist please thank you.

## 2024-09-05 NOTE — PROGRESS NOTES
"Advanced Heart Failure/Pulmonary Hypertension Outpatient Note - Susanna Guevara 73 y.o. female MRN: [id number]    @ Encounter: [id number]      Assessment:  73 y.o. female PMH and acute problems listed later in this note (a partial list may also be included within 'assessment' section) presents for consultation.  I first met Susanna Guevara on 09/10/24.  Referred by No ref. provider found.     Normal 2023 echo  Patient Active Problem List   Diagnosis    Dyspnea    COPD (chronic obstructive pulmonary disease) (HCC)    Type 2 diabetes mellitus without complication, without long-term current use of insulin (HCC)    HLD (hyperlipidemia)    Lung nodule    Musculoskeletal neck pain    Need for pneumococcal vaccination    Tobacco abuse, in remission    Chronic tachycardia    BMI 28.0-28.9,adult    Chronic hypoxic respiratory failure (HCC)         Today I have reviewed all pertinent labs/imaging/data including but not limited to:        Lab Units 05/01/24  1032 10/31/23  1120 04/26/23  1116   CREATININE mg/dL 0.78 0.68 0.88         Lab Results   Component Value Date    K 4.1 05/01/2024     Lab Results   Component Value Date    HGBA1C 6.8 (H) 05/01/2024     No results found for: \"[id number]\", \"TSH\"  Lab Results   Component Value Date    LDLCALC 68 05/01/2024     No results found for: \"BNP\"   Lab Results   Component Value Date    NTBNP 57 08/28/2017          TODAY'S PLAN:     09/10/24  I am meeting patient for the first time today  Warm, euvolemic  Tachycardia documented in chart  At rest at home she says her HR is 85bpm. Higher with exertion.  No new cardiac complaints aside from progressive fatigue, sob  Quit smoking 8 years ago    Cardiac monitor ordered by another physician 2023, unclear why never done    Holter ordered today to start    Reviewed her 2023 echo at length, reassuring    No Rx changes today    Minimize albuterol as much as able    Follow up:  With the general cardiology team in 3-4 weeks after holter done. " This patient does not have advanced heart failure needs at present.  In addition to follow up with their other medical providers    Studies:  Today I have reviewed all pertinent patient data/labs/imaging where available, including but not limited to the below studies. This includes my independent interpretation. Selected results may be displayed here but comprehensive listing is omitted for note clarity and can be found in the epic chart.    ECG.    Echo.    Stress.    Cath.    HPI:   73 y.o. female PMH and acute problems listed later in this note (a partial list may also be included within 'assessment' section) presents for consultation.  No new CP/SOB/dizziness/palpitations/syncope.  No new fatigue.  No new unintentional weight changes.  No new leg swelling, PND, pillow orthopnea.  No new fevers, chills, cough, nausea, vomiting, diarrhea, dysuria.      ROS:  10 point ROS negative except as specified in HPI    Past Medical History:   Diagnosis Date    COPD (chronic obstructive pulmonary disease) (HCC)     Diabetes mellitus (HCC)     Hyperlipidemia     Pulmonary nodule 8/31/2017     Patient Active Problem List   Diagnosis    Dyspnea    COPD (chronic obstructive pulmonary disease) (HCC)    Type 2 diabetes mellitus without complication, without long-term current use of insulin (HCC)    HLD (hyperlipidemia)    Lung nodule    Musculoskeletal neck pain    Need for pneumococcal vaccination    Tobacco abuse, in remission    Chronic tachycardia    BMI 28.0-28.9,adult    Chronic hypoxic respiratory failure (HCC)     No current facility-administered medications for this visit.      Allergies   Allergen Reactions    Prednisone      Facial redness and sob    Medrol [Methylprednisolone] Angioedema     Social History     Socioeconomic History    Marital status: /Civil Union     Spouse name: Not on file    Number of children: Not on file    Years of education: Not on file    Highest education level: Not on file  "  Occupational History    Not on file   Tobacco Use    Smoking status: Former     Current packs/day: 0.00     Average packs/day: 1 pack/day for 47.0 years (47.0 ttl pk-yrs)     Types: Cigarettes     Start date:      Quit date: 2017     Years since quittin.6    Smokeless tobacco: Never   Vaping Use    Vaping status: Never Used   Substance and Sexual Activity    Alcohol use: No    Drug use: No    Sexual activity: Not on file   Other Topics Concern    Not on file   Social History Narrative    Most recent tobacco use screenin2017     Social Determinants of Health     Financial Resource Strain: Medium Risk (2023)    Overall Financial Resource Strain (CARDIA)     Difficulty of Paying Living Expenses: Somewhat hard   Food Insecurity: Not on file   Transportation Needs: No Transportation Needs (2023)    PRAPARE - Transportation     Lack of Transportation (Medical): No     Lack of Transportation (Non-Medical): No   Physical Activity: Not on file   Stress: Not on file   Social Connections: Not on file   Intimate Partner Violence: Not on file   Housing Stability: Not on file     Family History   Problem Relation Age of Onset    Liver disease Mother     Substance Abuse Father     COPD Sister     Diabetes Sister     No Known Problems Sister     No Known Problems Daughter     No Known Problems Daughter     No Known Problems Son        Physical Exam:  Vitals:    09/10/24 1357   BP: 122/70   BP Location: Left arm   Patient Position: Sitting   Cuff Size: Standard   Pulse: 105   SpO2: 92%   Weight: 67.4 kg (148 lb 11.2 oz)   Height: 4' 9\" (1.448 m)     Constitutional: NAD, non toxic, frail  Ears/nose/mouth/throat: atraumatic  CV: reg, low grade tachy, nl S1S2, no murmurs/rubs/gallups, no JVD, no HJR  Resp: decreased sounds BL  GI: Soft, NTND  MSK: no swollen joints in exposed areas  Extr: trace LE edema, warm LE  Pysche: Normal affect  Neuro: appropriate in conversation  Skin: dry and intact in exposed " areas    Labs & Results:  Lab Results   Component Value Date    WBC 6.4 09/26/2018    HGB 13.5 09/26/2018    HCT 40.4 09/26/2018    MCV 90.0 09/26/2018     09/26/2018     Lab Results   Component Value Date    SODIUM 139 05/01/2024    K 4.1 05/01/2024     05/01/2024    CO2 29 05/01/2024    BUN 20 05/01/2024    CREATININE 0.78 05/01/2024    GLUC 112 (H) 09/23/2019    CALCIUM 9.4 05/01/2024       Counseling / Coordination of Care  Greater than 50% of total time was spent with the patient and / or family counseling and / or coordination of care. Discussion included diagnoses, most recent studies and any changes in treatment.    Thank you for the opportunity to participate in the care of this patient.    Ramsey Turpin MD  Attending Physician  Advanced Heart Failure and Transplant Cardiology  [facility]

## 2024-09-10 ENCOUNTER — CONSULT (OUTPATIENT)
Dept: CARDIOLOGY CLINIC | Facility: CLINIC | Age: 74
End: 2024-09-10
Payer: MEDICARE

## 2024-09-10 VITALS
WEIGHT: 148.7 LBS | DIASTOLIC BLOOD PRESSURE: 70 MMHG | HEIGHT: 57 IN | BODY MASS INDEX: 32.08 KG/M2 | SYSTOLIC BLOOD PRESSURE: 122 MMHG | HEART RATE: 105 BPM | OXYGEN SATURATION: 92 %

## 2024-09-10 DIAGNOSIS — R00.0 CHRONIC TACHYCARDIA: Primary | ICD-10-CM

## 2024-09-10 DIAGNOSIS — J96.11 CHRONIC HYPOXIC RESPIRATORY FAILURE (HCC): ICD-10-CM

## 2024-09-10 DIAGNOSIS — J43.2 CENTRILOBULAR EMPHYSEMA (HCC): Chronic | ICD-10-CM

## 2024-09-10 PROCEDURE — 99204 OFFICE O/P NEW MOD 45 MIN: CPT | Performed by: STUDENT IN AN ORGANIZED HEALTH CARE EDUCATION/TRAINING PROGRAM

## 2024-09-19 ENCOUNTER — HOSPITAL ENCOUNTER (OUTPATIENT)
Dept: CT IMAGING | Facility: HOSPITAL | Age: 74
Discharge: HOME | End: 2024-09-19
Attending: INTERNAL MEDICINE
Payer: MEDICARE

## 2024-09-19 DIAGNOSIS — F17.211 CIGARETTE NICOTINE DEPENDENCE IN REMISSION: ICD-10-CM

## 2024-09-19 DIAGNOSIS — F17.201 TOBACCO ABUSE, IN REMISSION: ICD-10-CM

## 2024-09-19 PROCEDURE — 71271 CT THORAX LUNG CANCER SCR C-: CPT

## 2024-09-23 ENCOUNTER — HOSPITAL ENCOUNTER (OUTPATIENT)
Dept: NON INVASIVE DIAGNOSTICS | Facility: CLINIC | Age: 74
Discharge: HOME | End: 2024-09-23
Payer: MEDICARE

## 2024-09-23 DIAGNOSIS — R00.0 CHRONIC TACHYCARDIA: ICD-10-CM

## 2024-09-23 PROCEDURE — 93225 XTRNL ECG REC<48 HRS REC: CPT

## 2024-09-23 PROCEDURE — 93226 XTRNL ECG REC<48 HR SCAN A/R: CPT

## 2024-09-25 DIAGNOSIS — R91.1 LUNG NODULE: Primary | ICD-10-CM

## 2024-09-25 NOTE — PROGRESS NOTES
Called and reviewed results of CT scan.  Informed her of nodule.  Will repeat imaging in 3 months which is the middle of December.

## 2024-09-26 PROCEDURE — 93227 XTRNL ECG REC<48 HR R&I: CPT | Performed by: INTERNAL MEDICINE

## 2024-09-27 ENCOUNTER — TELEPHONE (OUTPATIENT)
Dept: CARDIOLOGY CLINIC | Facility: CLINIC | Age: 74
End: 2024-09-27

## 2024-09-27 NOTE — TELEPHONE ENCOUNTER
Called patient and gave results.       ----- Message from Ramsey Turpin MD sent at 9/27/2024  8:17 AM EDT -----  Please notify pt her holter was acceptable for now. No malignant arrhythmias noted.    Further discussion to be had at her upcoming cardiology appmnt.      Thanks    - Ramsey

## 2024-09-27 NOTE — RESULT ENCOUNTER NOTE
Please notify pt her holter was acceptable for now. No malignant arrhythmias noted.    Further discussion to be had at her upcoming cardiology appmnt.      Thanks    - Ramsey

## 2024-10-14 ENCOUNTER — OFFICE VISIT (OUTPATIENT)
Dept: PULMONOLOGY | Facility: CLINIC | Age: 74
End: 2024-10-14
Payer: MEDICARE

## 2024-10-14 VITALS
HEIGHT: 57 IN | WEIGHT: 146 LBS | TEMPERATURE: 98.1 F | HEART RATE: 132 BPM | SYSTOLIC BLOOD PRESSURE: 128 MMHG | BODY MASS INDEX: 31.5 KG/M2 | DIASTOLIC BLOOD PRESSURE: 74 MMHG | OXYGEN SATURATION: 92 %

## 2024-10-14 DIAGNOSIS — R91.1 LUNG NODULE: Primary | ICD-10-CM

## 2024-10-14 DIAGNOSIS — F17.201 TOBACCO ABUSE, IN REMISSION: ICD-10-CM

## 2024-10-14 DIAGNOSIS — R00.0 CHRONIC TACHYCARDIA: ICD-10-CM

## 2024-10-14 DIAGNOSIS — J43.2 CENTRILOBULAR EMPHYSEMA (HCC): Chronic | ICD-10-CM

## 2024-10-14 DIAGNOSIS — J96.11 CHRONIC HYPOXIC RESPIRATORY FAILURE (HCC): ICD-10-CM

## 2024-10-14 PROCEDURE — 99214 OFFICE O/P EST MOD 30 MIN: CPT | Performed by: INTERNAL MEDICINE

## 2024-10-14 PROCEDURE — G2211 COMPLEX E/M VISIT ADD ON: HCPCS | Performed by: INTERNAL MEDICINE

## 2024-10-14 NOTE — PROGRESS NOTES
Progress Note - Pulmonary   Susanna Guevara 73 y.o. female MRN: [id number]   Encounter: [id number]      Assessment/Plan:  Patient is 73-year-old female with past medical history significant nicotine dependence in remission, severe COPD, chronic hypoxemic respiratory failure who presents for routine follow-up.  The patient's tachycardia is been thoroughly evaluated including Holter monitor which did not show any significant findings with resting heart rate approximately 88 bpm.  She does have follow-up with cardiology for further evaluation of this.  For her COPD and breathing, she is doing better.  Samples of pressure provided.  Reviewed the patient's 7 mm nodule extensively.  We discussed further management occluding follow-up CT scan in December.  If this were to be positive, would likely need CT-guided biopsy versus PET CT scan.    Tachycardia  -  tachy upon arrival              -  Likely appropriate compensation based on review of holter monitor  -  has upcoming follow-up with cardiology  -  No significant findings on echo     Severe COPD  -  does not qualify for EBV at this time given low RV  -  continue Trelegy 100; albuterol p.r.n.   -  samples of Breztri provided  -  No evidence of acute exacerbation     Chronic hypoxemic respiratory failure  -  Currently using 2L w/ exertion and occasionally to sleep     Lung nodules  -  new 7mm nodule noted   -  repeat imaging 12/2024   -  [place] risk 9.5%     Dyspnea  -  Related to COPD vs arrhythmia  -  returned to baseline     Tobacco abuse  -  Quit 2017  -  continue lung cancer screening pending evaluation of nodule    1. Lung nodule  2. Centrilobular emphysema (HCC)  3. Chronic hypoxic respiratory failure (HCC)  4. Tobacco abuse, in remission  5. Chronic tachycardia      Patient may follow up in 6 months or sooner as necessary.     Orders:  No orders of the defined types were placed in this encounter.      Subjective:   The patient notes overall she is back to her  "baseline.  She has improved after a rough month.      She feels her breathing has improved.  She denies fevers, chills, nausea or vomiting.  She continues to use her supplemental oxygen as directed.    Inhaler Regimen:  Trelegy  Albuterol PRN    Remainder of review of systems negative except as described in HPI.      The following portions of the patient's history were reviewed and updated as appropriate: allergies, current medications, past family history, past medical history, past social history, past surgical history and problem list.     Objective:   Vitals: Blood pressure 128/74, pulse (!) 132, temperature 98.1 °F (36.7 °C), height 4' 9\" (1.448 m), weight 66.2 kg (146 lb), SpO2 92%.,  2 L, Body mass index is 31.59 kg/m².    Physical Exam  Gen: pleasant, awake, alert, oriented x 3, no acute distress  HEENT: Mucous membranes moist, no oral lesions, no thrush  NECK: No accessory muscle use, JVP not elevated  Cardiac: Tachy, single S1, single S2, no murmurs, no rubs, no gallops  Lungs: slightly decreased breath sounds  Abdomen: normoactive bowel sounds, soft nontender, nondistended, no rebound or rigidity, no guarding  Extremities: no cyanosis, no clubbing, no LE edema  MSK:  Strength equal in all extremities  Derm:  No rashes/lesions noted  Neuro:  Appropriate mood/affect    Labs: I have personally reviewed pertinent lab results.  Lab Results   Component Value Date    WBC 6.4 09/26/2018    WBC 9.57 09/11/2017    HGB 13.5 09/26/2018    HGB 10.8 (L) 09/11/2017     09/26/2018     09/11/2017     Lab Results   Component Value Date    CREATININE 0.78 05/01/2024        Imaging and other studies: Results Review Statement: I reviewed radiology reports from this admission including: CT chest.  9/19/2024  Radiology findings:  LUNGS: 7 mm juxtapleural lateral basal right lower lobe nodule (3/159) and 4 mm juxtapleural posterior left lower lobe nodule, new since January 2023 (3/138).  5 mm solid smoothly " marginated lateral basal right lower lobe nodule, stable since 2017 and benign (3/159). Scattered 3 mm and smaller nodules. Moderate emphysema. Benign linear scar.  AIRWAYS: No significant filling defects.  PLEURA:  Unremarkable.  HEART/GREAT VESSELS: Normal heart size. Moderate coronary artery calcification indicating atherosclerotic heart disease. Pulmonary artery enlargement.  MEDIASTINUM AND TAYLER: Small hiatal hernia.  CHEST WALL AND LOWER NECK: Unremarkable.  UPPER ABDOMEN:  Unremarkable.  OSSEOUS STRUCTURES: Mild degenerative disease in the spine.    Pulmonary Function Testing: Results Review Statement: I reviewed radiology reports from this admission including: PFT.  7/7/2021:  Severe obstruction with severe diffusion defect  FEV1/FVC Ratio: 49 %  Forced Vital Capacity: 1.65 L    70 % predicted  FEV1: 0.80 L     40 % predicted  Lung volumes by body plethysmography:   Total Lung Capacity 83 % predicted   Residual volume 110 % predicted  DLCO corrected for patients hemoglobin level: 25 %    Stiven Thompson MD  [facility]

## 2024-10-16 ENCOUNTER — OFFICE VISIT (OUTPATIENT)
Dept: CARDIOLOGY CLINIC | Facility: CLINIC | Age: 74
End: 2024-10-16
Payer: MEDICARE

## 2024-10-16 VITALS
DIASTOLIC BLOOD PRESSURE: 82 MMHG | BODY MASS INDEX: 31.94 KG/M2 | OXYGEN SATURATION: 91 % | SYSTOLIC BLOOD PRESSURE: 138 MMHG | WEIGHT: 147.6 LBS

## 2024-10-16 DIAGNOSIS — I25.10 CORONARY ARTERY DISEASE DUE TO LIPID RICH PLAQUE: ICD-10-CM

## 2024-10-16 DIAGNOSIS — E78.5 DYSLIPIDEMIA: ICD-10-CM

## 2024-10-16 DIAGNOSIS — R00.0 CHRONIC TACHYCARDIA: Primary | ICD-10-CM

## 2024-10-16 DIAGNOSIS — I25.83 CORONARY ARTERY DISEASE DUE TO LIPID RICH PLAQUE: ICD-10-CM

## 2024-10-16 DIAGNOSIS — E11.9 TYPE 2 DIABETES MELLITUS WITHOUT COMPLICATION, WITHOUT LONG-TERM CURRENT USE OF INSULIN (HCC): ICD-10-CM

## 2024-10-16 PROCEDURE — 93000 ELECTROCARDIOGRAM COMPLETE: CPT | Performed by: INTERNAL MEDICINE

## 2024-10-16 PROCEDURE — 99204 OFFICE O/P NEW MOD 45 MIN: CPT | Performed by: INTERNAL MEDICINE

## 2024-10-16 RX ORDER — ROSUVASTATIN CALCIUM 20 MG/1
20 TABLET, COATED ORAL DAILY
Qty: 30 TABLET | Refills: 3 | Status: SHIPPED | OUTPATIENT
Start: 2024-10-16

## 2024-10-16 NOTE — PROGRESS NOTES
Cardiology   Susanna Guevara 73 y.o. female MRN: [id number]   Encounter: [id number]        Reason for Consult / Principal Problem: Palpitations    Physician Requesting Consult: Svetlana Klein MD    PCP: Svetlana Klein MD            Assessment & Plan  Chronic tachycardia  Likely due to chronic hypoxemia, Holter monitor was unremarkable  Coronary artery disease due to lipid rich plaque    Type 2 diabetes mellitus without complication, without long-term current use of insulin (HCC)    Lab Results   Component Value Date    HGBA1C 6.8 (H) 05/01/2024     Dyslipidemia           Plan:    -Recommend regular follow-up with pulmonary  -Switch simvastatin to rosuvastatin 20 mg  -Return to office in 6 months for follow-up      CC: Palpitations with exertion    HPI  73 y.o. female with a history of severe COPD, on 3 L of oxygen at baseline, history of many decades of tobacco use.  Quit smoking 8 years ago.  Presented with episodes of tachycardia and palpitations while she was exerting herself.  She had a Holter monitor outpatient that demonstrated tachycardia with exertion but no supraventricular or ventricular arrhythmia.  She did have a CT scan as part of her lung cancer screening program that showed pulmonary nodules but also demonstrated coronary artery calcifications that appear to be moderate at least.      The patient denies chest pain,palpitations, orthopnea, PND, pedal edema, syncope, presyncope, diaphoresis, nausea/vomiting       The 10-year ASCVD risk score (Rukhsana DK, et al., 2019) is: 29%    Values used to calculate the score:      Age: 73 years      Sex: Female      Is Non- : No      Diabetic: Yes      Tobacco smoker: No      Systolic Blood Pressure: 138 mmHg      Is BP treated: No      HDL Cholesterol: 64 mg/dL      Total Cholesterol: 173 mg/dL            Physical exam  Objective   Vitals: Blood pressure 138/82, weight 67 kg (147 lb 9.6 oz), SpO2 91%.    General:  AO x3, no acute  distress, on 3 L nasal cannula oxygen   Cardiac:  S1-S2 normal,  No murmurs. JVP: normal  Lungs: Reduced air entry bilaterally  Abdomen:  Soft, nontender, nondistended.  Extremities:  Warm, well perfused, pulses palpable, no ulcers or rashes. Edema:absent  Neuro: Grossly nonfocal        ======================================================  TREADMILL STRESS  No results found for this or any previous visit.     ----------------------------------------------------------------------------------------------  NUCLEAR STRESS TEST: No results found for this or any previous visit.    No results found for this or any previous visit.      --------------------------------------------------------------------------------  CATH:  No results found for this or any previous visit.    --------------------------------------------------------------------------------  ECHO:   No results found for this or any previous visit.    No results found for this or any previous visit.    --------------------------------------------------------------------------------  HOLTER  No results found for this or any previous visit.    --------------------------------------------------------------------------------  CAROTIDS  No results found for this or any previous visit.       Diagnoses and all orders for this visit:    Chronic tachycardia  -     POCT ECG    Coronary artery disease due to lipid rich plaque  -     rosuvastatin (CRESTOR) 20 MG tablet; Take 1 tablet (20 mg total) by mouth daily  -     Lipid Panel With Direct LDL; Future       ======================================================          Review of Systems  ROS as noted above, otherwise 12 point review of systems was performed and is negative.     Historical Information   Past Medical History:   Diagnosis Date    COPD (chronic obstructive pulmonary disease) (HCC)     Diabetes mellitus (HCC)     Hyperlipidemia     Pulmonary nodule 8/31/2017     Past Surgical History:   Procedure  "Laterality Date    HAND SURGERY      TENDON TRANSFER      Left lower arm     Social History     Substance and Sexual Activity   Alcohol Use No     Social History     Substance and Sexual Activity   Drug Use No     Social History     Tobacco Use   Smoking Status Former    Current packs/day: 0.00    Average packs/day: 1 pack/day for 47.0 years (47.0 ttl pk-yrs)    Types: Cigarettes    Start date:     Quit date:     Years since quittin.7   Smokeless Tobacco Never     Family History   Problem Relation Age of Onset    Liver disease Mother     Substance Abuse Father     COPD Sister     Diabetes Sister     No Known Problems Sister     No Known Problems Daughter     No Known Problems Daughter     No Known Problems Son        Meds/Allergies   Hospital Medications: No current facility-administered medications for this visit.  Home Medications: Not in a hospital admission.    Allergies   Allergen Reactions    Prednisone      Facial redness and sob    Medrol [Methylprednisolone] Angioedema         Portions of the record may have been created with voice recognition software.  Occasional wrong words or \"sound a like\" substitutions may have occurred due to the inherent limitations of voice recognition software.  Read the chart carefully and recognize, using context, where substitutions have occurred.        I have spent a total of 46 min in reviewing and/or ordering tests, medications, or procedures, performing an examination or evaluation, reviewing pertinent history, counseling and educating the patient, referring and/or communicating with other health care professionals, documenting in the EMR and general coordination of care of the patient today.              "

## 2024-11-06 ENCOUNTER — RA CDI HCC (OUTPATIENT)
Dept: OTHER | Facility: HOSPITAL | Age: 74
End: 2024-11-06

## 2024-11-07 ENCOUNTER — APPOINTMENT (OUTPATIENT)
Dept: LAB | Facility: CLINIC | Age: 74
End: 2024-11-07
Payer: MEDICARE

## 2024-11-07 DIAGNOSIS — E78.5 HYPERLIPIDEMIA, UNSPECIFIED HYPERLIPIDEMIA TYPE: Chronic | ICD-10-CM

## 2024-11-07 DIAGNOSIS — E11.9 TYPE 2 DIABETES MELLITUS WITHOUT COMPLICATION, WITHOUT LONG-TERM CURRENT USE OF INSULIN (HCC): Chronic | ICD-10-CM

## 2024-11-07 LAB
ALBUMIN SERPL BCG-MCNC: 4.4 G/DL (ref 3.5–5)
ALP SERPL-CCNC: 76 U/L (ref 34–104)
ALT SERPL W P-5'-P-CCNC: 14 U/L (ref 7–52)
ANION GAP SERPL CALCULATED.3IONS-SCNC: 8 MMOL/L (ref 4–13)
AST SERPL W P-5'-P-CCNC: 17 U/L (ref 13–39)
BILIRUB SERPL-MCNC: 0.51 MG/DL (ref 0.2–1)
BUN SERPL-MCNC: 15 MG/DL (ref 5–25)
CALCIUM SERPL-MCNC: 9.5 MG/DL (ref 8.4–10.2)
CHLORIDE SERPL-SCNC: 101 MMOL/L (ref 96–108)
CHOLEST SERPL-MCNC: 151 MG/DL
CO2 SERPL-SCNC: 30 MMOL/L (ref 21–32)
CREAT SERPL-MCNC: 0.76 MG/DL (ref 0.6–1.3)
CREAT UR-MCNC: 114.1 MG/DL
EST. AVERAGE GLUCOSE BLD GHB EST-MCNC: 157 MG/DL
GFR SERPL CREATININE-BSD FRML MDRD: 77 ML/MIN/1.73SQ M
GLUCOSE P FAST SERPL-MCNC: 130 MG/DL (ref 65–99)
HBA1C MFR BLD: 7.1 %
HDLC SERPL-MCNC: 66 MG/DL
LDLC SERPL CALC-MCNC: 54 MG/DL (ref 0–100)
MICROALBUMIN UR-MCNC: 19.4 MG/L
MICROALBUMIN/CREAT 24H UR: 17 MG/G CREATININE (ref 0–30)
NONHDLC SERPL-MCNC: 85 MG/DL
POTASSIUM SERPL-SCNC: 3.9 MMOL/L (ref 3.5–5.3)
PROT SERPL-MCNC: 7.3 G/DL (ref 6.4–8.4)
SODIUM SERPL-SCNC: 139 MMOL/L (ref 135–147)
TRIGL SERPL-MCNC: 153 MG/DL

## 2024-11-07 PROCEDURE — 83036 HEMOGLOBIN GLYCOSYLATED A1C: CPT

## 2024-11-07 PROCEDURE — 82570 ASSAY OF URINE CREATININE: CPT

## 2024-11-07 PROCEDURE — 80053 COMPREHEN METABOLIC PANEL: CPT

## 2024-11-07 PROCEDURE — 36415 COLL VENOUS BLD VENIPUNCTURE: CPT

## 2024-11-07 PROCEDURE — 82043 UR ALBUMIN QUANTITATIVE: CPT

## 2024-11-07 PROCEDURE — 80061 LIPID PANEL: CPT

## 2024-11-09 DIAGNOSIS — I25.10 CORONARY ARTERY DISEASE DUE TO LIPID RICH PLAQUE: ICD-10-CM

## 2024-11-09 DIAGNOSIS — I25.83 CORONARY ARTERY DISEASE DUE TO LIPID RICH PLAQUE: ICD-10-CM

## 2024-11-10 DIAGNOSIS — E11.9 TYPE 2 DIABETES MELLITUS WITHOUT COMPLICATION, WITHOUT LONG-TERM CURRENT USE OF INSULIN (HCC): Chronic | ICD-10-CM

## 2024-11-10 DIAGNOSIS — J44.9 CHRONIC OBSTRUCTIVE PULMONARY DISEASE, UNSPECIFIED COPD TYPE (HCC): Chronic | ICD-10-CM

## 2024-11-11 RX ORDER — ROSUVASTATIN CALCIUM 20 MG/1
20 TABLET, COATED ORAL DAILY
Qty: 90 TABLET | Refills: 1 | Status: SHIPPED | OUTPATIENT
Start: 2024-11-11

## 2024-11-11 RX ORDER — ALBUTEROL SULFATE 90 UG/1
INHALANT RESPIRATORY (INHALATION)
Qty: 6.7 G | Refills: 5 | Status: SHIPPED | OUTPATIENT
Start: 2024-11-11

## 2024-11-13 ENCOUNTER — OFFICE VISIT (OUTPATIENT)
Dept: FAMILY MEDICINE CLINIC | Facility: CLINIC | Age: 74
End: 2024-11-13

## 2024-11-13 VITALS
BODY MASS INDEX: 31.07 KG/M2 | WEIGHT: 144 LBS | HEART RATE: 134 BPM | RESPIRATION RATE: 16 BRPM | HEIGHT: 57 IN | OXYGEN SATURATION: 90 %

## 2024-11-13 DIAGNOSIS — E11.9 TYPE 2 DIABETES MELLITUS WITHOUT COMPLICATION, WITHOUT LONG-TERM CURRENT USE OF INSULIN (HCC): Primary | Chronic | ICD-10-CM

## 2024-11-13 DIAGNOSIS — Z00.00 MEDICARE ANNUAL WELLNESS VISIT, SUBSEQUENT: ICD-10-CM

## 2024-11-13 DIAGNOSIS — E78.5 HYPERLIPIDEMIA, UNSPECIFIED HYPERLIPIDEMIA TYPE: Chronic | ICD-10-CM

## 2024-11-13 NOTE — PROGRESS NOTES
Name: Susanna Guevara      : 1950      MRN: [id number]  Encounter Provider: Svetlana Klein MD  Encounter Date: 2024   Encounter department: [facility]    Assessment & Plan  Type 2 diabetes mellitus without complication, without long-term current use of insulin ([facility])    Lab Results   Component Value Date    HGBA1C 7.1 (H) 2024   A1c stable.  Continue metformin 500 mg daily.  Continue with low-carb diet/monitoring at 6-month interval.    Orders:    Comprehensive metabolic panel; Future    Hemoglobin A1C; Future    Hyperlipidemia, unspecified hyperlipidemia type  LDL improved significantly since Crestor was increased to 20.  Continue Crestor 20 mg       Medicare annual wellness visit, subsequent  [facility]           Depression Screening and Follow-up Plan: Patient was screened for depression during today's encounter. They screened negative with a PHQ-2 score of 0.      Preventive health issues were discussed with patient, and age appropriate screening tests were ordered as noted in patient's After Visit Summary. Personalized health advice and appropriate referrals for health education or preventive services given if needed, as noted in patient's After Visit Summary.    History of Present Illness     HPI   Patient is here for routine 6-month follow-up.  Has history of type 2 diabetes, dyslipidemia.  A1c 6.9, LDL is at goal.  Patient is on low-dose metformin, simvastatin 20 mg.  Tolerating medication without any side effects.  Has history of dyspnea on exertion secondary to COPD, tolerating her inhalers well.  Follows up with pulmonary routinely.  Denies any symptoms of chest pain  Patient is dependent on oxygen through nasal cannula.  Patient Care Team:  Svetlana Klein MD as PCP - General (Family Medicine)  Stiven Thompson MD    Review of Systems   Respiratory:  Positive for shortness of breath.         At baseline   Cardiovascular: Negative.      Medical History Reviewed  by provider this encounter:       Annual Wellness Visit Questionnaire   Last Medicare Wellness visit information reviewed, patient interviewed, no change since last AWV.     Health Risk Assessment:   Patient rates overall health as good. Patient feels that their physical health rating is same. Patient is satisfied with their life. Eyesight was rated as same. Hearing was rated as same. Patient feels that their emotional and mental health rating is same. Patients states they are never, rarely angry. Patient states they are sometimes unusually tired/fatigued. Pain experienced in the last 7 days has been none. Patient states that she has experienced no weight loss or gain in last 6 months.     Depression Screening:   PHQ-2 Score: 0      Fall Risk Screening:   In the past year, patient has experienced: no history of falling in past year      Home Safety:  Patient does not have trouble with stairs inside or outside of their home. Patient has working smoke alarms and has working carbon monoxide detector. Home safety hazards include: none.     Medications:   Patient is not currently taking any over-the-counter supplements. Patient is able to manage medications.     Activities of Daily Living (ADLs)/Instrumental Activities of Daily Living (IADLs):   Walk and transfer into and out of bed and chair?: Yes  Dress and groom yourself?: Yes    Bathe or shower yourself?: Yes    Feed yourself? Yes  Do your laundry/housekeeping?: Yes  Manage your money, pay your bills and track your expenses?: Yes  Make your own meals?: Yes    Do your own shopping?: Yes    Previous Hospitalizations:   Any hospitalizations or ED visits within the last 12 months?: No      Advance Care Planning:   Living will: Yes    Durable POA for healthcare: Yes    Advanced directive: Yes      Cognitive Screening:   Provider or family/friend/caregiver concerned regarding cognition?: No    PREVENTIVE SCREENINGS      Cardiovascular Screening:    General: Screening Not  Indicated and History Lipid Disorder    Due for: Lipid Panel      Diabetes Screening:     General: Screening Not Indicated and History Diabetes      Colorectal Cancer Screening:     General: Risks and Benefits Discussed and Patient Declines      Breast Cancer Screening:     General: Screening Current      Cervical Cancer Screening:    General: Screening Not Indicated      Osteoporosis Screening:    General: Risks and Benefits Discussed and Patient Declines      Abdominal Aortic Aneurysm (AAA) Screening:        General: Risks and Benefits Discussed and Screening Not Indicated      Lung Cancer Screening:     General: Screening Current      Hepatitis C Screening:    General: Screening Current    Other Counseling Topics:   Car/seat belt/driving safety, skin self-exam, sunscreen and calcium and vitamin D intake and regular weightbearing exercise.     Social Drivers of Health     Financial Resource Strain: Medium Risk (11/8/2023)    Overall Financial Resource Strain (CARDIA)     Difficulty of Paying Living Expenses: Somewhat hard   Transportation Needs: No Transportation Needs (11/8/2023)    PRAPARE - Transportation     Lack of Transportation (Medical): No     Lack of Transportation (Non-Medical): No     No results found.    Objective   There were no vitals taken for this visit.    Physical Exam  Constitutional:       Appearance: Normal appearance.   Cardiovascular:      Heart sounds: Normal heart sounds.   Pulmonary:      Breath sounds: Normal breath sounds.      Comments: Oxygen through nasal cannula

## 2024-11-13 NOTE — ASSESSMENT & PLAN NOTE
Lab Results   Component Value Date    HGBA1C 7.1 (H) 11/07/2024   A1c stable.  Continue metformin 500 mg daily.  Continue with low-carb diet/monitoring at 6-month interval.    Orders:    Comprehensive metabolic panel; Future    Hemoglobin A1C; Future

## 2024-12-13 PROBLEM — Z00.00 MEDICARE ANNUAL WELLNESS VISIT, SUBSEQUENT: Status: RESOLVED | Noted: 2024-11-13 | Resolved: 2024-12-13

## 2024-12-17 ENCOUNTER — HOSPITAL ENCOUNTER (OUTPATIENT)
Dept: CT IMAGING | Facility: HOSPITAL | Age: 74
Discharge: HOME | End: 2024-12-17
Attending: INTERNAL MEDICINE
Payer: MEDICARE

## 2024-12-17 DIAGNOSIS — R91.1 LUNG NODULE: ICD-10-CM

## 2024-12-17 PROCEDURE — 71250 CT THORAX DX C-: CPT

## 2024-12-20 ENCOUNTER — TELEPHONE (OUTPATIENT)
Age: 74
End: 2024-12-20

## 2024-12-20 NOTE — TELEPHONE ENCOUNTER
Patient called in regards to her CT chest wo contrast results she completed on 12/17/24. I advised her the results are not yet finalized but I would send a message to the Doctor so he is aware. She hopes to hear back soon as she is worried of what the results may be. Please advise once able.    Call Back #[phone]

## 2024-12-23 ENCOUNTER — PATIENT MESSAGE (OUTPATIENT)
Dept: PULMONOLOGY | Facility: CLINIC | Age: 74
End: 2024-12-23

## 2024-12-23 NOTE — PATIENT COMMUNICATION
Pt called back regarding call from Dr. Thompson. Advised it was regarding results in [employer] she reviewed.    Pt concerned why she can't see results in EMR. Advised Dr. Thompson did review CT and report awaiting radiology to release. Once radiology releases she will see in EMR. Patient had no further questions and/or concerns at this time.

## 2024-12-30 ENCOUNTER — DOCUMENTATION (OUTPATIENT)
Dept: PULMONOLOGY | Facility: CLINIC | Age: 74
End: 2024-12-30

## 2025-01-02 NOTE — PROGRESS NOTES
Perez - may be candidate for BLVR but would need remainder of work up (PFT/6MWT/ABG/echo). Let us know how you/patient would want to proceed

## 2025-01-10 DIAGNOSIS — J43.2 CENTRILOBULAR EMPHYSEMA (HCC): Primary | Chronic | ICD-10-CM

## 2025-01-13 ENCOUNTER — TELEPHONE (OUTPATIENT)
Age: 75
End: 2025-01-13

## 2025-01-13 NOTE — TELEPHONE ENCOUNTER
Pt called because she wants to speak to Dr. Klein privately. She did not want to give me further info. Pt stated she is so upset about her leaving. Please, advise.

## 2025-01-30 ENCOUNTER — HOSPITAL ENCOUNTER (OUTPATIENT)
Dept: PULMONOLOGY | Facility: HOSPITAL | Age: 75
End: 2025-01-30
Attending: INTERNAL MEDICINE
Payer: MEDICARE

## 2025-01-30 DIAGNOSIS — J43.2 CENTRILOBULAR EMPHYSEMA (HCC): Chronic | ICD-10-CM

## 2025-01-30 LAB
ARTERIAL PATENCY WRIST A: ABNORMAL
BASE EXCESS BLDA CALC-SCNC: -1 MMOL/L (ref -2–3)
CA-I BLD-SCNC: 1.24 MMOL/L (ref 1.12–1.32)
FIO2 GAS DIL.REBREATH: 21 L
GLUCOSE SERPL-MCNC: 146 MG/DL (ref 65–140)
HCO3 BLDA-SCNC: 23.3 MMOL/L (ref 22–28)
HCT VFR BLD CALC: 39 % (ref 34.8–46.1)
HGB BLDA-MCNC: 13.3 G/DL (ref 11.5–15.4)
PCO2 BLD: 24 MMOL/L (ref 21–32)
PCO2 BLD: 36.3 MM HG (ref 36–44)
PH BLD: 7.42 [PH] (ref 7.35–7.45)
PO2 BLD: 66 MM HG (ref 75–129)
POTASSIUM BLD-SCNC: 4.1 MMOL/L (ref 3.5–5.3)
SAMPLE SITE: 0
SAO2 % BLD FROM PO2: 93 % (ref 60–85)
SODIUM BLD-SCNC: 136 MMOL/L (ref 136–145)
SPECIMEN SOURCE: ABNORMAL

## 2025-01-30 PROCEDURE — 94618 PULMONARY STRESS TESTING: CPT | Performed by: INTERNAL MEDICINE

## 2025-01-30 PROCEDURE — 94760 N-INVAS EAR/PLS OXIMETRY 1: CPT

## 2025-01-30 PROCEDURE — 94060 EVALUATION OF WHEEZING: CPT | Performed by: INTERNAL MEDICINE

## 2025-01-30 PROCEDURE — 82947 ASSAY GLUCOSE BLOOD QUANT: CPT

## 2025-01-30 PROCEDURE — 84295 ASSAY OF SERUM SODIUM: CPT

## 2025-01-30 PROCEDURE — 84132 ASSAY OF SERUM POTASSIUM: CPT

## 2025-01-30 PROCEDURE — 85014 HEMATOCRIT: CPT

## 2025-01-30 PROCEDURE — 82330 ASSAY OF CALCIUM: CPT

## 2025-01-30 PROCEDURE — 94729 DIFFUSING CAPACITY: CPT

## 2025-01-30 PROCEDURE — 82803 BLOOD GASES ANY COMBINATION: CPT

## 2025-01-30 PROCEDURE — 94726 PLETHYSMOGRAPHY LUNG VOLUMES: CPT | Performed by: INTERNAL MEDICINE

## 2025-01-30 PROCEDURE — 94726 PLETHYSMOGRAPHY LUNG VOLUMES: CPT

## 2025-01-30 PROCEDURE — 94060 EVALUATION OF WHEEZING: CPT

## 2025-01-30 RX ORDER — ALBUTEROL SULFATE 0.83 MG/ML
2.5 SOLUTION RESPIRATORY (INHALATION) ONCE
Status: COMPLETED | OUTPATIENT
Start: 2025-01-30 | End: 2025-01-30

## 2025-01-30 RX ORDER — ALBUTEROL SULFATE 0.83 MG/ML
2.5 SOLUTION RESPIRATORY (INHALATION) ONCE
Status: CANCELLED | OUTPATIENT
Start: 2025-01-30 | End: 2025-01-30

## 2025-01-30 RX ADMIN — ALBUTEROL SULFATE 2.5 MG: 2.5 SOLUTION RESPIRATORY (INHALATION) at 18:36

## 2025-02-06 ENCOUNTER — RESULTS FOLLOW-UP (OUTPATIENT)
Dept: PULMONOLOGY | Facility: CLINIC | Age: 75
End: 2025-02-06

## 2025-02-08 DIAGNOSIS — I25.83 CORONARY ARTERY DISEASE DUE TO LIPID RICH PLAQUE: ICD-10-CM

## 2025-02-08 DIAGNOSIS — I25.10 CORONARY ARTERY DISEASE DUE TO LIPID RICH PLAQUE: ICD-10-CM

## 2025-02-10 RX ORDER — ROSUVASTATIN CALCIUM 20 MG/1
20 TABLET, COATED ORAL DAILY
Qty: 90 TABLET | Refills: 1 | Status: SHIPPED | OUTPATIENT
Start: 2025-02-10

## 2025-02-12 NOTE — TELEPHONE ENCOUNTER
February 12, 2025         Patient: Antonio Miramontes   YOB: 2011   Date of Visit: 2/12/2025           To Whom it May Concern:    Antonio Miramontes was seen in my clinic on 2/12/2025. He may return to school on 2/14/2025 or sooner if condition improves sooner.   .    If you have any questions or concerns, please don't hesitate to call.        Sincerely,           Fior Nuñez P.A.-C.  Electronically Signed      Pt was advised labs and followup in October  I do not see any in her chart  I can print scripts, but she will have to go for labs and fup thereafter

## 2025-02-19 ENCOUNTER — HOSPITAL ENCOUNTER (OUTPATIENT)
Dept: NON INVASIVE DIAGNOSTICS | Facility: CLINIC | Age: 75
Discharge: HOME | End: 2025-02-19
Payer: MEDICARE

## 2025-02-19 VITALS
DIASTOLIC BLOOD PRESSURE: 82 MMHG | BODY MASS INDEX: 31.07 KG/M2 | WEIGHT: 144 LBS | HEIGHT: 57 IN | HEART RATE: 107 BPM | SYSTOLIC BLOOD PRESSURE: 138 MMHG

## 2025-02-19 DIAGNOSIS — J43.2 CENTRILOBULAR EMPHYSEMA (HCC): Chronic | ICD-10-CM

## 2025-02-19 LAB
AORTIC ROOT: 3.3 CM
ASCENDING AORTA: 2.9 CM
AV LVOT MEAN GRADIENT: 2 MMHG
AV LVOT PEAK GRADIENT: 4 MMHG
BSA FOR ECHO PROCEDURE: 1.56 M2
DOP CALC LVOT PEAK VEL VTI: 18.34 CM
DOP CALC LVOT PEAK VEL: 1.01 M/S
E WAVE DECELERATION TIME: 244 MS
E/A RATIO: 0.79
FRACTIONAL SHORTENING: 33 (ref 28–44)
INTERVENTRICULAR SEPTUM IN DIASTOLE (PARASTERNAL SHORT AXIS VIEW): 0.8 CM
INTERVENTRICULAR SEPTUM: 0.8 CM (ref 0.6–1.1)
LAAS-AP2: 11.5 CM2
LAAS-AP4: 8.5 CM2
LEFT ATRIUM AREA SYSTOLE SINGLE PLANE A4C: 9.3 CM2
LEFT ATRIUM SIZE: 2.9 CM
LEFT ATRIUM VOLUME (MOD BIPLANE): 21 ML
LEFT ATRIUM VOLUME INDEX (MOD BIPLANE): 13.5 ML/M2
LEFT INTERNAL DIMENSION IN SYSTOLE: 2.4 CM (ref 2.1–4)
LEFT VENTRICULAR INTERNAL DIMENSION IN DIASTOLE: 3.6 CM (ref 3.5–6)
LEFT VENTRICULAR POSTERIOR WALL IN END DIASTOLE: 0.6 CM
LEFT VENTRICULAR STROKE VOLUME: 35 ML
LV EF US.2D.A4C+ESTIMATED: 68 %
LVSV (TEICH): 35 ML
MV E'TISSUE VEL-SEP: 8 CM/S
MV PEAK A VEL: 0.75 M/S
MV PEAK E VEL: 59 CM/S
MV STENOSIS PRESSURE HALF TIME: 71 MS
MV VALVE AREA P 1/2 METHOD: 3.1
RA PRESSURE ESTIMATED: 3 MMHG
RIGHT ATRIAL 2D VOLUME: 25 ML
RIGHT ATRIUM AREA SYSTOLE A4C: 11.5 CM2
RIGHT VENTRICLE ID DIMENSION: 3 CM
RV PSP: 26 MMHG
SL CV LEFT ATRIUM LENGTH A2C: 4.2 CM
SL CV LV EF: 60
SL CV PED ECHO LEFT VENTRICLE DIASTOLIC VOLUME (MOD BIPLANE) 2D: 54 ML
SL CV PED ECHO LEFT VENTRICLE SYSTOLIC VOLUME (MOD BIPLANE) 2D: 20 ML
TR MAX PG: 23 MMHG
TR PEAK VELOCITY: 2.4 M/S
TRICUSPID ANNULAR PLANE SYSTOLIC EXCURSION: 2 CM
TRICUSPID VALVE PEAK REGURGITATION VELOCITY: 2.39 M/S

## 2025-02-19 PROCEDURE — 93306 TTE W/DOPPLER COMPLETE: CPT | Performed by: STUDENT IN AN ORGANIZED HEALTH CARE EDUCATION/TRAINING PROGRAM

## 2025-02-19 PROCEDURE — 93306 TTE W/DOPPLER COMPLETE: CPT

## 2025-03-07 ENCOUNTER — OFFICE VISIT (OUTPATIENT)
Dept: PULMONOLOGY | Facility: CLINIC | Age: 75
End: 2025-03-07
Payer: MEDICARE

## 2025-03-07 VITALS
DIASTOLIC BLOOD PRESSURE: 70 MMHG | SYSTOLIC BLOOD PRESSURE: 122 MMHG | BODY MASS INDEX: 31.16 KG/M2 | OXYGEN SATURATION: 93 % | HEART RATE: 113 BPM | TEMPERATURE: 97.4 F | WEIGHT: 144 LBS

## 2025-03-07 DIAGNOSIS — J43.2 CENTRILOBULAR EMPHYSEMA (HCC): Primary | Chronic | ICD-10-CM

## 2025-03-07 DIAGNOSIS — J44.9 COPD, SEVERE (HCC): ICD-10-CM

## 2025-03-07 DIAGNOSIS — J96.11 CHRONIC HYPOXIC RESPIRATORY FAILURE (HCC): ICD-10-CM

## 2025-03-07 PROCEDURE — G2211 COMPLEX E/M VISIT ADD ON: HCPCS | Performed by: INTERNAL MEDICINE

## 2025-03-07 PROCEDURE — 99215 OFFICE O/P EST HI 40 MIN: CPT | Performed by: INTERNAL MEDICINE

## 2025-03-07 RX ORDER — SODIUM CHLORIDE FOR INHALATION 3 %
4 VIAL, NEBULIZER (ML) INHALATION
Qty: 360 ML | Refills: 3 | Status: SHIPPED | OUTPATIENT
Start: 2025-03-07

## 2025-03-07 NOTE — ASSESSMENT & PLAN NOTE
Patient has severe COPD/emphysema and remains dyspneic despite maximal medical therapy with ICS/LABA/LAMA.  She has completed much of the workup necessary to determine candidacy for bronchoscopic lung volume reduction.  Her degree of obstruction and hyperinflation meet criteria, however she was unable to perform diffusion capacity maneuver.  Previously measured, was low at 25% of predicted.  I would like to repeat attempt to obtain diffusion capacity.  She also requires quantitative SPECT-CT to evaluate perfusion pattern.  It appears that her left upper lobe would be primary target for valves.    In an effort to optimize her pulmonary status and reduce her mucus burden, I have prescribed 3% saline to use in the nebulizer at least 3 times per day.  If we are to move forward with valves, I would perform sputum cultures a few weeks prior to ensure there are no organisms that would require preemptive antibiotics.    She completed pulmonary rehabilitation approximately 8 years ago and did not find it beneficial.  However, we discussed that patients with no success performed pulmonary rehabilitation for at least 3-4 weeks prior to having valves and thereafter.  I have placed referral for her to enroll in rehab.    Finally, I would like Dr. Turpin to review her most recent echocardiogram, specifically related to dilated right ventricle.  Although no evidence of pulmonary hypertension based on degree of tricuspid regurgitation, I want to ensure that there would be no role for right heart catheterization prior to moving forward with valves.    Orders:    NM lung quantative perfusion w spect ct; Future    XR chest pa and lateral; Future    Spirometry with diffusing capacity; Future    Ambulatory Referral to Pulmonary Rehabilitation; Future

## 2025-03-07 NOTE — PROGRESS NOTES
Follow-up  Visit - Pulmonary Medicine   Name: Susanna Guevara      : 1950      MRN: [id number]  Encounter Provider: Eliane Jimenez DO  Encounter Date: 3/7/2025   Encounter department: [facility]  :  Assessment & Plan  Centrilobular emphysema (HCC)  Patient has severe COPD/emphysema and remains dyspneic despite maximal medical therapy with ICS/LABA/LAMA.  She has completed much of the workup necessary to determine candidacy for bronchoscopic lung volume reduction.  Her degree of obstruction and hyperinflation meet criteria, however she was unable to perform diffusion capacity maneuver.  Previously measured, was low at 25% of predicted.  I would like to repeat attempt to obtain diffusion capacity.  She also requires quantitative SPECT-CT to evaluate perfusion pattern.  It appears that her left upper lobe would be primary target for valves.    In an effort to optimize her pulmonary status and reduce her mucus burden, I have prescribed 3% saline to use in the nebulizer at least 3 times per day.  If we are to move forward with valves, I would perform sputum cultures a few weeks prior to ensure there are no organisms that would require preemptive antibiotics.    She completed pulmonary rehabilitation approximately 8 years ago and did not find it beneficial.  However, we discussed that patients with no success performed pulmonary rehabilitation for at least 3-4 weeks prior to having valves and thereafter.  I have placed referral for her to enroll in rehab.    Finally, I would like Dr. Turpin to review her most recent echocardiogram, specifically related to dilated right ventricle.  Although no evidence of pulmonary hypertension based on degree of tricuspid regurgitation, I want to ensure that there would be no role for right heart catheterization prior to moving forward with valves.    Orders:    NM lung quantative perfusion w spect ct; Future    XR chest pa and lateral; Future     Spirometry with diffusing capacity; Future    Ambulatory Referral to Pulmonary Rehabilitation; Future    Chronic hypoxic respiratory failure (HCC)  Patient requires oxygen at 3 L/min at rest and as much as 6 L/min with exertion.         Paperwork was signed today and will be submitted if final workup suggests she is a candidate.    History of Present Illness     Referring provider: Dr. Thmopson      History of Present Illness   HPI:  Susanna Guevara is a 74 y.o. female who is here today to discuss bronchoscopic lung volume reduction.  Patient was initially diagnosed with COPD approximately 8 years ago when she was hospitalized with COPD exacerbation.  She has been on supplemental oxygen requiring between 3-6 L/min, depending on level of activity.  She has not required hospitalization since that initial hospital stay.  She underwent pulmonary rehabilitation back then, but did not find it beneficial.  She leads a fairly sedentary life, unable to do any household chores aside from light dusting.  She has shortness of breath with even minimal exertion.  She has chronic cough and daily sputum production.  Sputum is clear in color.  Denies hemoptysis.  She has no chest pain or palpitations.  She has no lower extremity edema.  She follows with cardiology due to tachycardia.  She alternates between Trelegy and Breztri, depending on sample availability    Review of Systems otherwise negative    Historical Information   Past Medical History:   Diagnosis Date    COPD (chronic obstructive pulmonary disease) (HCC)     Diabetes mellitus (HCC)     Hyperlipidemia     Pulmonary nodule 8/31/2017     Past Surgical History:   Procedure Laterality Date    HAND SURGERY      TENDON TRANSFER      Left lower arm     Family History   Problem Relation Age of Onset    Liver disease Mother     Substance Abuse Father     COPD Sister     Diabetes Sister     No Known Problems Sister     No Known Problems Daughter     No Known Problems Daughter      No Known Problems Son      Social History     Tobacco Use   Smoking Status Former    Current packs/day: 0.00    Average packs/day: 1 pack/day for 47.0 years (47.0 ttl pk-yrs)    Types: Cigarettes    Start date:     Quit date: 2017    Years since quittin.1   Smokeless Tobacco Never       Meds/Allergies     Current Outpatient Medications:     albuterol (PROVENTIL HFA,VENTOLIN HFA) 90 mcg/act inhaler, INHALE 2 PUFFS BY MOUTH EVERY 4 HOURS AS NEEDED FOR WHEEZE, Disp: 6.7 g, Rfl: 5    Calcium 500-100 MG-UNIT CHEW, Chew 1 tablet daily, Disp: , Rfl:     fluticasone-umeclidinium-vilanterol (Trelegy Ellipta) 100-62.5-25 mcg/actuation inhaler, Inhale 1 puff daily Rinse mouth after use., Disp: 3 each, Rfl: 0    levalbuterol (XOPENEX) 1.25 mg/3 mL nebulizer solution, TAKE 1 VIAL BY NEBULIZATION EVERY 8 (EIGHT) HOURS AS NEEDED FOR WHEEZING OR SHORTNESS OF BREATH, Disp: 810 mL, Rfl: 2    metFORMIN (GLUCOPHAGE) 500 mg tablet, TAKE 1 TABLET BY MOUTH EVERY DAY WITH BREAKFAST, Disp: 90 tablet, Rfl: 1    rosuvastatin (CRESTOR) 20 MG tablet, TAKE 1 TABLET BY MOUTH EVERY DAY, Disp: 90 tablet, Rfl: 1    ibuprofen (MOTRIN) 600 mg tablet, Take 600 mg by mouth 3 (three) times a day as needed (Patient not taking: Reported on 2023), Disp: , Rfl:   Allergies   Allergen Reactions    Prednisone      Facial redness and sob    Medrol [Methylprednisolone] Angioedema       Vitals: Blood pressure 122/70, pulse (!) 113, temperature (!) 97.4 °F (36.3 °C), temperature source Temporal, weight 65.3 kg (144 lb), SpO2 93%., Body mass index is 31.16 kg/m². Oxygen Therapy  SpO2: 93 %    Physical Exam   Physical Exam  Constitutional:       General: She is not in acute distress.     Appearance: Normal appearance.   HENT:      Head: Normocephalic.      Mouth/Throat:      Pharynx: No oropharyngeal exudate.   Eyes:      General: No scleral icterus.  Neck:      Vascular: No JVD.   Cardiovascular:      Rate and Rhythm: Normal rate and regular rhythm.  "  Pulmonary:      Breath sounds: No wheezing, rhonchi or rales.   Musculoskeletal:         General: No deformity.   Skin:     General: Skin is warm and dry.   Neurological:      Mental Status: She is alert and oriented to person, place, and time.   Psychiatric:         Mood and Affect: Mood normal.         Labs: I have personally reviewed pertinent lab results.  Lab Results   Component Value Date    WBC 6.4 09/26/2018    HGB 13.3 01/30/2025    HCT 39 01/30/2025    MCV 90.0 09/26/2018     09/26/2018     Lab Results   Component Value Date    GLUCOSE 146 (H) 01/30/2025    CALCIUM 9.5 11/07/2024    K 3.9 11/07/2024    CO2 24 01/30/2025     11/07/2024    BUN 15 11/07/2024    CREATININE 0.76 11/07/2024     No results found for: \"IGE\"  Lab Results   Component Value Date    ALT 14 11/07/2024    AST 17 11/07/2024    ALKPHOS 76 11/07/2024       Imaging and other studies: I have personally reviewed the following studies    Chest CT - date 12/17/2024  Moderate emphysema    Pulmonary function testing:  Performed 1/31/2025  FEV1/FVC ratio 47%    FEV1 36% predicted  FVC 60% predicted  (+) response to bronchodilators  Post BD FEV1 43% predicted   % predicted   % predicted  DLCO corrected for hemoglobin unable to perform maneuver    ABG -1/30/2025- pH 7.415/ pCO2 36.3/ pO2 66 on room air    6WMT   Date 1/31/2025  Distance walked 234 m, 6 L    Pulmonary Rehabilitation - completed 8 years ago    Other Studies:     2D echocardiogram -2/19/2025  EF 60%, right ventricle dilated, RVSP 26 mmHg        I spent 45 minutes with the patient and  counseling and coordinating care:  reviewing all available patient data related to procedure, personally reviewing imaging, studies and pulmonary function testing and discussing the procedure of bronchoscopic lung volume reduction.  I shared an informational presentation and reviewed data from the LIBERATE trial.  They understand that nearly 50% of patients will have a " 15% improvement in FEV1.  Patients will generally have improved exercise tolerance and quality of life related to COPD after treatment.  We also discussed the risks, notably a 30% risk of pneumothorax in the postprocedure period and 3% risk of death in LIBERATE trial.  Additional risks include COPD exacerbation, pneumonia and increased supplemental oxygen needs. Patient will require a minimum of a 4 day / 3 night hospitalization to monitor for postprocedure complications. Patient also provided with literature and web site resource regarding [place] valve treatment

## 2025-03-12 ENCOUNTER — TELEPHONE (OUTPATIENT)
Dept: CARDIOLOGY CLINIC | Facility: CLINIC | Age: 75
End: 2025-03-12

## 2025-03-12 NOTE — TELEPHONE ENCOUNTER
Patient scheduled for RIGHT  Heart Cath on 3/13/25 at [facility] with Dr. VELEZ.      Patient aware of all general instructions.    Medication holds:     Metformin - Take your regular dose day/evening before procedure and do not take morning of procedure.      Labs to be done on ARRIVAL.   CMP / CBC (fasting 8 hours)

## 2025-03-12 NOTE — TELEPHONE ENCOUNTER
----- Message from Ramsey Turpin MD sent at 3/12/2025  1:50 PM EDT -----  Regarding: FW: ? RHC ?  Pls set up for RHC with me 3/13 at 7:30am at . Per Dr. Jimenez  ----- Message -----  From: Eliane Jimenez DO  Sent: 3/11/2025  11:00 PM EDT  To: Ramsey Turpin MD  Subject: RE: ? RHC ?                                      Great. Can your team reach out to her? I'm off tomorrow and Thursday but she is aware that team will be calling. Thanks so much  ----- Message -----  From: Ramsey Turpin MD  Sent: 3/11/2025   1:17 PM EDT  To: Eliane Jimenez, DO  Subject: RE: ? RHC ?                                      Sounds good. If we schedule the case for 7:30am that should def work  ----- Message -----  From: Eliane Jimenez DO  Sent: 3/10/2025   4:03 PM EDT  To: Ramsey Turpin MD  Subject: RE: ? RHC ?                                      Awesome - I just spoke with her and she said she could do Thursday but would need to be in nuclear medicine here in [place] for an 11:00 SPECT-CT scan that is already scheduled.  Think we could make that work?  ----- Message -----  From: Ramsey Turpin MD  Sent: 3/10/2025   1:39 PM EDT  To: Eliane Jimenez, DO  Subject: RE: ? RHC ?                                      Hi. Yes I think RHC would be reasonable here, given the discrepancy between the RV dilation (with normal function) and lack of PH signal by echo.    If she can come in this Thursday of Friday at 7:30am to [facility] I can get it done.    Thnx  ----- Message -----  From: Eliane Jimenez DO  Sent: 3/7/2025   4:08 PM EDT  To: Ramsey Turpin MD  Subject: ? RHC ?                                          Ramsey,    This patient is known to you, more recently following with Dr. Graves.  She is in the process of being evaluated for endobronchial valves.  I noticed that her right ventricle is dilated on echocardiogram but with reported normal RVSP.  Can you take a look and let me know if you think it would be prudent to perform right  heart catheterization prior to pursuing valves.  She does require up to 6 L/min, so would want to be as short as we can be the pulmonary hypertension is not playing a role here.    Thanks    Glenda

## 2025-03-13 ENCOUNTER — TELEPHONE (OUTPATIENT)
Age: 75
End: 2025-03-13

## 2025-03-13 ENCOUNTER — HOSPITAL ENCOUNTER (OUTPATIENT)
Facility: HOSPITAL | Age: 75
Setting detail: OUTPATIENT SURGERY
Discharge: HOME | End: 2025-03-13
Attending: STUDENT IN AN ORGANIZED HEALTH CARE EDUCATION/TRAINING PROGRAM | Admitting: STUDENT IN AN ORGANIZED HEALTH CARE EDUCATION/TRAINING PROGRAM
Payer: MEDICARE

## 2025-03-13 VITALS
OXYGEN SATURATION: 98 % | WEIGHT: 144 LBS | DIASTOLIC BLOOD PRESSURE: 81 MMHG | BODY MASS INDEX: 29.03 KG/M2 | RESPIRATION RATE: 18 BRPM | HEIGHT: 59 IN | SYSTOLIC BLOOD PRESSURE: 125 MMHG | TEMPERATURE: 98.7 F | HEART RATE: 89 BPM

## 2025-03-13 DIAGNOSIS — J96.11 CHRONIC HYPOXIC RESPIRATORY FAILURE (HCC): ICD-10-CM

## 2025-03-13 LAB
ANION GAP SERPL CALCULATED.3IONS-SCNC: 7 MMOL/L (ref 4–13)
BUN SERPL-MCNC: 12 MG/DL (ref 5–25)
CALCIUM SERPL-MCNC: 9 MG/DL (ref 8.4–10.2)
CHLORIDE SERPL-SCNC: 104 MMOL/L (ref 96–108)
CO2 SERPL-SCNC: 29 MMOL/L (ref 21–32)
CREAT SERPL-MCNC: 0.61 MG/DL (ref 0.6–1.3)
GFR SERPL CREATININE-BSD FRML MDRD: 88 ML/MIN/1.73SQ M
GLUCOSE P FAST SERPL-MCNC: 137 MG/DL (ref 65–99)
GLUCOSE SERPL-MCNC: 137 MG/DL (ref 65–140)
INR PPP: 0.84 (ref 0.85–1.19)
POTASSIUM SERPL-SCNC: 3.8 MMOL/L (ref 3.5–5.3)
PROTHROMBIN TIME: 11.9 SECONDS (ref 12.3–15)
SODIUM SERPL-SCNC: 140 MMOL/L (ref 135–147)

## 2025-03-13 PROCEDURE — C1769 GUIDE WIRE: HCPCS | Performed by: STUDENT IN AN ORGANIZED HEALTH CARE EDUCATION/TRAINING PROGRAM

## 2025-03-13 PROCEDURE — 85610 PROTHROMBIN TIME: CPT | Performed by: STUDENT IN AN ORGANIZED HEALTH CARE EDUCATION/TRAINING PROGRAM

## 2025-03-13 PROCEDURE — 93451 RIGHT HEART CATH: CPT | Performed by: STUDENT IN AN ORGANIZED HEALTH CARE EDUCATION/TRAINING PROGRAM

## 2025-03-13 PROCEDURE — 82810 BLOOD GASES O2 SAT ONLY: CPT | Performed by: STUDENT IN AN ORGANIZED HEALTH CARE EDUCATION/TRAINING PROGRAM

## 2025-03-13 PROCEDURE — 80048 BASIC METABOLIC PNL TOTAL CA: CPT | Performed by: STUDENT IN AN ORGANIZED HEALTH CARE EDUCATION/TRAINING PROGRAM

## 2025-03-13 PROCEDURE — NC001 PR NO CHARGE: Performed by: STUDENT IN AN ORGANIZED HEALTH CARE EDUCATION/TRAINING PROGRAM

## 2025-03-13 PROCEDURE — C1894 INTRO/SHEATH, NON-LASER: HCPCS | Performed by: STUDENT IN AN ORGANIZED HEALTH CARE EDUCATION/TRAINING PROGRAM

## 2025-03-13 PROCEDURE — 76937 US GUIDE VASCULAR ACCESS: CPT | Performed by: STUDENT IN AN ORGANIZED HEALTH CARE EDUCATION/TRAINING PROGRAM

## 2025-03-13 RX ORDER — LIDOCAINE HYDROCHLORIDE 10 MG/ML
INJECTION, SOLUTION EPIDURAL; INFILTRATION; INTRACAUDAL; PERINEURAL CODE/TRAUMA/SEDATION MEDICATION
Status: DISCONTINUED | OUTPATIENT
Start: 2025-03-13 | End: 2025-03-13 | Stop reason: HOSPADM

## 2025-03-13 NOTE — Clinical Note
Post procedure Susanna is fully awake alert responsive talkative and appropriate.  Spoke with Dyana and findings with plan of care discussed.  Readied for transfer

## 2025-03-13 NOTE — TELEPHONE ENCOUNTER
Patient had to cancel their nuclear test and they will have reschedule as they are in the cath lab.

## 2025-03-13 NOTE — Clinical Note
Spoke with MD Dyana and informed consent obtained for procedure.  Dyspneic with minimal exertion, able to lay flat remains on O2 chronically, without complaints pain.  Pleasant talkative and cooperative

## 2025-03-13 NOTE — H&P
"Advanced Heart Failure/Pulmonary Hypertension Inpatient Note - Susanna Guevara 74 y.o. female MRN: [id number]    Unit/Bed#: [facility] CATH LAB ROOM Encounter: [id number]    Assessment:  74 y.o. female PMH and acute problems listed later in this note (a partial list may also be included within 'assessment' section) p/w elective RHC per pulmonology in preparation for possible BLVR.    The patient's primary cardiac team is general cardiology, follows Dr. Levine.  RV dilation, nl fxn  Preserved LVEF  Emphysema      Today I have reviewed all pertinent labs/imaging/data including but not limited to:        Weight (last 2 days)       None           No intake or output data in the 24 hours ending 03/13/25 0636        Lab Results   Component Value Date    K 3.9 11/07/2024     Lab Results   Component Value Date    HGBA1C 7.1 (H) 11/07/2024     No results found for: \"OCJ3TOGRUDOV\", \"TSH\"  Lab Results   Component Value Date    LDLCALC 54 11/07/2024     No results found for: \"BNP\"   Lab Results   Component Value Date    NTBNP 57 08/28/2017                 Drips:  No current facility-administered medications for this encounter.       Plan:  Planned for elective RHC today    Studies:  Today I have reviewed all pertinent patient data/labs/imaging where available, including but not limited to the below studies. This includes my independent interpretation. Selected results may be displayed here but comprehensive listing is omitted for note clarity and can be found in the epic chart.    ECG.    Echo.    Stress.    Cath.    HPI:   74 y.o. female PMH and acute problems listed later in this note (a partial list may also be included within 'assessment' section) p/w elective RHC per pulmonology in preparation for possible BLVR.        ROS:  10 point ROS negative except as specified in HPI    Past Medical History:   Diagnosis Date    COPD (chronic obstructive pulmonary disease) (HCC)     Diabetes mellitus (HCC)     Hyperlipidemia     Pulmonary " nodule 2017     Patient Active Problem List   Diagnosis    Dyspnea    COPD, severe (HCC)    Type 2 diabetes mellitus without complication, without long-term current use of insulin (HCC)    HLD (hyperlipidemia)    Lung nodule    Musculoskeletal neck pain    Need for pneumococcal vaccination    Tobacco abuse, in remission    Chronic tachycardia    BMI 28.0-28.9,adult    Chronic hypoxic respiratory failure (HCC)     No current facility-administered medications for this encounter.      Allergies   Allergen Reactions    Prednisone      Facial redness and sob    Medrol [Methylprednisolone] Angioedema     Social History     Socioeconomic History    Marital status: /Civil Union     Spouse name: Not on file    Number of children: Not on file    Years of education: Not on file    Highest education level: Not on file   Occupational History    Not on file   Tobacco Use    Smoking status: Former     Current packs/day: 0.00     Average packs/day: 1 pack/day for 47.0 years (47.0 ttl pk-yrs)     Types: Cigarettes     Start date:      Quit date:      Years since quittin.2    Smokeless tobacco: Never   Vaping Use    Vaping status: Never Used   Substance and Sexual Activity    Alcohol use: No    Drug use: No    Sexual activity: Not on file   Other Topics Concern    Not on file   Social History Narrative    Most recent tobacco use screenin2017     Social Drivers of Health     Financial Resource Strain: Medium Risk (2023)    Overall Financial Resource Strain (CARDIA)     Difficulty of Paying Living Expenses: Somewhat hard   Food Insecurity: No Food Insecurity (2024)    Hunger Vital Sign     Worried About Running Out of Food in the Last Year: Never true     Ran Out of Food in the Last Year: Never true   Transportation Needs: No Transportation Needs (2024)    PRAPARE - Transportation     Lack of Transportation (Medical): No     Lack of Transportation (Non-Medical): No   Physical Activity:  "Not on file   Stress: Not on file   Social Connections: Not on file   Intimate Partner Violence: Not on file   Housing Stability: Low Risk  (11/13/2024)    Housing Stability Vital Sign     Unable to Pay for Housing in the Last Year: No     Number of Times Moved in the Last Year: 1     Homeless in the Last Year: No     Family History   Problem Relation Age of Onset    Liver disease Mother     Substance Abuse Father     COPD Sister     Diabetes Sister     No Known Problems Sister     No Known Problems Daughter     No Known Problems Daughter     No Known Problems Son        Tele: reviewed    Objective:     Physical Exam:       Weight (last 2 days)       None           No intake or output data in the 24 hours ending 03/13/25 0636  Constitutional: NAD, non toxic  Ears/nose/mouth/throat: atraumatic  CV: RRR, no JVD  Resp: Decreased breath sounds BL  GI: Soft, NTND  MSK: no swollen joints in exposed areas  Extr: No edema, warm LE  Pysche: Normal affect  Neuro: appropriate in conversation  Skin: dry and intact in exposed areas     Data:             No results found for: \"LDH\"    Counseling / Coordination of Care:  Greater than 50% of total time was spent with the patient and / or family counseling and / or coordination of care.  A description of the counseling / coordination of care: we discussed diagnoses, recent as well as older studies, labs, and all changes in cardiac treatment plan. All patient questions were answered.     Thank you for the opportunity to participate in the care of this patient.    Ramsey Turpin MD  Attending Physician  Advanced Heart Failure and Transplant Cardiology  [facility]    "

## 2025-03-19 ENCOUNTER — HOSPITAL ENCOUNTER (OUTPATIENT)
Dept: RADIOLOGY | Facility: HOSPITAL | Age: 75
Discharge: HOME | End: 2025-03-19
Attending: INTERNAL MEDICINE
Payer: MEDICARE

## 2025-03-19 DIAGNOSIS — J43.2 CENTRILOBULAR EMPHYSEMA (HCC): Chronic | ICD-10-CM

## 2025-03-19 PROCEDURE — 78830 RP LOCLZJ TUM SPECT W/CT 1: CPT

## 2025-03-19 PROCEDURE — 78597 LUNG PERFUSION DIFFERENTIAL: CPT

## 2025-03-19 PROCEDURE — A9540 TC99M MAA: HCPCS

## 2025-03-20 ENCOUNTER — HOSPITAL ENCOUNTER (OUTPATIENT)
Dept: PULMONOLOGY | Facility: HOSPITAL | Age: 75
Discharge: HOME | End: 2025-03-20
Attending: INTERNAL MEDICINE
Payer: MEDICARE

## 2025-03-20 DIAGNOSIS — J43.2 CENTRILOBULAR EMPHYSEMA (HCC): Chronic | ICD-10-CM

## 2025-03-20 PROCEDURE — 94010 BREATHING CAPACITY TEST: CPT

## 2025-03-20 PROCEDURE — 94729 DIFFUSING CAPACITY: CPT

## 2025-03-20 PROCEDURE — 94729 DIFFUSING CAPACITY: CPT | Performed by: INTERNAL MEDICINE

## 2025-03-20 PROCEDURE — 94010 BREATHING CAPACITY TEST: CPT | Performed by: INTERNAL MEDICINE

## 2025-03-20 PROCEDURE — 94760 N-INVAS EAR/PLS OXIMETRY 1: CPT

## 2025-03-20 RX ORDER — ALBUTEROL SULFATE 0.83 MG/ML
2.5 SOLUTION RESPIRATORY (INHALATION) ONCE
Status: COMPLETED | OUTPATIENT
Start: 2025-03-20 | End: 2025-03-20

## 2025-03-20 RX ADMIN — ALBUTEROL SULFATE 2.5 MG: 2.5 SOLUTION RESPIRATORY (INHALATION) at 16:44

## 2025-03-26 ENCOUNTER — TELEPHONE (OUTPATIENT)
Dept: PULMONOLOGY | Facility: CLINIC | Age: 75
End: 2025-03-26

## 2025-03-28 NOTE — TELEPHONE ENCOUNTER
Dr. WAN will be performing a ENDOBRONCHIAL VALVE PLACEMENT on Susanna Guevara on 04/28/2025     LOCATION: [facility]    ANTICOAGULATION INSTRUCTIONS: NONE    OTHER MEDICATION INSTRUCTIONS: NONE    LABS: (CBC/PT/PTT in last 90 days): N/A   (If no, labs ordered / to be done at least one day prior to procedure)    LAST OFFICE NOTE/H&P within 30 days of procedure: 03/07    INSTRUCTIONS GIVEN: Spoke with patient advised of procedure date and location. Patient aware she is to be NPO after midnight the night prior. Patient aware she will be inpatient for 3-4 days after procedure. Patient aware she will receive a call the day before with time of arrival.    Thank You   Carolee Barnett

## 2025-03-30 DIAGNOSIS — J44.9 CHRONIC OBSTRUCTIVE PULMONARY DISEASE, UNSPECIFIED COPD TYPE (HCC): Chronic | ICD-10-CM

## 2025-03-31 RX ORDER — ALBUTEROL SULFATE 90 UG/1
INHALANT RESPIRATORY (INHALATION)
Qty: 8 G | Refills: 5 | Status: SHIPPED | OUTPATIENT
Start: 2025-03-31

## 2025-03-31 NOTE — TELEPHONE ENCOUNTER
Refill must be reviewed and completed by the office or provider. The refill is unable to be approved or denied by the medication management team.    Albuterol as needed for wheezing - Please review to see if the refill is appropriate.

## 2025-04-07 ENCOUNTER — CLINICAL SUPPORT (OUTPATIENT)
Dept: PULMONOLOGY | Facility: CLINIC | Age: 75
End: 2025-04-07
Payer: MEDICARE

## 2025-04-07 DIAGNOSIS — J43.2 CENTRILOBULAR EMPHYSEMA (HCC): Primary | Chronic | ICD-10-CM

## 2025-04-07 PROCEDURE — 94625 PHY/QHP OP PULM RHB W/O MNTR: CPT

## 2025-04-07 NOTE — PROGRESS NOTES
Pulmonary Rehabilitation   Assessment and Individualized Treatment Plan  INITIAL       Today's date: 2025  # of Exercise Sessions Completed: initial + 1   Patient name: Susanna Guevara     : 1950       MRN: [id number]  Referring Physician: Eliane Jimenez DO  Pulmonologist: Stiven Rocha  Provider: Chapin  Clinician: Alexy Garay MD,CEP,EPC    Dx:    Encounter Diagnosis   Name Primary?    Centrilobular emphysema (HCC) Yes     Date of onset: 25      Treatment is tailored to this patient's individual needs.  The ITP was reviewed with the patient and all questions were answered to their satisfaction.  Additional ITP documentation can be found electronically including daily and monthly exercise summaries, daily session notes with ECG summaries, education notes, daily medication reconciliation, and daily physician supervision.      INITIAL EVALUATION SUMMARY 2025    Patient's subjective report of progress/symptoms: feels fatigued as soon as she gets up to walk   Home exercise/ADLs: no formal exercise,  helps her with completing her moderate to heavy ADL's  Clinical Comments: none    Initial Fitness Assessment: 6MWT:  Resting:    Resting:  BP: 122/78  HR: 95  SpO2: 98  Dyspnea: 0  Exercise:  BP: 138/84  HR: 102-129  SpO2: 85-90%  Dyspnea: 2-6  O2 use: 3 l/min  Symptoms: SOBOE,fatigue       ASSESSMENT      Medical History:   Past Medical History:   Diagnosis Date    COPD (chronic obstructive pulmonary disease) (HCC)     Diabetes mellitus (HCC)     Hyperlipidemia     Pulmonary nodule 2017       Family History:  Family History   Problem Relation Age of Onset    Liver disease Mother     Substance Abuse Father     COPD Sister     Diabetes Sister     No Known Problems Sister     No Known Problems Daughter     No Known Problems Daughter     No Known Problems Son        Allergies:   Prednisone and Medrol [methylprednisolone]    Current Medications:   Current Outpatient  Medications   Medication Sig Dispense Refill    albuterol (PROVENTIL HFA,VENTOLIN HFA) 90 mcg/act inhaler INHALE 2 PUFFS BY MOUTH EVERY 4 HOURS AS NEEDED FOR WHEEZING 8 g 5    Calcium 500-100 MG-UNIT CHEW Chew 1 tablet daily      fluticasone-umeclidinium-vilanterol (Trelegy Ellipta) 100-62.5-25 mcg/actuation inhaler Inhale 1 puff daily Rinse mouth after use. 3 each 0    ibuprofen (MOTRIN) 600 mg tablet Take 600 mg by mouth 3 (three) times a day as needed (Patient not taking: Reported on 5/4/2023)      levalbuterol (XOPENEX) 1.25 mg/3 mL nebulizer solution TAKE 1 VIAL BY NEBULIZATION EVERY 8 (EIGHT) HOURS AS NEEDED FOR WHEEZING OR SHORTNESS OF BREATH 810 mL 2    metFORMIN (GLUCOPHAGE) 500 mg tablet TAKE 1 TABLET BY MOUTH EVERY DAY WITH BREAKFAST 90 tablet 1    rosuvastatin (CRESTOR) 20 MG tablet TAKE 1 TABLET BY MOUTH EVERY DAY 90 tablet 1    sodium chloride 3 % inhalation solution Take 4 mL by nebulization 3 (three) times a day 360 mL 3     No current facility-administered medications for this visit.       Physical Limitations: none    Fall Risk: Moderate   Comments: Ambulates with a steady gait with no assist device and Denies a fall in the past 6 months    Cultural needs: none    PFT:  Yes     FEV1/FVC ratio of 47.88%   FEV1 of 36% predicted  moderate obstruction.    Medication compliance: Yes  Comments: Pt reports to be compliant with medications      Pulmonary Disease Risk Factors:  Past smoker over 8 years ago    Sleep Disorders:   obstructive sleep apnea:  CPCP/BiPAP:  no      EXERCISE ASSESSMENT:      Current Functional Status:  Occupation: unemployed  Recreation/Physical activity: enjoys walking   ADL’s:Capable of performing light ADLs only cooks,dusting   [place]: able to perform self-care tough time with stairs   Exercise:  Type: body weight exercise, Frequency: 5 days/week, Duration: 10-15 mins  Home exercise equipment: exercise bike   Other Comments: none     SMART Exercise Goals:   reduced score on  CAT, improved 6MWT distance, reduced dyspnea during exercise, improved exercise tolerance based on peak METs tolerated in pulmonary rehab exercise session, SpO2 >88% during exercise, and reduced RPD at rest    Patient Specific EXERCISE GOALS:     reduced dependence on supplemental O2, reduced number of COPD exacerbations, attend pulmonary rehab regularly, decrease sitting time at home, start a walking program, begin a consistent exercise regimen , reduced pulmonary related hospital readmissions , decreased rest needed with physical activity/exercise, increased muscular strength, increased energy, increased stamina with ADLs, and more independence at home    PSYCHOSOCIAL ASSESSMENT:    Date of last assessment: 4/7/25  Depression screening:  PHQ-9 = 2    Interpretation:  1-4 = Minimal Depression  Anxiety screening:  EMIL-7 = 0    Interpretation: 0-4  = Not anxious    Pt self-report of depression and anxiety   Patient reports they are coping well with good social support and denies depression or anxiety  Patient does not have sufficient emotional support    Self-reported stress level:  8  Stress Management: practice Relaxation Techniques, time management, spend time outside, enjoy a hobby, spend time with family, and TV    Quality of Life Screen:  (Higher score indicates disease impact on QOL)  [facility] COOP score: 25/40      CAT: 21/40      Shortness of breath questionnaire: 60/120    Social Support:   significant other, children, and grandchildren  Community/Social Activities: none    SMART Goals:    Social Support in [facility] Score < 3, Daily Activity in [facility] Score < 3, Pain in [place] Score < 3, Overall Health in [facility] Score < 3, Change in Health in [facility] Score < 3 , Improved appetite control, improved concentration, and control or stop worrying    Patient Specific PSYCHOCOSOCIAL GOALS:    maintain compliance with medical therapy, begin using [employer], and spend time with family     Psychosocial  "Assessment as it relates to rehabilitation: Patient denies issues with his/her family or home life that may affect their rehabilitation efforts.       NUTRITION ASSESSMENT:    Initial Weight:  140  Current Weight:     Height:   Ht Readings from Last 1 Encounters:   03/13/25 4' 11\" (1.499 m)       Rate Your Plate Score: 54/81    Self-reported Current Dietary Habits:  Skips breakfast   Two meals a day  Chicken twice a week  Eats process foods a few days/week   Once a days eats sweets     ETOH:   Social History     Substance and Sexual Activity   Alcohol Use No       SMART Goals:   reduced BMI to < 25, decreased body fat % <33%  (F), Improved Rate Your Plate score  >58, eat 6 or more servings of grain products per day, eat whole grain breads, brown rice and whole grain cereals, eat 5 or more servings of fruits and vegetables a day, eat 2 or more servings of low fat milk or yogurt a day, choose lean beef or rarely eat beef, rarely eat processed meats or eat low fat processed meats, eat poultry without the skin, eat chicken and fish that is not fried, eat meatless meals twice a week or more, choose 1% or skim milk, rarely eat frozen desserts or choose fruit or fat-free sweets, Do not cook with oil, butter or margarine, Do not eat fried foods, and choose light or fat-free salad dressings or [place]    Patient Specific NUTRITION GOALS:    increase water intake to reduce/thin mucus production reduced SOB while eating eat smaller, more frequent meals decrease caloric intake eat less CO2 producing foods improve hydration eat unsaturated fats and lean protein for healthy weight gain increased muscle mass      OTHER CORE COMPONENT ASSESSMENT:    Hospitalizations in the past year: none    Oxygen needs:   Rest:  supplemental O2 via nasal cannula @ 3L/min   Exercise/physical activity:  supplemental O2 via nasal cannula @ 3L/min   Sleep:   room air    Does Pt monitor home SpO2? yes   Average SpO2 at rest:  94-99%   Average SpO2 with " ADLs/physical activity:  88-95%      Use of Rescue Inhaler: Yes:  3-4 times per days    Use of Maintenance Inhaler: Yes:  2 times per day    Use of Nebulizer Treatments:  Yes:  2 times per day    Patient practices breathing techniques at home:  Yes    CAD Risk Factors:  Cholesterol: Yes  HTN: No  DM: Type 2   Obesity: Yes     Smoking: Former user    SMART Core Component Goals:   consistent, controlled resting BP < 130/80, medication compliance, and abstain from smoking    Patient Specific CORE COMPONENT GOALS:    reduced dietary sodium <2000mg, medication compliance, Abstain from smoking, compliance with supplemental oxygen, and monitor home pulse oximetry      INDIVIDUALIZED TREATMENT PLAN      EXERCISE GOALS AND PLAN    PHYSCIAN PRESCRIBED EXERCISE    Current Aerobic Exercise Prescription       Frequency: 1 days/week   Supplement with home exercise 2+ days/wk as tolerated        Minutes: 6MWT         METS: 750 ft               SpO2: 85-90              RPD: 6                      HR: 102-129   RPE: 2-6         Modalities: Room walking      Aerobic Exercise Prescription Plan for Progression:    Frequency: 3 days/week    Supplement with home exercise 2+ days/wk as tolerated       Minutes: 30-40        METS: 35-40              SpO2: >88%              RPD: 4-6                      HR:RHR +30-40bpm   RPE: 4-5        Modalities: Treadmill, UBE, NuStep, and Recumbent bike        Supplemental Oxygen Needs with Exercise:  supplemental O2 3L/min via nasal canula.    Strength trainin-3 days / week  12-15 repetitions  1-2 sets per modality   Will be added following 2-3 weeks of monitored exercise sessions   Modalities: Chest Press, Pull Downs, Lateral Raise, and Chair Squats    Education: pursed lipped breathing, diaphragmatic breathing, fall prevention while using nasal canula tubing, relaxation breathing, home exercise guidelines, RPE scale, and RPD scale    Progress toward Exercise Goals:    Reviewed Pt goals and  "determined plan of care    Exercise Plan:  Titrate supplemental oxygen as needed to maintain SpO2>88% with exercise, learn to conserve energy with ADLs , practice diaphragmatic breathing, reduce time sitting at home, increased strength of respiratory muscles, utilize PLB with physical activity, begin a home exercise program , and After discharge patient will continue to follow a regular exercise regimen with the goal of a minimum of 150 minutes per week of moderate intensity exercise.      Readiness to change: Preparation:  (Getting ready to change)       NUTRITION GOALS AND PLAN    Progress toward Nutritional goals:    Reviewed Pt goals and determined plan of care, eats processed food a few days/week    Nutrition Plan: group class: Reading Food Labels, group Class: Heart Healthy Eating, reduce intake and /or  rarely eat processed meats , drink/use 1%  low fat or skim  milk, eat reduced-fat or part-skim cheese or rarely eat, rarely eat frozen desserts, cook without fats or oils, never/rarely eat fried foods, use \"light\" tub margarine, use light or fat-free salad dressings and mayonnaise, eat healthy snacks like fruit, pretzels, and low fat crackers, choose desserts such as fruit, simon food cake, low-fat or fat-free sweets, never/rarely add salt to food when cooking or at the table, rarely/never eat salty snacks, choose low sugar desserts and sweets, drink less than 8oz of non-diet soda, punch etc. per day, drink no more than 1-2 alcoholic drinks in a day, decrease carbonated beverages, and decrease gas producing foods    SMART goals are based Rate Your Plate Dietary Self-Assessment. These are the areas in which the patient could score higher on the assessment.  Goals include recommendations for a heart healthy diet based on American Heart Association.    Nutrition Education: heart healthy eating principles  weight loss and management strategies  healthy choices while dining out  portion control    Readiness to " change: Preparation:  (Getting ready to change)       PSYCHOSOCIAL GOALS AND PLAN    Information to begin using [employer] was provided as well as contact information for counseling through  [facility].    Progress toward Psychosocial goals:    Reviewed Pt goals and determined plan of care, has a great social support from  and kids     Psychosocial Plan: Class: Stress and Your Health, Class: Relaxation, Enroll in [employer], Exercise, Spend time outdoors, Keep a positive mindset, puzzles, Enjoy family, and Avoid triggers    Psychosocial Education: signs/sxs of depression, benefits of a positive support system, stress management techniques, and benefits of enrolling in [facility] [place]    Readiness to change: Preparation:  (Getting ready to change)       OTHER CORE COMPONENTS GOALS AND PLAN    Tobacco Use Intervention:     N/A:  Patient is a non-smoker   Pt quit 2016   and has abstained    Oxygen Goal: Maintain SpO2>88% during exercise or as advised by pulmonolgist    Progress toward Core Component goals:    Reviewed Pt goals and determined plan of care    Core Component Plan: group class: Understanding Heart Disease, group class: Common Heart Medications, medication compliance, complete abstention from smoking, avoid places with second hand smoke, avoid processed foods, engage in regular exercise, eliminate salt shaker at the table, check labels for sodium content, and monitor home BP    Core Component Education:  pathophysiology of pulmonary disease, tobacco triggers, setting a smoking cessation plan, relapse education, control coughing, inspiratory muscle training, environmental triggers, nebulizer use, and bronchodilators    Readiness to change: Preparation:  (Getting ready to change)

## 2025-04-14 ENCOUNTER — CLINICAL SUPPORT (OUTPATIENT)
Dept: PULMONOLOGY | Facility: CLINIC | Age: 75
End: 2025-04-14
Attending: INTERNAL MEDICINE
Payer: MEDICARE

## 2025-04-14 DIAGNOSIS — J43.2 CENTRILOBULAR EMPHYSEMA (HCC): Primary | ICD-10-CM

## 2025-04-14 PROCEDURE — 94625 PHY/QHP OP PULM RHB W/O MNTR: CPT

## 2025-04-16 ENCOUNTER — CLINICAL SUPPORT (OUTPATIENT)
Dept: PULMONOLOGY | Facility: CLINIC | Age: 75
End: 2025-04-16
Attending: INTERNAL MEDICINE
Payer: MEDICARE

## 2025-04-16 DIAGNOSIS — J43.2 CENTRILOBULAR EMPHYSEMA (HCC): Primary | ICD-10-CM

## 2025-04-16 PROCEDURE — 94625 PHY/QHP OP PULM RHB W/O MNTR: CPT

## 2025-04-21 ENCOUNTER — CLINICAL SUPPORT (OUTPATIENT)
Dept: PULMONOLOGY | Facility: CLINIC | Age: 75
End: 2025-04-21
Attending: INTERNAL MEDICINE
Payer: MEDICARE

## 2025-04-21 DIAGNOSIS — J43.2 CENTRILOBULAR EMPHYSEMA (HCC): Primary | ICD-10-CM

## 2025-04-21 PROCEDURE — 94625 PHY/QHP OP PULM RHB W/O MNTR: CPT

## 2025-04-23 ENCOUNTER — CLINICAL SUPPORT (OUTPATIENT)
Dept: PULMONOLOGY | Facility: CLINIC | Age: 75
End: 2025-04-23
Attending: INTERNAL MEDICINE
Payer: MEDICARE

## 2025-04-23 DIAGNOSIS — J43.2 CENTRILOBULAR EMPHYSEMA (HCC): Primary | ICD-10-CM

## 2025-04-23 PROCEDURE — 94625 PHY/QHP OP PULM RHB W/O MNTR: CPT

## 2025-04-28 ENCOUNTER — APPOINTMENT (INPATIENT)
Dept: RADIOLOGY | Facility: HOSPITAL | Age: 75
DRG: 163 | End: 2025-04-28
Payer: MEDICARE

## 2025-04-28 ENCOUNTER — ANESTHESIA EVENT (OUTPATIENT)
Dept: PERIOP | Facility: HOSPITAL | Age: 75
DRG: 163 | End: 2025-04-28
Payer: MEDICARE

## 2025-04-28 ENCOUNTER — APPOINTMENT (OUTPATIENT)
Dept: PULMONOLOGY | Facility: CLINIC | Age: 75
End: 2025-04-28
Payer: MEDICARE

## 2025-04-28 ENCOUNTER — HOSPITAL ENCOUNTER (OUTPATIENT)
Dept: RADIOLOGY | Facility: HOSPITAL | Age: 75
Discharge: HOME | DRG: 163 | End: 2025-04-28
Payer: MEDICARE

## 2025-04-28 ENCOUNTER — ANESTHESIA (OUTPATIENT)
Dept: PERIOP | Facility: HOSPITAL | Age: 75
DRG: 163 | End: 2025-04-28
Payer: MEDICARE

## 2025-04-28 ENCOUNTER — HOSPITAL ENCOUNTER (INPATIENT)
Dept: PERIOP | Facility: HOSPITAL | Age: 75
LOS: 3 days | Discharge: HOME | DRG: 163 | End: 2025-05-01
Attending: INTERNAL MEDICINE | Admitting: INTERNAL MEDICINE
Payer: MEDICARE

## 2025-04-28 DIAGNOSIS — J96.11 CHRONIC HYPOXIC RESPIRATORY FAILURE (HCC): ICD-10-CM

## 2025-04-28 DIAGNOSIS — J44.9 COPD, SEVERE (HCC): ICD-10-CM

## 2025-04-28 DIAGNOSIS — R06.09 DYSPNEA ON EXERTION: Primary | ICD-10-CM

## 2025-04-28 PROBLEM — E11.9 TYPE 2 DIABETES MELLITUS WITHOUT COMPLICATION, WITHOUT LONG-TERM CURRENT USE OF INSULIN (HCC): Status: ACTIVE | Noted: 2017-08-28

## 2025-04-28 PROBLEM — E78.5 HLD (HYPERLIPIDEMIA): Status: ACTIVE | Noted: 2017-08-28

## 2025-04-28 LAB
ANION GAP SERPL CALCULATED.3IONS-SCNC: 6 MMOL/L (ref 4–13)
BASOPHILS # BLD AUTO: 0.05 THOUSANDS/ÂΜL (ref 0–0.1)
BASOPHILS NFR BLD AUTO: 1 % (ref 0–1)
BUN SERPL-MCNC: 15 MG/DL (ref 5–25)
CALCIUM SERPL-MCNC: 9.5 MG/DL (ref 8.4–10.2)
CHLORIDE SERPL-SCNC: 102 MMOL/L (ref 96–108)
CO2 SERPL-SCNC: 30 MMOL/L (ref 21–32)
CREAT SERPL-MCNC: 0.65 MG/DL (ref 0.6–1.3)
EOSINOPHIL # BLD AUTO: 0.17 THOUSAND/ÂΜL (ref 0–0.61)
EOSINOPHIL NFR BLD AUTO: 2 % (ref 0–6)
ERYTHROCYTE [DISTWIDTH] IN BLOOD BY AUTOMATED COUNT: 12.3 % (ref 11.6–15.1)
GFR SERPL CREATININE-BSD FRML MDRD: 87 ML/MIN/1.73SQ M
GLUCOSE SERPL-MCNC: 140 MG/DL (ref 65–140)
GLUCOSE SERPL-MCNC: 152 MG/DL (ref 65–140)
HCT VFR BLD AUTO: 39.7 % (ref 34.8–46.1)
HGB BLD-MCNC: 13 G/DL (ref 11.5–15.4)
IMM GRANULOCYTES # BLD AUTO: 0.02 THOUSAND/UL (ref 0–0.2)
IMM GRANULOCYTES NFR BLD AUTO: 0 % (ref 0–2)
LYMPHOCYTES # BLD AUTO: 3.72 THOUSANDS/ÂΜL (ref 0.6–4.47)
LYMPHOCYTES NFR BLD AUTO: 37 % (ref 14–44)
MAGNESIUM SERPL-MCNC: 1.8 MG/DL (ref 1.9–2.7)
MCH RBC QN AUTO: 31.9 PG (ref 26.8–34.3)
MCHC RBC AUTO-ENTMCNC: 32.7 G/DL (ref 31.4–37.4)
MCV RBC AUTO: 97 FL (ref 82–98)
MONOCYTES # BLD AUTO: 0.76 THOUSAND/ÂΜL (ref 0.17–1.22)
MONOCYTES NFR BLD AUTO: 8 % (ref 4–12)
NEUTROPHILS # BLD AUTO: 5.32 THOUSANDS/ÂΜL (ref 1.85–7.62)
NEUTS SEG NFR BLD AUTO: 52 % (ref 43–75)
NRBC BLD AUTO-RTO: 0 /100 WBCS
PLATELET # BLD AUTO: 257 THOUSANDS/UL (ref 149–390)
PMV BLD AUTO: 9.1 FL (ref 8.9–12.7)
POTASSIUM SERPL-SCNC: 3.9 MMOL/L (ref 3.5–5.3)
RBC # BLD AUTO: 4.08 MILLION/UL (ref 3.81–5.12)
SODIUM SERPL-SCNC: 138 MMOL/L (ref 135–147)
WBC # BLD AUTO: 10.04 THOUSAND/UL (ref 4.31–10.16)

## 2025-04-28 PROCEDURE — 87205 SMEAR GRAM STAIN: CPT | Performed by: INTERNAL MEDICINE

## 2025-04-28 PROCEDURE — 99233 SBSQ HOSP IP/OBS HIGH 50: CPT | Performed by: INTERNAL MEDICINE

## 2025-04-28 PROCEDURE — 94640 AIRWAY INHALATION TREATMENT: CPT

## 2025-04-28 PROCEDURE — 87206 SMEAR FLUORESCENT/ACID STAI: CPT | Performed by: INTERNAL MEDICINE

## 2025-04-28 PROCEDURE — 80048 BASIC METABOLIC PNL TOTAL CA: CPT

## 2025-04-28 PROCEDURE — 85025 COMPLETE CBC W/AUTO DIFF WBC: CPT

## 2025-04-28 PROCEDURE — 94664 DEMO&/EVAL PT USE INHALER: CPT

## 2025-04-28 PROCEDURE — 82948 REAGENT STRIP/BLOOD GLUCOSE: CPT

## 2025-04-28 PROCEDURE — 71045 X-RAY EXAM CHEST 1 VIEW: CPT

## 2025-04-28 PROCEDURE — 87116 MYCOBACTERIA CULTURE: CPT | Performed by: INTERNAL MEDICINE

## 2025-04-28 PROCEDURE — 87102 FUNGUS ISOLATION CULTURE: CPT | Performed by: INTERNAL MEDICINE

## 2025-04-28 PROCEDURE — 94760 N-INVAS EAR/PLS OXIMETRY 1: CPT

## 2025-04-28 PROCEDURE — 83735 ASSAY OF MAGNESIUM: CPT

## 2025-04-28 PROCEDURE — 87070 CULTURE OTHR SPECIMN AEROBIC: CPT | Performed by: INTERNAL MEDICINE

## 2025-04-28 PROCEDURE — 31647 BRONCHIAL VALVE INIT INSERT: CPT | Performed by: INTERNAL MEDICINE

## 2025-04-28 PROCEDURE — 0BH88GZ INSERTION OF ENDOBRONCHIAL VALVE INTO LEFT UPPER LOBE BRONCHUS, VIA NATURAL OR ARTIFICIAL OPENING ENDOSCOPIC: ICD-10-PCS | Performed by: INTERNAL MEDICINE

## 2025-04-28 PROCEDURE — C1889 IMPLANT/INSERT DEVICE, NOC: HCPCS

## 2025-04-28 PROCEDURE — 0B9G8ZZ DRAINAGE OF LEFT UPPER LUNG LOBE, VIA NATURAL OR ARTIFICIAL OPENING ENDOSCOPIC: ICD-10-PCS | Performed by: INTERNAL MEDICINE

## 2025-04-28 RX ORDER — SODIUM CHLORIDE, SODIUM LACTATE, POTASSIUM CHLORIDE, CALCIUM CHLORIDE 600; 310; 30; 20 MG/100ML; MG/100ML; MG/100ML; MG/100ML
100 INJECTION, SOLUTION INTRAVENOUS CONTINUOUS
Status: DISCONTINUED | OUTPATIENT
Start: 2025-04-28 | End: 2025-04-28

## 2025-04-28 RX ORDER — ENOXAPARIN SODIUM 100 MG/ML
40 INJECTION SUBCUTANEOUS DAILY
Status: DISCONTINUED | OUTPATIENT
Start: 2025-04-28 | End: 2025-04-28

## 2025-04-28 RX ORDER — ACETAMINOPHEN 325 MG/1
650 TABLET ORAL EVERY 6 HOURS PRN
Status: DISCONTINUED | OUTPATIENT
Start: 2025-04-28 | End: 2025-05-01 | Stop reason: HOSPADM

## 2025-04-28 RX ORDER — LIDOCAINE HYDROCHLORIDE 20 MG/ML
5 INJECTION, SOLUTION EPIDURAL; INFILTRATION; INTRACAUDAL; PERINEURAL ONCE
Status: COMPLETED | OUTPATIENT
Start: 2025-04-28 | End: 2025-04-28

## 2025-04-28 RX ORDER — BUDESONIDE 0.5 MG/2ML
0.5 INHALANT ORAL
Status: DISCONTINUED | OUTPATIENT
Start: 2025-04-28 | End: 2025-05-01 | Stop reason: HOSPADM

## 2025-04-28 RX ORDER — CHLORHEXIDINE GLUCONATE ORAL RINSE 1.2 MG/ML
15 SOLUTION DENTAL EVERY 12 HOURS SCHEDULED
Status: DISCONTINUED | OUTPATIENT
Start: 2025-04-28 | End: 2025-05-01 | Stop reason: HOSPADM

## 2025-04-28 RX ORDER — ALBUTEROL SULFATE 0.83 MG/ML
2.5 SOLUTION RESPIRATORY (INHALATION) ONCE
Status: COMPLETED | OUTPATIENT
Start: 2025-04-28 | End: 2025-04-28

## 2025-04-28 RX ORDER — PHENYLEPHRINE HCL IN 0.9% NACL 1 MG/10 ML
SYRINGE (ML) INTRAVENOUS AS NEEDED
Status: DISCONTINUED | OUTPATIENT
Start: 2025-04-28 | End: 2025-04-28

## 2025-04-28 RX ORDER — ALBUTEROL SULFATE 0.83 MG/ML
SOLUTION RESPIRATORY (INHALATION) AS NEEDED
Status: DISCONTINUED | OUTPATIENT
Start: 2025-04-28 | End: 2025-04-28

## 2025-04-28 RX ORDER — FORMOTEROL FUMARATE 20 UG/2ML
20 SOLUTION RESPIRATORY (INHALATION)
Status: DISCONTINUED | OUTPATIENT
Start: 2025-04-28 | End: 2025-05-01 | Stop reason: HOSPADM

## 2025-04-28 RX ORDER — ONDANSETRON 2 MG/ML
INJECTION INTRAMUSCULAR; INTRAVENOUS AS NEEDED
Status: DISCONTINUED | OUTPATIENT
Start: 2025-04-28 | End: 2025-04-28

## 2025-04-28 RX ORDER — HYDROCODONE POLISTIREX AND CHLORPHENIRAMINE POLISTIREX 10; 8 MG/5ML; MG/5ML
5 SUSPENSION, EXTENDED RELEASE ORAL EVERY 12 HOURS PRN
Refills: 0 | Status: DISCONTINUED | OUTPATIENT
Start: 2025-04-28 | End: 2025-04-29

## 2025-04-28 RX ORDER — PROPOFOL 10 MG/ML
INJECTION, EMULSION INTRAVENOUS AS NEEDED
Status: DISCONTINUED | OUTPATIENT
Start: 2025-04-28 | End: 2025-04-28

## 2025-04-28 RX ORDER — BENZONATATE 100 MG/1
100 CAPSULE ORAL 3 TIMES DAILY
Status: DISCONTINUED | OUTPATIENT
Start: 2025-04-28 | End: 2025-04-29

## 2025-04-28 RX ORDER — ATORVASTATIN CALCIUM 40 MG/1
40 TABLET, FILM COATED ORAL
Status: DISCONTINUED | OUTPATIENT
Start: 2025-04-28 | End: 2025-05-01 | Stop reason: HOSPADM

## 2025-04-28 RX ORDER — FENTANYL CITRATE 50 UG/ML
INJECTION, SOLUTION INTRAMUSCULAR; INTRAVENOUS AS NEEDED
Status: DISCONTINUED | OUTPATIENT
Start: 2025-04-28 | End: 2025-04-28

## 2025-04-28 RX ORDER — ROCURONIUM BROMIDE 10 MG/ML
INJECTION, SOLUTION INTRAVENOUS AS NEEDED
Status: DISCONTINUED | OUTPATIENT
Start: 2025-04-28 | End: 2025-04-28

## 2025-04-28 RX ORDER — ACETAMINOPHEN 10 MG/ML
1000 INJECTION, SOLUTION INTRAVENOUS EVERY 6 HOURS PRN
Status: DISCONTINUED | OUTPATIENT
Start: 2025-04-28 | End: 2025-04-29

## 2025-04-28 RX ORDER — LIDOCAINE HYDROCHLORIDE 10 MG/ML
INJECTION, SOLUTION EPIDURAL; INFILTRATION; INTRACAUDAL; PERINEURAL AS NEEDED
Status: DISCONTINUED | OUTPATIENT
Start: 2025-04-28 | End: 2025-04-28

## 2025-04-28 RX ORDER — LEVALBUTEROL INHALATION SOLUTION 1.25 MG/3ML
1.25 SOLUTION RESPIRATORY (INHALATION)
Status: DISCONTINUED | OUTPATIENT
Start: 2025-04-28 | End: 2025-04-30

## 2025-04-28 RX ORDER — MAGNESIUM SULFATE HEPTAHYDRATE 40 MG/ML
2 INJECTION, SOLUTION INTRAVENOUS ONCE
Status: COMPLETED | OUTPATIENT
Start: 2025-04-28 | End: 2025-04-28

## 2025-04-28 RX ORDER — LIDOCAINE HYDROCHLORIDE 10 MG/ML
5 INJECTION, SOLUTION EPIDURAL; INFILTRATION; INTRACAUDAL; PERINEURAL ONCE
Status: DISCONTINUED | OUTPATIENT
Start: 2025-04-28 | End: 2025-04-28

## 2025-04-28 RX ORDER — ENOXAPARIN SODIUM 100 MG/ML
40 INJECTION SUBCUTANEOUS DAILY
Status: DISCONTINUED | OUTPATIENT
Start: 2025-04-28 | End: 2025-05-01 | Stop reason: HOSPADM

## 2025-04-28 RX ORDER — BENZONATATE 100 MG/1
100 CAPSULE ORAL 3 TIMES DAILY PRN
Status: DISCONTINUED | OUTPATIENT
Start: 2025-04-28 | End: 2025-04-28

## 2025-04-28 RX ORDER — BENZONATATE 100 MG/1
100 CAPSULE ORAL 3 TIMES DAILY
Status: DISCONTINUED | OUTPATIENT
Start: 2025-04-28 | End: 2025-04-28

## 2025-04-28 RX ADMIN — ROCURONIUM BROMIDE 35 MG: 10 INJECTION, SOLUTION INTRAVENOUS at 12:16

## 2025-04-28 RX ADMIN — HYDROCODONE POLISTIREX AND CHLORPHENIRAMINE POLISTIREX 5 ML: 10; 8 SUSPENSION, EXTENDED RELEASE ORAL at 23:30

## 2025-04-28 RX ADMIN — LEVALBUTEROL HYDROCHLORIDE 1.25 MG: 1.25 SOLUTION RESPIRATORY (INHALATION) at 14:00

## 2025-04-28 RX ADMIN — FORMOTEROL FUMARATE DIHYDRATE 20 MCG: 20 SOLUTION RESPIRATORY (INHALATION) at 19:11

## 2025-04-28 RX ADMIN — FENTANYL CITRATE 100 MCG: 50 INJECTION INTRAMUSCULAR; INTRAVENOUS at 12:16

## 2025-04-28 RX ADMIN — SUGAMMADEX 200 MG: 100 INJECTION, SOLUTION INTRAVENOUS at 13:01

## 2025-04-28 RX ADMIN — PROPOFOL 200 MG: 10 INJECTION, EMULSION INTRAVENOUS at 12:16

## 2025-04-28 RX ADMIN — ALBUTEROL SULFATE 2.5 MG: 2.5 SOLUTION RESPIRATORY (INHALATION) at 12:55

## 2025-04-28 RX ADMIN — LIDOCAINE HYDROCHLORIDE 50 MG: 10 INJECTION, SOLUTION EPIDURAL; INFILTRATION; INTRACAUDAL; PERINEURAL at 12:16

## 2025-04-28 RX ADMIN — LIDOCAINE HYDROCHLORIDE 5 ML: 20 INJECTION, SOLUTION EPIDURAL; INFILTRATION; INTRACAUDAL at 14:15

## 2025-04-28 RX ADMIN — LEVALBUTEROL HYDROCHLORIDE 1.25 MG: 1.25 SOLUTION RESPIRATORY (INHALATION) at 19:07

## 2025-04-28 RX ADMIN — ONDANSETRON 4 MG: 2 INJECTION, SOLUTION INTRAMUSCULAR; INTRAVENOUS at 12:59

## 2025-04-28 RX ADMIN — IPRATROPIUM BROMIDE 0.5 MG: 0.5 SOLUTION RESPIRATORY (INHALATION) at 14:00

## 2025-04-28 RX ADMIN — BUDESONIDE 0.5 MG: 0.5 INHALANT RESPIRATORY (INHALATION) at 19:07

## 2025-04-28 RX ADMIN — ENOXAPARIN SODIUM 40 MG: 40 INJECTION SUBCUTANEOUS at 15:24

## 2025-04-28 RX ADMIN — DEXMEDETOMIDINE HYDROCHLORIDE 6 MCG: 100 INJECTION, SOLUTION INTRAVENOUS at 12:12

## 2025-04-28 RX ADMIN — Medication 200 MCG: at 12:19

## 2025-04-28 RX ADMIN — PROPOFOL 60 MCG/KG/MIN: 10 INJECTION, EMULSION INTRAVENOUS at 12:18

## 2025-04-28 RX ADMIN — BENZONATATE 100 MG: 100 CAPSULE ORAL at 20:03

## 2025-04-28 RX ADMIN — HYDROCODONE POLISTIREX AND CHLORPHENIRAMINE POLISTIREX 5 ML: 10; 8 SUSPENSION, EXTENDED RELEASE ORAL at 13:38

## 2025-04-28 RX ADMIN — BENZONATATE 100 MG: 100 CAPSULE ORAL at 13:46

## 2025-04-28 RX ADMIN — IPRATROPIUM BROMIDE 0.5 MG: 0.5 SOLUTION RESPIRATORY (INHALATION) at 19:07

## 2025-04-28 RX ADMIN — ROCURONIUM BROMIDE 10 MG: 10 INJECTION, SOLUTION INTRAVENOUS at 12:39

## 2025-04-28 RX ADMIN — MAGNESIUM SULFATE HEPTAHYDRATE 2 G: 40 INJECTION, SOLUTION INTRAVENOUS at 15:29

## 2025-04-28 RX ADMIN — SODIUM CHLORIDE, SODIUM LACTATE, POTASSIUM CHLORIDE, AND CALCIUM CHLORIDE 100 ML/HR: .6; .31; .03; .02 INJECTION, SOLUTION INTRAVENOUS at 11:37

## 2025-04-28 RX ADMIN — DEXMEDETOMIDINE HYDROCHLORIDE 6 MCG: 100 INJECTION, SOLUTION INTRAVENOUS at 12:17

## 2025-04-28 RX ADMIN — CHLORHEXIDINE GLUCONATE 15 ML: 1.2 SOLUTION ORAL at 20:03

## 2025-04-28 RX ADMIN — ALBUTEROL SULFATE 2.5 MG: 2.5 SOLUTION RESPIRATORY (INHALATION) at 10:30

## 2025-04-28 RX ADMIN — CHLORHEXIDINE GLUCONATE 15 ML: 1.2 SOLUTION ORAL at 15:24

## 2025-04-28 RX ADMIN — ACETAMINOPHEN 1000 MG: 10 INJECTION INTRAVENOUS at 15:29

## 2025-04-28 RX ADMIN — SUGAMMADEX 200 MG: 100 INJECTION, SOLUTION INTRAVENOUS at 13:04

## 2025-04-28 NOTE — ANESTHESIA POSTPROCEDURE EVALUATION
Post-Op Assessment Note    Last Filed PACU Vitals:  Vitals Value Taken Time   Temp 97.6 °F (36.4 °C) 04/28/25 1332   Pulse 118 04/28/25 1450   /63 04/28/25 1445   Resp 31 04/28/25 1450   SpO2 97 % 04/28/25 1450   Vitals shown include unfiled device data.

## 2025-04-28 NOTE — PLAN OF CARE
Problem: PAIN - ADULT  Goal: Verbalizes/displays adequate comfort level or baseline comfort level  Description: Interventions:- Encourage patient to monitor pain and request assistance- Assess pain using appropriate pain scale- Administer analgesics based on type and severity of pain and evaluate response- Implement non-pharmacological measures as appropriate and evaluate response- Consider cultural and social influences on pain and pain management- Notify physician/advanced practitioner if interventions unsuccessful or patient reports new pain  Outcome: Progressing     Problem: INFECTION - ADULT  Goal: Absence or prevention of progression during hospitalization  Description: INTERVENTIONS:- Assess and monitor for signs and symptoms of infection- Monitor lab/diagnostic results- Monitor all insertion sites, i.e. indwelling lines, tubes, and drains- Monitor endotracheal if appropriate and nasal secretions for changes in amount and color- [place] appropriate cooling/warming therapies per order- Administer medications as ordered- Instruct and encourage patient and family to use good hand hygiene technique- Identify and instruct in appropriate isolation precautions for identified infection/condition  Outcome: Progressing     Problem: SAFETY ADULT  Goal: Patient will remain free of falls  Description: INTERVENTIONS:- Educate patient/family on patient safety including physical limitations- Instruct patient to call for assistance with activity - Consult OT/PT to assist with strengthening/mobility - Keep Call bell within reach- Keep bed low and locked with side rails adjusted as appropriate- Keep care items and personal belongings within reach- Initiate and maintain comfort rounds- Make Fall Risk Sign visible to staff- Offer Toileting every 2 Hours, in advance of need- Initiate/Maintain bed alarm- Obtain necessary fall risk management equipment: - Apply yellow socks and bracelet for high fall risk patients- Consider  moving patient to room near nurses station  Outcome: Progressing  Goal: Maintain or return to baseline ADL function  Description: INTERVENTIONS:-  Assess patient's ability to carry out ADLs; assess patient's baseline for ADL function and identify physical deficits which impact ability to perform ADLs (bathing, care of mouth/teeth, toileting, grooming, dressing, etc.)- Assess/evaluate cause of self-care deficits - Assess range of motion- Assess patient's mobility; develop plan if impaired- Assess patient's need for assistive devices and provide as appropriate- Encourage maximum independence but intervene and supervise when necessary- Involve family in performance of ADLs- Assess for home care needs following discharge - Consider OT consult to assist with ADL evaluation and planning for discharge- Provide patient education as appropriate  Outcome: Progressing  Goal: Maintains/Returns to pre admission functional level  Description: INTERVENTIONS:- Perform AM-PAC 6 Click Basic Mobility/ Daily Activity assessment daily.- Set and communicate daily mobility goal to care team and patient/family/caregiver. - Collaborate with rehabilitation services on mobility goals if consulted- Perform Range of Motion 3 times a day.- Reposition patient every 2 hours.- Dangle patient 3 times a day- Stand patient 3 times a day- Ambulate patient 3 times a day- Out of bed to chair 3 times a day - Out of bed for meals 3 times a day- Out of bed for toileting- Record patient progress and toleration of activity level   Outcome: Progressing     Problem: DISCHARGE PLANNING  Goal: Discharge to home or other facility with appropriate resources  Description: INTERVENTIONS:- Identify barriers to discharge w/patient and caregiver- Arrange for needed discharge resources and transportation as appropriate- Identify discharge learning needs (meds, wound care, etc.)- Arrange for interpretive services to assist at discharge as needed- Refer to Case Management  Department for coordinating discharge planning if the patient needs post-hospital services based on physician/advanced practitioner order or complex needs related to functional status, cognitive ability, or social support system  Outcome: Progressing     Problem: Knowledge Deficit  Goal: Patient/family/caregiver demonstrates understanding of disease process, treatment plan, medications, and discharge instructions  Description: Complete learning assessment and assess knowledge base.Interventions:- Provide teaching at level of understanding- Provide teaching via preferred learning methods  Outcome: Progressing

## 2025-04-28 NOTE — PROGRESS NOTES
Progress Note - Critical Care/ICU   Name: Susanna Guevara 74 y.o. female I MRN: [id number]  Unit/Bed#: MICU 11 I Date of Admission: 2025   Date of Service: 2025 I Hospital Day: 0       Critical Care Attending Note    74 year old woman with Severe COPD (FEV1 28-43%) [place] [facility] [place] failure (3L/min NC with 6L/min exertion), CAD, DLP, DM2 presented for BLVR.  She underwent 2 Wilmer valves placed into SHIN/Lingula    Noted severe coughing after bronchoscopy, now improving post lidocaine neb, no hemoptysis, no pleurisy    VS AF, -120's, MAPS 70-90's, SpO2 97% 6LNC  Exam  GEN older woman in bed, conversant now less coughing  HEENT MMM, no thrush, no oral lesions  NECK no accessory muscle use JVD not elevated  CV reg, tachy, single s1/2, no m/r  Pulm mild scattered wheeze - improving post procedure, improving cough, no rales, no rhonchi  ABD +BS soft NTND, no rebound  EXT warm, brisk cap refill, no edema    Laboratory and Diagnostics                                                  AB2025 - 7.42/36/66    MICRO  SHIN [place]  - pending    RADIOGRAPHS - New images and reports personally reviewed  CXR  - in OR - no PTX appreciated    RHC 3/2025  RA 9  RV 30/9  PA 30/16, 21  PCWP 12  TPG 9  DPG 4  PA sat 72.6%  Yi CO/CI: 4.6/2.8  PVR 2.0    TTE 2025 - EF 60%, mod dilated RV, RVSP 26mmHg    L[place]ers/DLCO 3/20/2025 - Ratio 64%, FVC 35%, FEV1 28%, DLCO 49%    PFTs 2025 - Ratio 48%, FVC 60%, FEV1 36%, post BD FEV1 43% - 20% change, %, %, unable to perform DLCO     Assessment  Severe COPD post BLVR SHIN x 2 valves  Acute/chronic hypoxic respiratory failure  Chronic mild RV dilation with mean PAP 21mmHG  DM2  Post bronchoscopy cough  Reported allergy prednisone/methylprednisone  Nicotine dependence in remission    PLAN  Admit to ICU - anticipate >2 midnight stay  Standard post BLVR CXR images  PTX cart at bedside   Cough suppression regimen - was given  lidocaine neb x 1, will not continue in effort to advance diet  Continue xopenex/atrovent nebs q6hrs, pulmicort/perforomist nebs BID  Hold on systemic steroids at this time but will reassess  Follow up culture results  Bedrest today, start PT/OT tomorrow  Titrate O2 to keep SpO2 90-94%  ADAT once topical analgesia is resolved, bowel regimen  ISS for now  SCDs/LMWH  Her  was updated at bedside and all questions answered     I have personally provided 0 minutes of critical care time, exclusive of procedures, teaching, and any prior time recorded by providers other than myself. Time includes review of laboratory data, radiology results, discussion with consultants, and monitoring for potential decompensation.    Alfa Brown, DO

## 2025-04-28 NOTE — PLAN OF CARE
Problem: PAIN - ADULT  Goal: Verbalizes/displays adequate comfort level or baseline comfort level  Description: Interventions:- Encourage patient to monitor pain and request assistance- Assess pain using appropriate pain scale- Administer analgesics based on type and severity of pain and evaluate response- Implement non-pharmacological measures as appropriate and evaluate response- Consider cultural and social influences on pain and pain management- Notify physician/advanced practitioner if interventions unsuccessful or patient reports new pain  Outcome: Progressing     Problem: INFECTION - ADULT  Goal: Absence or prevention of progression during hospitalization  Description: INTERVENTIONS:- Assess and monitor for signs and symptoms of infection- Monitor lab/diagnostic results- Monitor all insertion sites, i.e. indwelling lines, tubes, and drains- Monitor endotracheal if appropriate and nasal secretions for changes in amount and color- [place] appropriate cooling/warming therapies per order- Administer medications as ordered- Instruct and encourage patient and family to use good hand hygiene technique- Identify and instruct in appropriate isolation precautions for identified infection/condition  Outcome: Progressing     Problem: DISCHARGE PLANNING  Goal: Discharge to home or other facility with appropriate resources  Description: INTERVENTIONS:- Identify barriers to discharge w/patient and caregiver- Arrange for needed discharge resources and transportation as appropriate- Identify discharge learning needs (meds, wound care, etc.)- Arrange for interpretive services to assist at discharge as needed- Refer to Case Management Department for coordinating discharge planning if the patient needs post-hospital services based on physician/advanced practitioner order or complex needs related to functional status, cognitive ability, or social support system  Outcome: Progressing     Problem: Knowledge Deficit  Goal:  Patient/family/caregiver demonstrates understanding of disease process, treatment plan, medications, and discharge instructions  Description: Complete learning assessment and assess knowledge base.Interventions:- Provide teaching at level of understanding- Provide teaching via preferred learning methods  Outcome: Progressing

## 2025-04-28 NOTE — H&P
H&P - Pulmonology   Name: Susanna Guevara 74 y.o. female I MRN: [id number]  Unit/Bed#:  I Date of Admission: 2025   Date of Service: 2025 I Hospital Day: 0     Assessment & Plan  COPD, severe (HCC)  Patient has severe COPD and remains symptomatic despite optimal medical therapy.  She has completed workup for bronchoscopic lung volume reduction with target being the left upper lobe.  Patient understand risk which would include COPD exacerbation, pneumonia, hypoxia and pneumothorax occurring in up to 30% of patients.  If she develops a pneumothorax, she would likely need a chest tube.  She verbalizes understanding and consent has been signed.    History of Present Illness   Susanna Guevara is a 74 y.o. female who presents for bronchoscopic lung volume reduction.  She has completed the workup and appears to be a candidate for treatment targeting the left upper lobe.  No significant changes since her last visit with me last month.  Exertional dyspnea stable.  No cough, wheeze or sputum production    Review of Systems  Historical Information   Past Medical History:   Diagnosis Date    COPD (chronic obstructive pulmonary disease) (HCC)     Diabetes mellitus (HCC)     Hyperlipidemia     Pulmonary nodule 2017     Past Surgical History:   Procedure Laterality Date    CARDIAC CATHETERIZATION N/A 3/13/2025    Procedure: Cardiac RHC;  Surgeon: Ramsey Turpin MD;  [facility]: [facility];  Service: Cardiology    HAND SURGERY      TENDON TRANSFER      Left lower arm     Social History     Tobacco Use    Smoking status: Former     Current packs/day: 0.00     Average packs/day: 1 pack/day for 47.0 years (47.0 ttl pk-yrs)     Types: Cigarettes     Start date:      Quit date: 2017     Years since quittin.3    Smokeless tobacco: Never   Vaping Use    Vaping status: Never Used   Substance and Sexual Activity    Alcohol use: No    Drug use: No    Sexual activity: Not on file     E-Cigarette/Vaping     E-Cigarette Use Never User      E-Cigarette/Vaping Substances    Nicotine No     THC No     CBD No     Flavoring No     Other No     Unknown No      Family history non-contributory    Objective :  Temp:  [97.3 °F (36.3 °C)] 97.3 °F (36.3 °C)  HR:  [108] 108  BP: (129)/(82) 129/82  SpO2:  [97 %] 97 %  O2 Device: Nasal cannula  Nasal Cannula O2 Flow Rate (L/min):  [3 L/min] 3 L/min    Physical Exam  Vitals reviewed.   Constitutional:       General: She is not in acute distress.     Appearance: She is well-developed.   Eyes:      General: No scleral icterus.  Neck:      Vascular: No JVD.   Cardiovascular:      Rate and Rhythm: Normal rate and regular rhythm.   Pulmonary:      Breath sounds: No wheezing, rhonchi or rales.   Abdominal:      Tenderness: There is no abdominal tenderness.   Musculoskeletal:         General: No swelling.   Skin:     General: Skin is warm and dry.   Neurological:      Mental Status: She is alert and oriented to person, place, and time.   Psychiatric:         Mood and Affect: Mood normal.         Lab Results: I have reviewed the following results:      Chest CT - date 12/17/2024  Moderate emphysema     Pulmonary function testing:  Performed 1/31/2025  FEV1/FVC ratio 47%    FEV1 36% predicted  FVC 60% predicted  (+) response to bronchodilators  Post BD FEV1 43% predicted   % predicted   % predicted  DLCO corrected for hemoglobin unable to perform maneuver     ABG -1/30/2025- pH 7.415/ pCO2 36.3/ pO2 66 on room air     6WMT   Date 1/31/2025  Distance walked 234 m, 6 L     Pulmonary Rehabilitation - completed 8 years ago     Other Studies:      2D echocardiogram -2/19/2025  EF 60%, right ventricle dilated, RVSP 26 mmHg     SPECT-CT  RIGHT LUNG:     RIGHT LUNG COUNTS (%):  Right upper lobe:    30  Right middle lobe:     9  Right lower lobe:    31  Total:                69     RIGHT LUNG VOLUMES (%):  Right upper lobe:    22  Right middle lobe:     11  Right lower lobe:    24  Total:                 57        LEFT LUNG:     LEFT LUNG COUNTS (%):  Left upper lobe:    17  Left lower lobe:    14  Total:                31     LEFT LUNG VOLUMES (%):  Left upper lobe:    21  Left lower lobe:    22  Total:                43

## 2025-04-28 NOTE — ANESTHESIA PREPROCEDURE EVALUATION
Procedure:  BRONCHOSCOPY W/ BRONCHOSCOPIC LUNG VOLUME REDUCTION    Relevant Problems   CARDIO   (+) HLD (hyperlipidemia)      ENDO   (+) Type 2 diabetes mellitus without complication, without long-term current use of insulin (HCC)      PULMONARY   (+) COPD, severe (HCC)   (+) Chronic hypoxic respiratory failure (HCC)   (+) Dyspnea      Behavioral Health   (+) Tobacco abuse, in remission      Respiratory/Allergy   (+) Lung nodule      Other   (+) Chronic tachycardia       CMP: Omo=813    CBC: Hb=10.8    2/19/25 TTE    Left Ventricle: Left ventricular cavity size is normal. Wall thickness is normal. The left ventricular ejection fraction is 60% by visual estimation. Although no diagnostic regional wall motion abnormality was identified, this possibility cannot be completely excluded on the basis of this study. Diastolic function is normal for age.  •  Right Ventricle: Right ventricular cavity size is moderately dilated.  •  Right Atrium: The atrium is dilated.  •  Aortic Valve: There is mild regurgitation.  •  Prior TTE study available for comparison. No significant changes noted compared to the prior study.    RHC: WNL        Physical Exam    Airway    Mallampati score: II  TM Distance: >3 FB  Neck ROM: full     Dental       Cardiovascular      Pulmonary      Other Findings  post-pubertal.      Anesthesia Plan  ASA Score- 3     Anesthesia Type- general with ASA Monitors.         Additional Monitors:     Airway Plan: ETT.           Plan Factors-    Chart reviewed.  Imaging results reviewed. Existing labs reviewed. Patient summary reviewed.    Patient is not a current smoker.  Patient did not smoke on day of surgery.            Induction- intravenous.    Postoperative Plan- . Planned trial extubation    Perioperative Resuscitation Plan - Level 1 - Full Code.       Informed Consent- Anesthetic plan and risks discussed with patient.  I personally reviewed this patient with the CRNA. Discussed and agreed on the Anesthesia  Plan with the CRNA..      NPO Status:  Vitals Value Taken Time   Date of last liquid 04/28/25 04/28/25 1023   Time of last liquid 0800 04/28/25 1023   Date of last solid 04/27/25 04/28/25 1023   Time of last solid 2100 04/28/25 1023

## 2025-04-28 NOTE — ANESTHESIA POSTPROCEDURE EVALUATION
Post-Op Assessment Note    CV Status:  Stable  Pain Score: 0    Pain management: adequate       Mental Status:  Awake   Hydration Status:  Stable   PONV Controlled:  None   Airway Patency:  Patent  Airway: intubated     Post Op Vitals Reviewed: Yes    No anethesia notable event occurred.    Staff: CRNA           Last Filed PACU Vitals:  Vitals Value Taken Time   Temp 36    Pulse 112 04/28/25 1323   /86    Resp 24    SpO2 96 % 04/28/25 1323   Vitals shown include unfiled device data.

## 2025-04-28 NOTE — ASSESSMENT & PLAN NOTE
Patient has severe COPD and remains symptomatic despite optimal medical therapy.  She has completed workup for bronchoscopic lung volume reduction with target being the left upper lobe.  Patient understand risk which would include COPD exacerbation, pneumonia, hypoxia and pneumothorax occurring in up to 30% of patients.  If she develops a pneumothorax, she would likely need a chest tube.  She verbalizes understanding and consent has been signed.

## 2025-04-28 NOTE — PROGRESS NOTES
Progress Note - Critical Care/ICU   Name: Susanna Guevara 74 y.o. female I MRN: [id number]  Unit/Bed#: MICU 11 I Date of Admission: 4/28/2025   Date of Service: 4/28/2025 I Hospital Day: 0       Assessment & Plan   Active Hospital Problems    Diagnosis Date Noted POA    COPD, severe (HCC) 08/28/2017 Yes     Chronic    Chronic hypoxic respiratory failure (HCC) 08/22/2024 Yes    Lung nodule 08/31/2017 Yes    Tobacco abuse, in remission 05/24/2019 Yes    HLD (hyperlipidemia) 08/28/2017 Yes    Type 2 diabetes mellitus without complication, without long-term current use of insulin (HCC) 08/28/2017 Yes      Resolved Hospital Problems   No resolved problems to display.       Neuro:   No active issues     CV:   HLD  Continue home lipitor w diet     Pulm:  Diagnosis: Severe COPD, Chronic hypoxic respiratory failure, POD 1 Bronchoscopic Lung Valve Reduction, previous smoker w 47 pack years (quit 8 years ago)  CHRF:  Likely due to COPD  Requires 3 - 6L at home     Severe COPD POD 1 BLVR   Continued to be dyspneic despite maximal medical therapy with ICS/LABA/LAMA  Home regimen: Trelegy Ellipta, Xopenex nebulized solution, Albuterol rescue inhaler   CXR 1 hr, 4 hr, 8 hr, 24 hr postoperatively, POD 2, POD 3   Antitussives: Tessalon perles TID, Tussionex q12  Nebulized Treatments: Xopenex/Atrovent, inhaled lidocaine, Perforomist, Pulmicort   GI:   Monitor BM postoperatively  Start bowel regimen     :   Cr at baseline, Ur output adequate     F/E/N:   F euvolemic  E replete prn  N tolerating regular diet     Heme/Onc:   Lovenox for DVT ppx     Endo:   Diabeters, last A1c was 7.1  On Metformin 500 at home   SSI    ID:   No active issues  Follow bronchial culture     MSK/Skin:   None  Disposition: Critical care    ICU Core Measures     A: Assess, Prevent, and Manage Pain Has pain been assessed? Yes  Need for changes to pain regimen? No   B: Both SAT/SAT  N/A   C: Choice of Sedation RASS Goal: 0 Alert and Calm  Need for changes to  sedation or analgesia regimen? No   D: Delirium CAM-ICU: Negative   E: Early Mobility  Plan for early mobility? Yes   F: Family Engagement Plan for family engagement today? Yes         Prophylaxis:  VTE VTE covered by:  enoxaparin, Subcutaneous       Stress Ulcer  not ordered         24 Hour Events :     Overnight patient had coughing spell, received Tussionex, POCUS was done and there was no air observed.    Subjective       Objective :                   Vitals I/O      Most Recent Min/Max in 24hrs   Temp 97.6 °F (36.4 °C) Temp  Min: 97.3 °F (36.3 °C)  Max: 97.6 °F (36.4 °C)   Pulse (!) 122 Pulse  Min: 108  Max: 122   Resp (!) 39 Resp  Min: 18  Max: 42   /73 BP  Min: 121/77  Max: 156/97   O2 Sat 91 % SpO2  Min: 85 %  Max: 97 %      Intake/Output Summary (Last 24 hours) at 4/28/2025 1351  Last data filed at 4/28/2025 1304  Gross per 24 hour   Intake 750 ml   Output 0 ml   Net 750 ml       Diet Regular; Regular House    Invasive Monitoring           Physical Exam   Physical Exam  Cardiovascular:      Rate and Rhythm: Regular rhythm. Tachycardia present.      Heart sounds: Normal heart sounds.   Musculoskeletal:      Right lower leg: No edema.      Left lower leg: No edema.   Abdominal:      Palpations: Abdomen is soft.      Tenderness: There is no abdominal tenderness. There is no guarding.   Pulmonary:      Effort: Tachypnea and respiratory distress present.      Breath sounds: No wheezing or rales.          Diagnostic Studies        Lab Results: I have reviewed the following results:     Medications:  Scheduled PRN   atorvastatin, 40 mg, Daily With Dinner  benzonatate, 100 mg, TID  budesonide, 0.5 mg, Q12H  chlorhexidine, 15 mL, Q12H MISTY  enoxaparin, 40 mg, Daily  formoterol, 20 mcg, Q12H  ipratropium, 0.5 mg, Q6H  levalbuterol, 1.25 mg, Q6H      acetaminophen, 650 mg, Q6H PRN  Hydrocod Octavio-Chlorphe Octavio ER, 5 mL, Q12H PRN       Continuous          Labs:   CBC    No recent results  BMP    No recent results     Coags    No recent results     Additional Electrolytes  No recent results       Blood Gas    No recent results  No recent results LFTs  No recent results    Infectious  No recent results  Glucose  No recent results

## 2025-04-29 ENCOUNTER — APPOINTMENT (INPATIENT)
Dept: RADIOLOGY | Facility: HOSPITAL | Age: 75
DRG: 163 | End: 2025-04-29
Payer: MEDICARE

## 2025-04-29 LAB
ALBUMIN SERPL BCG-MCNC: 4 G/DL (ref 3.5–5)
ALP SERPL-CCNC: 77 U/L (ref 34–104)
ALT SERPL W P-5'-P-CCNC: 15 U/L (ref 7–52)
ANION GAP SERPL CALCULATED.3IONS-SCNC: 8 MMOL/L (ref 4–13)
AST SERPL W P-5'-P-CCNC: 18 U/L (ref 13–39)
BILIRUB SERPL-MCNC: 0.47 MG/DL (ref 0.2–1)
BUN SERPL-MCNC: 11 MG/DL (ref 5–25)
CALCIUM SERPL-MCNC: 8.7 MG/DL (ref 8.4–10.2)
CHLORIDE SERPL-SCNC: 100 MMOL/L (ref 96–108)
CO2 SERPL-SCNC: 29 MMOL/L (ref 21–32)
CREAT SERPL-MCNC: 0.71 MG/DL (ref 0.6–1.3)
ERYTHROCYTE [DISTWIDTH] IN BLOOD BY AUTOMATED COUNT: 12.7 % (ref 11.6–15.1)
EST. AVERAGE GLUCOSE BLD GHB EST-MCNC: 154 MG/DL
GFR SERPL CREATININE-BSD FRML MDRD: 83 ML/MIN/1.73SQ M
GLUCOSE SERPL-MCNC: 147 MG/DL (ref 65–140)
GLUCOSE SERPL-MCNC: 147 MG/DL (ref 65–140)
GLUCOSE SERPL-MCNC: 167 MG/DL (ref 65–140)
GLUCOSE SERPL-MCNC: 167 MG/DL (ref 65–140)
HBA1C MFR BLD: 7 %
HCT VFR BLD AUTO: 37.6 % (ref 34.8–46.1)
HGB BLD-MCNC: 12.3 G/DL (ref 11.5–15.4)
MAGNESIUM SERPL-MCNC: 2 MG/DL (ref 1.9–2.7)
MCH RBC QN AUTO: 31.8 PG (ref 26.8–34.3)
MCHC RBC AUTO-ENTMCNC: 32.7 G/DL (ref 31.4–37.4)
MCV RBC AUTO: 97 FL (ref 82–98)
PHOSPHATE SERPL-MCNC: 3.4 MG/DL (ref 2.3–4.1)
PLATELET # BLD AUTO: 247 THOUSANDS/UL (ref 149–390)
PMV BLD AUTO: 9.1 FL (ref 8.9–12.7)
POTASSIUM SERPL-SCNC: 4.1 MMOL/L (ref 3.5–5.3)
PROT SERPL-MCNC: 6.6 G/DL (ref 6.4–8.4)
RBC # BLD AUTO: 3.87 MILLION/UL (ref 3.81–5.12)
RHODAMINE-AURAMINE STN SPEC: NORMAL
SODIUM SERPL-SCNC: 137 MMOL/L (ref 135–147)
WBC # BLD AUTO: 11.6 THOUSAND/UL (ref 4.31–10.16)

## 2025-04-29 PROCEDURE — 97163 PT EVAL HIGH COMPLEX 45 MIN: CPT

## 2025-04-29 PROCEDURE — 97166 OT EVAL MOD COMPLEX 45 MIN: CPT

## 2025-04-29 PROCEDURE — 83735 ASSAY OF MAGNESIUM: CPT

## 2025-04-29 PROCEDURE — 94760 N-INVAS EAR/PLS OXIMETRY 1: CPT

## 2025-04-29 PROCEDURE — 84100 ASSAY OF PHOSPHORUS: CPT

## 2025-04-29 PROCEDURE — 71045 X-RAY EXAM CHEST 1 VIEW: CPT

## 2025-04-29 PROCEDURE — 76604 US EXAM CHEST: CPT | Performed by: ANESTHESIOLOGY

## 2025-04-29 PROCEDURE — 83036 HEMOGLOBIN GLYCOSYLATED A1C: CPT

## 2025-04-29 PROCEDURE — 94640 AIRWAY INHALATION TREATMENT: CPT

## 2025-04-29 PROCEDURE — 85027 COMPLETE CBC AUTOMATED: CPT

## 2025-04-29 PROCEDURE — NC001 PR NO CHARGE: Performed by: INTERNAL MEDICINE

## 2025-04-29 PROCEDURE — 82948 REAGENT STRIP/BLOOD GLUCOSE: CPT

## 2025-04-29 PROCEDURE — 94664 DEMO&/EVAL PT USE INHALER: CPT

## 2025-04-29 PROCEDURE — 80053 COMPREHEN METABOLIC PANEL: CPT

## 2025-04-29 PROCEDURE — 99232 SBSQ HOSP IP/OBS MODERATE 35: CPT | Performed by: INTERNAL MEDICINE

## 2025-04-29 RX ORDER — ACETAMINOPHEN 10 MG/ML
1000 INJECTION, SOLUTION INTRAVENOUS EVERY 8 HOURS PRN
Status: DISPENSED | OUTPATIENT
Start: 2025-04-29 | End: 2025-04-30

## 2025-04-29 RX ORDER — HYDROMORPHONE HCL IN WATER/PF 6 MG/30 ML
0.2 PATIENT CONTROLLED ANALGESIA SYRINGE INTRAVENOUS EVERY 4 HOURS PRN
Status: DISCONTINUED | OUTPATIENT
Start: 2025-04-29 | End: 2025-04-30

## 2025-04-29 RX ORDER — HYDROCODONE POLISTIREX AND CHLORPHENIRAMINE POLISTIREX 10; 8 MG/5ML; MG/5ML
5 SUSPENSION, EXTENDED RELEASE ORAL EVERY 12 HOURS
Refills: 0 | Status: DISCONTINUED | OUTPATIENT
Start: 2025-04-29 | End: 2025-05-01 | Stop reason: HOSPADM

## 2025-04-29 RX ORDER — HYDROMORPHONE HCL IN WATER/PF 6 MG/30 ML
0.2 PATIENT CONTROLLED ANALGESIA SYRINGE INTRAVENOUS ONCE
Status: COMPLETED | OUTPATIENT
Start: 2025-04-29 | End: 2025-04-29

## 2025-04-29 RX ORDER — HYDROMORPHONE HCL/PF 1 MG/ML
0.5 SYRINGE (ML) INJECTION ONCE
Status: COMPLETED | OUTPATIENT
Start: 2025-04-29 | End: 2025-04-29

## 2025-04-29 RX ORDER — BENZONATATE 100 MG/1
100 CAPSULE ORAL 3 TIMES DAILY
Status: DISCONTINUED | OUTPATIENT
Start: 2025-04-29 | End: 2025-05-01 | Stop reason: HOSPADM

## 2025-04-29 RX ORDER — ALBUTEROL SULFATE 90 UG/1
2 INHALANT RESPIRATORY (INHALATION) EVERY 6 HOURS PRN
Status: DISCONTINUED | OUTPATIENT
Start: 2025-04-29 | End: 2025-05-01 | Stop reason: HOSPADM

## 2025-04-29 RX ORDER — INSULIN LISPRO 100 [IU]/ML
1-5 INJECTION, SOLUTION INTRAVENOUS; SUBCUTANEOUS
Status: DISCONTINUED | OUTPATIENT
Start: 2025-04-29 | End: 2025-05-01 | Stop reason: HOSPADM

## 2025-04-29 RX ORDER — HYDROMORPHONE HCL IN WATER/PF 6 MG/30 ML
0.2 PATIENT CONTROLLED ANALGESIA SYRINGE INTRAVENOUS EVERY 4 HOURS PRN
Status: DISCONTINUED | OUTPATIENT
Start: 2025-04-29 | End: 2025-04-29

## 2025-04-29 RX ADMIN — LEVALBUTEROL HYDROCHLORIDE 1.25 MG: 1.25 SOLUTION RESPIRATORY (INHALATION) at 07:24

## 2025-04-29 RX ADMIN — IPRATROPIUM BROMIDE 0.5 MG: 0.5 SOLUTION RESPIRATORY (INHALATION) at 07:24

## 2025-04-29 RX ADMIN — BENZONATATE 100 MG: 100 CAPSULE ORAL at 08:12

## 2025-04-29 RX ADMIN — LEVALBUTEROL HYDROCHLORIDE 1.25 MG: 1.25 SOLUTION RESPIRATORY (INHALATION) at 14:27

## 2025-04-29 RX ADMIN — HYDROCODONE POLISTIREX AND CHLORPHENIRAMINE POLISTIREX 5 ML: 10; 8 SUSPENSION, EXTENDED RELEASE ORAL at 21:03

## 2025-04-29 RX ADMIN — LEVALBUTEROL HYDROCHLORIDE 1.25 MG: 1.25 SOLUTION RESPIRATORY (INHALATION) at 02:35

## 2025-04-29 RX ADMIN — BENZONATATE 100 MG: 100 CAPSULE ORAL at 21:03

## 2025-04-29 RX ADMIN — BENZONATATE 100 MG: 100 CAPSULE ORAL at 15:00

## 2025-04-29 RX ADMIN — IPRATROPIUM BROMIDE 0.5 MG: 0.5 SOLUTION RESPIRATORY (INHALATION) at 19:02

## 2025-04-29 RX ADMIN — CHLORHEXIDINE GLUCONATE 15 ML: 1.2 SOLUTION ORAL at 08:12

## 2025-04-29 RX ADMIN — FORMOTEROL FUMARATE DIHYDRATE 20 MCG: 20 SOLUTION RESPIRATORY (INHALATION) at 08:38

## 2025-04-29 RX ADMIN — LEVALBUTEROL HYDROCHLORIDE 1.25 MG: 1.25 SOLUTION RESPIRATORY (INHALATION) at 19:02

## 2025-04-29 RX ADMIN — HYDROMORPHONE HYDROCHLORIDE 0.2 MG: 0.2 INJECTION, SOLUTION INTRAMUSCULAR; INTRAVENOUS; SUBCUTANEOUS at 23:47

## 2025-04-29 RX ADMIN — INSULIN LISPRO 1 UNITS: 100 INJECTION, SOLUTION INTRAVENOUS; SUBCUTANEOUS at 08:31

## 2025-04-29 RX ADMIN — IPRATROPIUM BROMIDE 0.5 MG: 0.5 SOLUTION RESPIRATORY (INHALATION) at 02:35

## 2025-04-29 RX ADMIN — ATORVASTATIN CALCIUM 40 MG: 40 TABLET, FILM COATED ORAL at 16:18

## 2025-04-29 RX ADMIN — HYDROMORPHONE HYDROCHLORIDE 0.5 MG: 0.5 INJECTION, SOLUTION INTRAMUSCULAR; INTRAVENOUS; SUBCUTANEOUS at 03:06

## 2025-04-29 RX ADMIN — ENOXAPARIN SODIUM 40 MG: 40 INJECTION SUBCUTANEOUS at 08:12

## 2025-04-29 RX ADMIN — FORMOTEROL FUMARATE DIHYDRATE 20 MCG: 20 SOLUTION RESPIRATORY (INHALATION) at 19:02

## 2025-04-29 RX ADMIN — HYDROMORPHONE HYDROCHLORIDE 0.2 MG: 0.2 INJECTION, SOLUTION INTRAMUSCULAR; INTRAVENOUS; SUBCUTANEOUS at 07:54

## 2025-04-29 RX ADMIN — CHLORHEXIDINE GLUCONATE 15 ML: 1.2 SOLUTION ORAL at 21:03

## 2025-04-29 RX ADMIN — HYDROCODONE POLISTIREX AND CHLORPHENIRAMINE POLISTIREX 5 ML: 10; 8 SUSPENSION, EXTENDED RELEASE ORAL at 09:46

## 2025-04-29 RX ADMIN — ACETAMINOPHEN 650 MG: 325 TABLET, FILM COATED ORAL at 14:12

## 2025-04-29 RX ADMIN — BENZONATATE 100 MG: 100 CAPSULE ORAL at 02:50

## 2025-04-29 RX ADMIN — IPRATROPIUM BROMIDE 0.5 MG: 0.5 SOLUTION RESPIRATORY (INHALATION) at 14:27

## 2025-04-29 RX ADMIN — BUDESONIDE 0.5 MG: 0.5 INHALANT RESPIRATORY (INHALATION) at 07:24

## 2025-04-29 RX ADMIN — BUDESONIDE 0.5 MG: 0.5 INHALANT RESPIRATORY (INHALATION) at 19:02

## 2025-04-29 RX ADMIN — ALBUTEROL SULFATE 2 PUFF: 90 AEROSOL, METERED RESPIRATORY (INHALATION) at 21:41

## 2025-04-29 NOTE — PLAN OF CARE
Problem: PAIN - ADULT  Goal: Verbalizes/displays adequate comfort level or baseline comfort level  Description: Interventions:- Encourage patient to monitor pain and request assistance- Assess pain using appropriate pain scale- Administer analgesics based on type and severity of pain and evaluate response- Implement non-pharmacological measures as appropriate and evaluate response- Consider cultural and social influences on pain and pain management- Notify physician/advanced practitioner if interventions unsuccessful or patient reports new pain  Outcome: Progressing     Problem: INFECTION - ADULT  Goal: Absence or prevention of progression during hospitalization  Description: INTERVENTIONS:- Assess and monitor for signs and symptoms of infection- Monitor lab/diagnostic results- Monitor all insertion sites, i.e. indwelling lines, tubes, and drains- Monitor endotracheal if appropriate and nasal secretions for changes in amount and color- [place] appropriate cooling/warming therapies per order- Administer medications as ordered- Instruct and encourage patient and family to use good hand hygiene technique- Identify and instruct in appropriate isolation precautions for identified infection/condition  Outcome: Progressing     Problem: SAFETY ADULT  Goal: Patient will remain free of falls  Description: INTERVENTIONS:- Educate patient/family on patient safety including physical limitations- Instruct patient to call for assistance with activity - Consult OT/PT to assist with strengthening/mobility - Keep Call bell within reach- Keep bed low and locked with side rails adjusted as appropriate- Keep care items and personal belongings within reach- Initiate and maintain comfort rounds- Make Fall Risk Sign visible to staff- Offer Toileting every 2 Hours, in advance of need- Initiate/Maintain bed alarm- Obtain necessary fall risk management equipment: - Apply yellow socks and bracelet for high fall risk patients- Consider  Patient will be discharging home. Follow up appointments have been scheduled and new prescriptions will be delivered to patient's room prior to discharge. Patient is being supplied with 2 weeks worth of Eliquis and 1 months worth of Tikosyn.   moving patient to room near nurses station  Outcome: Progressing  Goal: Maintain or return to baseline ADL function  Description: INTERVENTIONS:-  Assess patient's ability to carry out ADLs; assess patient's baseline for ADL function and identify physical deficits which impact ability to perform ADLs (bathing, care of mouth/teeth, toileting, grooming, dressing, etc.)- Assess/evaluate cause of self-care deficits - Assess range of motion- Assess patient's mobility; develop plan if impaired- Assess patient's need for assistive devices and provide as appropriate- Encourage maximum independence but intervene and supervise when necessary- Involve family in performance of ADLs- Assess for home care needs following discharge - Consider OT consult to assist with ADL evaluation and planning for discharge- Provide patient education as appropriate  Outcome: Progressing  Goal: Maintains/Returns to pre admission functional level  Description: INTERVENTIONS:- Perform AM-PAC 6 Click Basic Mobility/ Daily Activity assessment daily.- Set and communicate daily mobility goal to care team and patient/family/caregiver. - Collaborate with rehabilitation services on mobility goals if consulted- Perform Range of Motion 3 times a day.- Reposition patient every 2 hours.- Dangle patient 3 times a day- Stand patient 3 times a day- Ambulate patient 3 times a day- Out of bed to chair 3 times a day - Out of bed for meals 3 times a day- Out of bed for toileting- Record patient progress and toleration of activity level   Outcome: Progressing     Problem: DISCHARGE PLANNING  Goal: Discharge to home or other facility with appropriate resources  Description: INTERVENTIONS:- Identify barriers to discharge w/patient and caregiver- Arrange for needed discharge resources and transportation as appropriate- Identify discharge learning needs (meds, wound care, etc.)- Arrange for interpretive services to assist at discharge as needed- Refer to Case Management  Department for coordinating discharge planning if the patient needs post-hospital services based on physician/advanced practitioner order or complex needs related to functional status, cognitive ability, or social support system  Outcome: Progressing     Problem: Knowledge Deficit  Goal: Patient/family/caregiver demonstrates understanding of disease process, treatment plan, medications, and discharge instructions  Description: Complete learning assessment and assess knowledge base.Interventions:- Provide teaching at level of understanding- Provide teaching via preferred learning methods  Outcome: Progressing

## 2025-04-29 NOTE — PHYSICAL THERAPY NOTE
Physical Therapy Evaluation     Patient's Name: Susanna Guevara    Admitting Diagnosis  COPD, severe (HCC) [J44.9]    Problem List  Patient Active Problem List   Diagnosis    Dyspnea    COPD, severe (HCC)    Type 2 diabetes mellitus without complication, without long-term current use of insulin (HCC)    HLD (hyperlipidemia)    Lung nodule    Musculoskeletal neck pain    Need for pneumococcal vaccination    Tobacco abuse, in remission    Chronic tachycardia    BMI 28.0-28.9,adult    Chronic hypoxic respiratory failure (HCC)       Past Medical History  Past Medical History:   Diagnosis Date    COPD (chronic obstructive pulmonary disease) (HCC)     Diabetes mellitus (HCC)     Hyperlipidemia     Pulmonary nodule 8/31/2017       Past Surgical History  Past Surgical History:   Procedure Laterality Date    CARDIAC CATHETERIZATION N/A 3/13/2025    Procedure: Cardiac RHC;  Surgeon: Ramsey Turpin MD;  Location: [facility];  Service: Cardiology    HAND SURGERY      TENDON TRANSFER      Left lower arm        04/29/25 1210   PT Last Visit   PT Visit Date 04/29/25   Note Type   Note type Evaluation   Pain Assessment   Pain Assessment Tool 0-10   Pain Score No Pain  (discomfort with coughing)   Restrictions/Precautions   Weight Bearing Precautions Per Order No   Other Precautions Cognitive;Chair Alarm;Bed Alarm;Fall Risk;Pain;Telemetry;Multiple lines;Impulsive   Home Living   Type of Home House   Home Layout Two level  (Bilevel, 7+7 ENEDINA)   Bathroom Shower/Tub Tub/shower unit   Bathroom Toilet Standard   Bathroom Equipment Shower chair   Home Equipment   (02)   Prior Function   Level of [place] Independent with functional mobility   Lives With Spouse   Receives Help From Family   Falls in the last 6 months 0   Vocational Retired   Cognition   Orientation Level Oriented X4   Subjective   Subjective Pt agreeable with increased encouragement 2* to discomfort of coughing in sitting   RLE Assessment   RLE Assessment WFL   LLE  Assessment   LLE Assessment WFL   Coordination   Movements are Fluid and Coordinated 0   Coordination and Movement Description slow and guarded   Bed Mobility   Supine to Sit 4  Minimal assistance   Additional items Assist x 1   Sit to Supine 4  Minimal assistance   Additional items Assist x 1   Transfers   Sit to Stand 4  Minimal assistance   Additional items Assist x 1   Stand to Sit 4  Minimal assistance   Additional items Assist x 1   Ambulation/Elevation   Gait pattern Excessively slow;Shuffling;Decreased foot clearance;Forward Flexion;Narrow PRASANTH;Short stride   Gait Assistance 4  Minimal assist   Additional items Assist x 1   Assistive Device Other (Comment)  (HHA)   Distance 30+30   Balance   Static Sitting Fair   Dynamic Sitting Fair -   Static Standing Poor +   Dynamic Standing Poor +   Ambulatory Poor +   Endurance Deficit   Endurance Deficit Yes   Endurance Deficit Description limited by fatigue, weakness, pain, balance and Spo2   Activity Tolerance   Activity Tolerance Patient limited by fatigue;Patient limited by pain   Medical Staff Made Aware OT   Nurse Made Aware yes, nsg gave clearance to work with pt   Assessment   Prognosis Fair   Problem List Decreased strength;Decreased range of motion;Decreased endurance;Impaired balance;Decreased mobility;Decreased coordination;Decreased cognition;Impaired judgement;Decreased safety awareness;Pain   Assessment Pt is 74 y.o. female seen for PT evaluation s/p admit to [facility] on 4/28/2025 w/ COPD, severe (HCC) s/p BLVR. PT consulted to assess pt's functional mobility and d/c needs. Order placed for PT eval and tx, w/ up w/ A order. Comorbidities affecting pt's physical performance at time of assessment include:  has a past medical history of COPD (chronic obstructive pulmonary disease) (HCC), Diabetes mellitus (HCC), Hyperlipidemia, and Pulmonary nodule. PTA, pt was ambulates household distances and lives in multi-level home. Personal factors  affecting pt at time of IE include: inaccessible home environment, stairs to enter home, inability to ambulate household distances, unable to perform physical activity, inability to perform IADLs, and inability to perform ADLs. Please find objective findings from PT assessment regarding body systems outlined above with impairments and limitations including weakness, impaired balance, decreased endurance, gait deviations, pain, decreased activity tolerance, decreased functional mobility tolerance, and SOB upon exertion. Pt noted severe discomfort with increased coughing during sitting. Ambulated with forward flexed gait. No coughing during standing and gait. Required increased O2 during gait to 6L per RN 2* to desat during transfers earlier. The following objective measures performed on IE also reveal limitations: The patient's AM-PAC Basic Mobility Inpatient Short Form Raw Score is 14. A Raw score of less than or equal to 16 suggests the patient may benefit from discharge to post-acute rehabilitation services. Please also refer to the recommendation of the Physical Therapist for safe discharge planning. Pt's clinical presentation is currently unstable/unpredictable seen in pt's presentation of critical care monitoring. Pt to benefit from continued PT tx to address deficits as defined above and maximize level of functional independent mobility and consistency. From PT/mobility standpoint, recommendation at time of d/c would be level II pending progress in order to facilitate return to PLOF.   Barriers to Discharge Decreased caregiver support;Inaccessible home environment   Goals   Patient Goals To stop coughing   STG Expiration Date 05/11/25   Short Term Goal #1 1. Complete bed mobility and transfers I to decrease need for caregiver in home. 2. Ambulate 300' I to complete household and community mobility without A. 3. Improve dynamic balance to good to decrease need for UE support during ambulation. 4. Be educated &  demonstate 7+7 steps to be able to enter home without A.   Plan   Treatment/Interventions OT;Spoke to case management;Spoke to nursing;Gait training;Bed mobility;Patient/family training;Endurance training;Therapeutic exercise;LE strengthening/ROM;Functional transfer training   PT Frequency 3-5x/wk   Discharge Recommendation   Rehab Resource Intensity Level, PT II (Moderate Resource Intensity)   AM-PAC Basic Mobility Inpatient   Turning in Flat Bed Without Bedrails 3   Lying on Back to Sitting on Edge of Flat Bed Without Bedrails 3   Moving Bed to Chair 3   Standing Up From Chair Using Arms 2   Walk in Room 2   Climb 3-5 Stairs With Railing 1   Basic Mobility Inpatient Raw Score 14   Basic Mobility Standardized Score 35.55   [facility] Highest Level Of Mobility   -HLM Goal 4: Move to chair/commode   -HLM Achieved 7: Walk 25 feet or more           Dora Rooney, PT

## 2025-04-29 NOTE — DISCHARGE INSTR - AVS FIRST PAGE
Patient Instructions After Endobronchial Valve Procedure      It is important you follow these general instructions as you continue your recovery at home in the days and weeks after the [place] Valve Procedure. If you have any questions or concerns regarding these instructions, please discuss them with your treating doctor and his/her care team.      Your Care Team   Eliane Jimenez D.O. and Alfa Brown D.O. - Treating Physicians   Carolee Barnett - Interventional Pulmonary Coordinator    Nayla Collado RN - Advanced Lung Coordinator  [phone] (main office - routine issues, med refills)  [phone] (RN line - valve related issues)    Follow-up Visit  You should follow up with your physician as outlined in discharge instructions.  Please have your chest xray done prior to the visit.     Medications   You must continue all your prescribed inhalers, nebulizer treatments and oxygen therapy (if applicable) as per your discharge instructions.      Pneumothorax Risk   You are at risk of experiencing pneumothorax for the next several weeks to months, therefore it is important that you follow these instructions to reduce your risk and be aware of the symptoms.      Symptoms of pneumothorax include sudden onset of chest pain and shortness of breath.  Do not ignore these symptoms. If the symptoms are severe, report to the closest emergency department.       Wear your “Pneumothorax Risk” bracelet to alert medical personnel.      Please put your Implant/ Patient Information Card in your wallet in case of an emergency. It is also a good idea to keep a photo of your card in your phone.      If you develop symptoms of shortness of breath that is worse than usual, increase in wheezing, cough or sputum production and the symptoms are not severe, please call your physician for instructions.      Activity Restrictions   In the weeks following discharge from the hospital, only engage in light physical activity, as you are  able and comfortable. You can participate in activities such as self-care, light household chores, socializing with friends and family, and light exercise like walking on level ground.       Avoid raising your arms above shoulder level and bending over at the waist until your follow-up visit.       Limit lifting to no more than 5-10 pounds.       Do not resume pulmonary rehabilitation until it is cleared by your treating physician.      Travel Restrictions   Do not drive for the first few days after discharge. This is the amount of time usually required for you to regain the necessary mobility and strength to safely navigate and steer a car.      Avoid traveling out of the state and particularly by air for at least 1 week after discharge. If you have any problems or complications, this will help ensure you are within close proximity and will have easy access to the treating hospital for evaluation and treatment.      Returning to Work   Do not return to work full-time until you feel physically and mentally strong enough to resume our normal work activities.       Depending on your job and required activity level, you may not be able to return to your full set of work responsibilities for 1-2 weeks after the procedure.      If you have any questions or concerns or need special assistance regarding this instruction, please check with your doctor.      Other Symptoms/ Health Changes   Fatigue/Lack of Energy   You have just undergone a significant procedure so you may feel tired and have a lack of energy in the days and weeks following the procedure as your body heals and you regain your strength. You should take it slow and easy and listen to your own body during this time so you can appropriately pace yourself and avoid over doing it.       Decrease in Appetite   You may experience a temporary decrease in appetite as the medicines from your procedure. Your appetite should return to normal within 1-2 weeks following  the procedure. If your appetite is poor try to eat small, nutrient-rich meals.  Be sure you are drinking a lot of fluids throughout the day (6-8 cups per day unless instructed otherwise by your doctor.)       Pain/ Discomfort   You may experience some pain and discomfort in your nose, throat and airways.      Other Changes   You may experience increased shortness of breath accompanied by increased cough and/ or wheezing, increased sputum production, changes in sputum color or fever.  These symptoms may suggest you are having COPD exacerbation or an episode of pneumonia. Additionally, you may cough up blood streaks. It is also possible to cough up one of the valves.       It is important you call your family doctor and/or the treating doctor (as appropriate) if you experience any of these symptoms or other changes in your health that seem out of the ordinary or worrisome to you.       Finally, it is important that you understand that it could take weeks to months to get the full benefit from the procedure. Do not get discouraged if your progress is slow. Focus on the “small wins” every day.       We will plan to perform a chest CT and pulmonary function test in 6 weeks to measure your response to the procedure. These will be scheduled at your 1 week follow up visit.

## 2025-04-29 NOTE — OCCUPATIONAL THERAPY NOTE
Occupational Therapy Evaluation     Patient Name: Susanna Guevara  Today's Date: 4/29/2025  Problem List  Principal Problem:    COPD, severe (HCC)  Active Problems:    Type 2 diabetes mellitus without complication, without long-term current use of insulin (HCC)    HLD (hyperlipidemia)    Lung nodule    Tobacco abuse, in remission    Chronic hypoxic respiratory failure (HCC)    Past Medical History  Past Medical History:   Diagnosis Date    COPD (chronic obstructive pulmonary disease) (HCC)     Diabetes mellitus (HCC)     Hyperlipidemia     Pulmonary nodule 8/31/2017     Past Surgical History  Past Surgical History:   Procedure Laterality Date    CARDIAC CATHETERIZATION N/A 3/13/2025    Procedure: Cardiac RHC;  Surgeon: Ramsey Turpin MD;  Location: [facility];  Service: Cardiology    HAND SURGERY      TENDON TRANSFER      Left lower arm           04/29/25 1205   OT Last Visit   OT Visit Date 04/29/25   Note Type   Note type Evaluation   Pain Assessment   Pain Assessment Tool 0-10   Pain Score No Pain   Pain Location/Orientation   (reports no pain but reports chest hurts when coughing)   Restrictions/Precautions   Weight Bearing Precautions Per Order No   Other Precautions Cognitive;Chair Alarm;Bed Alarm;Multiple lines;Fall Risk;Pain;O2;Telemetry  (2L O2)   Home Living   Type of Home House   Home Layout Two level  (bilevel; 7 steps up/down)   Bathroom Shower/Tub Tub/shower unit   Bathroom Toilet Standard   Bathroom Equipment Shower chair   Home Equipment Other (Comment)  (denies any)   Additional Comments    Prior Function   Level of [place] Independent with ADLs;Modified independent with wheelchair;Needs assistance with IADLS   Lives With Spouse   Receives Help From Family   IADLs Independent with meal prep;Independent with medication management  (can drive but hasn't recently)   Falls in the last 6 months 0   Vocational Retired   Lifestyle   Autonomy I w/ ADLS, shares IADLS w/ spouse, not driving  "recently 2/2 coughing, I w/ transfers and functional mobility PTA   Reciprocal Relationships Pt lives w/ her spouse   Service to Others retired   Intrinsic Gratification staying busy with household chores   Subjective   Subjective \"Let's just get it over with\"   ADL   Eating Assistance 5  Supervision/Setup   Grooming Assistance 5  Supervision/Setup   UB Bathing Assistance 4  Minimal Assistance   LB Bathing Assistance 4  Minimal Assistance   UB Dressing Assistance 4  Minimal Assistance   LB Dressing Assistance 4  Minimal Assistance   Toileting Assistance  4  Minimal Assistance   Functional Assistance 4  Minimal Assistance   Functional Deficit Steadying;Verbal cueing;Supervision/safety;Increased time to complete   Bed Mobility   Supine to Sit 4  Minimal assistance   Additional items Assist x 1;HOB elevated;Increased time required;Verbal cues;LE management   Sit to Supine 4  Minimal assistance   Additional items Assist x 1;HOB elevated;Increased time required;Verbal cues;LE management   Additional Comments pt sat EOB w/ Fair sitting balance/trunk control; pt demonstrated increased coughing fits w/ sitting vs standing/supine position.   Transfers   Sit to Stand 4  Minimal assistance   Additional items Assist x 1;Increased time required;Verbal cues   Stand to Sit 4  Minimal assistance   Additional items Assist x 1;Increased time required;Verbal cues   Additional Comments HHA Used   Functional Mobility   Functional Mobility 4  Minimal assistance   Additional Comments pt engaged in short household distance functional mobility w/ Min A x1; HHA used   Additional items Hand hold assistance   Balance   Static Sitting Fair   Dynamic Sitting Fair -   Static Standing Poor +   Dynamic Standing Poor +   Ambulatory Poor +   Activity Tolerance   Activity Tolerance Patient limited by fatigue   Medical Staff Made Aware PTDora   Nurse Made Aware yes   RUE Assessment   RUE Assessment WFL   LUE Assessment   LUE Assessment WFL   Hand " Function   Gross Motor Coordination Functional   Fine Motor Coordination Functional   Psychosocial   Psychosocial (WDL) X   Patient Behaviors/Mood Anxious   Cognition   Overall Cognitive Status WFL   Arousal/Participation Responsive;Cooperative   Attention Attends with cues to redirect   Orientation Level Oriented X4   Memory Decreased recall of precautions   Following Commands Follows one step commands without difficulty   Comments pt is pleasant and cooperative; displays some increased anxiety/ needs cues for pacing   Assessment   Limitation Decreased ADL status;Decreased Safe judgement during ADL;Decreased endurance;Decreased high-level ADLs;Decreased self-care trans   Prognosis Fair   Assessment Pt is a 75 y/o female seen for OT eval s/p adm to [facility] w/ severe COPD. Pt admitted for elective bronchoscopic lung volume reduction. Pt  has a past medical history of COPD (chronic obstructive pulmonary disease) ([facility]), Diabetes mellitus ([facility]), Hyperlipidemia, and Pulmonary nodule (8/31/2017). Pt with active OT orders and activity as tolerated orders. Pt lives with her spouse in bilevel home w/ 7 [address]ED[address] . Pt was I w/  ADLS and IADLS, can drive but hasn't recently, & required no use of DME PTA. Pt is currently demonstrating the following occupational deficits: S UB ADLS, Min A LB ADLS, Min A bed mobility, transfers and functional mobility w/ HHA.These deficits that are impacting pt's baseline areas of occupation are a result of the following impairments: pain, endurance, activity tolerance, functional mobility, functional standing tolerance, decreased I w/ ADLS/IADLS, and decreased safety awareness.The following Occupational Performance Areas to address include: bathing/shower, toilet hygiene, dressing, medication management, health maintenance, functional mobility, community mobility, clothing management, and household maintenance. Recommend moderate resource intensity (level ll) upon D/C. Pt to continue to benefit from acute  no immediate OT services to address the following goals 2-3x/week to  w/in 10-14 days:   Goals   Patient Goals to stop coughing   LTG Time Frame 10-14   Long Term Goal #1 see below listed goals   Plan   Treatment Interventions ADL retraining;Functional transfer training;Endurance training;Patient/family training;Equipment evaluation/education;Compensatory technique education;Continued evaluation;Energy conservation;Activityengagement   Goal Expiration Date 25   OT Frequency 2-3x/wk   Discharge Recommendation   Rehab Resource Intensity Level, OT II (Moderate Resource Intensity)   Additional Comments  The patient's raw score on the AM-PAC Daily Activity Inpatient Short Form is 17. A raw score of less than 19 suggests the patient may benefit from discharge to post-acute rehabilitation services. Please refer to the recommendation of the Occupational Therapist for safe discharge planning.   Additional Comments 2 Pt seen as a co-session due to the patient's co-morbidities, clinically unstable presentation, and present impairments which are a regression from the patient's baseline.   AM-PAC Daily Activity Inpatient   Lower Body Dressing 2   Bathing 2   Toileting 2   Upper Body Dressing 3   Grooming 4   Eating 4   Daily Activity Raw Score 17   Daily Activity Standardized Score (Calc for Raw Score >=11) 37.26   AM-PAC Applied Cognition Inpatient   Following a Speech/Presentation 3   Understanding Ordinary Conversation 4   Taking Medications 4   Remembering Where Things Are Placed or Put Away 4   Remembering List of 4-5 Errands 3   Taking Care of Complicated Tasks 3   Applied Cognition Raw Score 21   Applied Cognition Standardized Score 44.3   End of Consult   Education Provided Yes   Patient Position at End of Consult Supine;Bed/Chair alarm activated;All needs within reach   Nurse Communication Nurse aware of consult       GOALS    1) Pt will improve activity tolerance to G for 30 min txment sessions for increased  engagement in functional tasks    2) Pt will complete UB/LB dressing/self care w/ mod I using adaptive device and DME as needed    3) Pt will complete bathing w/ Mod I w/ use of AE and DME as needed    4) Pt will complete toileting w/ mod I w/ G hygiene/thoroughness using DME as needed    5) Pt will improve functional transfers to Mod I on/off all surfaces using DME as needed w/ G balance/safety     6) Pt will improve functional mobility during ADL/IADL/leisure tasks to Mod I using DME as needed w/ G balance/safety     7) Pt will improve bed mobility to Mod I and sit EOB w/ G balance/trunk control as a prerequisite for further engagement in meaningful tasks    8) Pt will be attentive 100% of the time during ongoing cognitive assessment w/ G participation to assist w/ safe d/c planning/recommendations    9) Pt will demonstrate G carryover of pt/caregiver education and training as appropriate w/o cues w/ good tolerance to increase safety during functional tasks    10) Pt will demonstrate 100% carryover of energy conservation techniques t/o functional I/ADL/leisure tasks w/o cues s/p skilled education to increase endurance during functional tasks     11) Pt will participate in simulated IADL management task to increase independence to Mod I w/ G safety and endurance    Anne Marie Turk MS, OTR/L

## 2025-04-29 NOTE — PLAN OF CARE
Problem: PHYSICAL THERAPY ADULT  Goal: Performs mobility at highest level of function for planned discharge setting.  See evaluation for individualized goals.  Description: Treatment/Interventions: OT, Spoke to case management, Spoke to nursing, Gait training, Bed mobility, Patient/family training, Endurance training, Therapeutic exercise, LE strengthening/ROM, Functional transfer training          See flowsheet documentation for full assessment, interventions and recommendations.  Note: Prognosis: Fair  Problem List: Decreased strength, Decreased range of motion, Decreased endurance, Impaired balance, Decreased mobility, Decreased coordination, Decreased cognition, Impaired judgement, Decreased safety awareness, Pain  Assessment: Pt is 74 y.o. female seen for PT evaluation s/p admit to [facility] on 4/28/2025 w/ COPD, severe (HCC) s/p BLVR. PT consulted to assess pt's functional mobility and d/c needs. Order placed for PT eval and tx, w/ up w/ A order. Comorbidities affecting pt's physical performance at time of assessment include:  has a past medical history of COPD (chronic obstructive pulmonary disease) (HCC), Diabetes mellitus (HCC), Hyperlipidemia, and Pulmonary nodule. PTA, pt was ambulates household distances and lives in multi-level home. Personal factors affecting pt at time of IE include: inaccessible home environment, stairs to enter home, inability to ambulate household distances, unable to perform physical activity, inability to perform IADLs, and inability to perform ADLs. Please find objective findings from PT assessment regarding body systems outlined above with impairments and limitations including weakness, impaired balance, decreased endurance, gait deviations, pain, decreased activity tolerance, decreased functional mobility tolerance, and SOB upon exertion. Pt noted severe discomfort with increased coughing during sitting. Ambulated with forward flexed gait. No coughing during  standing and gait. Required increased O2 during gait to 6L per RN 2* to desat during transfers earlier. The following objective measures performed on IE also reveal limitations: The patient's AM-PAC Basic Mobility Inpatient Short Form Raw Score is 14. A Raw score of less than or equal to 16 suggests the patient may benefit from discharge to post-acute rehabilitation services. Please also refer to the recommendation of the Physical Therapist for safe discharge planning. Pt's clinical presentation is currently unstable/unpredictable seen in pt's presentation of critical care monitoring. Pt to benefit from continued PT tx to address deficits as defined above and maximize level of functional independent mobility and consistency. From PT/mobility standpoint, recommendation at time of d/c would be level II pending progress in order to facilitate return to PLOF.  Barriers to Discharge: Decreased caregiver support, Inaccessible home environment     Rehab Resource Intensity Level, PT: II (Moderate Resource Intensity)    See flowsheet documentation for full assessment.

## 2025-04-29 NOTE — PROCEDURES
POC Lung US    Date/Time: 4/29/2025 3:03 AM    Performed by: Jimi Dallas DO  Authorized by: Jimi Dallas DO    Patient location:  ICU  Procedure details:     Exam Type:  Diagnostic    Indications: dyspnea and hypoxia      Assessment / Evaluation for:  Pneumothorax, hemothorax and pleural effusion    Structures Visualized: pleural line, rib, left hemithorax and right hemithorax      Exam Type: initial exam      Image quality: diagnostic      Image availability:  Images available in PACS, still images obtained and video obtained  Left Hemithorax Findings:     Left pleura visualized:  Visualized and good quality    Left Hemithorax Findings: normal      Left lung findings: normal interstitium    Right Lung Findings:     Right pleural visualized:  Visualized and good quality    Right hemithorax findings: normal      Right lung findings: normal interstitium    Interpretation:     Findings: normal thoracic ultrasound

## 2025-04-29 NOTE — PROGRESS NOTES
Progress Note - Critical Care/ICU   Name: Susanna Guevara 74 y.o. female I MRN: [id number]  Unit/Bed#: MICU 11 I Date of Admission: 2025   Date of Service: 2025 I Hospital Day: 1       Critical Care Attending Note    74 year old woman with Severe COPD (FEV1 28-43%) on Trelegy 100mcg, chronic hypoxic respiratory failure (3L/min NC with 6L/min exertion), CAD, DLP, DM2 presented for BLVR. She underwent 2 [place] valves placed into SHIN/[place]     24hr events - noted coughing episodes with associated tachycardia and some desaturations, repeat CXR w/o PTX overnight, notes intermittent coughing fits.  No sputum production    VS AF, HR 's, MAPS 60-80's, SpO2 98% 4LNC, I/Os +1.1L  Exam  GEN older woman in bed, conversant, nontoxic  HEENT MMM, no thrush  NECK trachea midline, no crepitus, no accessory muscle use  CV reg, intermittently tachy, single s1/2, no m/r  Pulm diminished BS diffusely, no wheeze or rales, no pleural rubs  ABD +BS soft NTND, no rebound  EXT warm, brisk cap refill, no edema    Laboratory and Diagnostics  Results from last 7 days   Lab Units 25  0506 25  1355   WBC Thousand/uL 11.60* 10.04   HEMOGLOBIN g/dL 12.3 13.0   HEMATOCRIT % 37.6 39.7   PLATELETS Thousands/uL 247 257   SEGS PCT %  --  52   MONO PCT %  --  8   EOS PCT %  --  2     Results from last 7 days   Lab Units 25  0506 25  1355   SODIUM mmol/L 137 138   POTASSIUM mmol/L 4.1 3.9   CHLORIDE mmol/L 100 102   CO2 mmol/L 29 30   ANION GAP mmol/L 8 6   BUN mg/dL 11 15   CREATININE mg/dL 0.71 0.65   CALCIUM mg/dL 8.7 9.5   GLUCOSE RANDOM mg/dL 167* 140   ALT U/L 15  --    AST U/L 18  --    ALK PHOS U/L 77  --    ALBUMIN g/dL 4.0  --    TOTAL BILIRUBIN mg/dL 0.47  --      Results from last 7 days   Lab Units 25  0506 25  1355   MAGNESIUM mg/dL 2.0 1.8*   PHOSPHORUS mg/dL 3.4  --       AB2025 - 7.     MICRO  SHIN [place]  - NGTD     RADIOGRAPHS - New images and reports personally  reviewed  CXR 4/29 - POD1 film - early SHIN volume loss, no PTX appreciated    CXR 4/28 - in OR - no PTX appreciated     RHC 3/2025  RA 9  RV 30/9  PA 30/16, 21  PCWP 12  TPG 9  DPG 4  PA sat 72.6%  Yi CO/CI: 4.6/2.8  PVR 2.0     TTE 2/2025 - EF 60%, mod dilated RV, RVSP 26mmHg     [place]/DLCO 3/20/2025 - Ratio 64%, FVC 35%, FEV1 28%, DLCO 49%     PFTs 1/31/2025 - Ratio 48%, FVC 60%, FEV1 36%, post BD FEV1 43% - 20% change, %, %, unable to perform DLCO      Assessment  Severe COPD post BLVR SHIN x 2 valves  Acute/chronic hypoxic respiratory failure  Chronic mild RV dilation with mean PAP 21mmHG  DM2  Post bronchoscopy cough  Reported allergy prednisone/methylprednisone  Nicotine dependence in remission     PLAN  Standard post BLVR CXR images -daily and prn for now  PTX cart at bedside   Continue xopenex/atrovent nebs q6hrs, pulmicort/perforomist nebs BID  Hold on systemic steroids at this time but will reassess closely  Escalate cough regimen with scheduled tussionex and tessalon perles, prn low dose dilaudid, may need intermittent lidocaine nebs  PT/OT and ambulation trials  Titrate O2 to keep SpO2 90-94% - monitor needs with ambulation  Continue diet if not using lidocaine nebs, bowel regimen  ISS for now  SCDs/LMWH  Downgrade SDU1 status on [facility] [place] [facility] to the [facility] service    I have personally provided 0 minutes of critical care time, exclusive of procedures, teaching, and any prior time recorded by providers other than myself. Time includes review of laboratory data, radiology results, discussion with consultants, and monitoring for potential decompensation.    Alfa Brown, DO

## 2025-04-29 NOTE — SEPSIS NOTE
Sepsis Note   Susanna Guevara 74 y.o. female MRN: [id number]  [facility] Encounter: [id number]       Initial Sepsis Screening       Row Name 04/28/25 9402                Is the patient's history suggestive of a new or worsening infection? No  -GG                  User Key  (r) = Recorded By, (t) = Taken By, (c) = Cosigned By      Initials Name Provider Type    GG Steffen Schofield MD Fellow                        Body mass index is 28.68 kg/m².  Wt Readings from Last 1 Encounters:   04/28/25 64.4 kg (141 lb 15.6 oz)     IBW (Ideal Body Weight): 43.2 kg    Ideal body weight: 48.8 kg (107 lb 8.4 oz)  Adjusted ideal body weight: 55 kg (121 lb 4.9 oz)

## 2025-04-29 NOTE — PLAN OF CARE
Problem: OCCUPATIONAL THERAPY ADULT  Goal: Performs self-care activities at highest level of function for planned discharge setting.  See evaluation for individualized goals.  Description: Treatment Interventions: ADL retraining, Functional transfer training, Endurance training, Patient/family training, Equipment evaluation/education, Compensatory technique education, Continued evaluation, Energy conservation, Activityengagement          See flowsheet documentation for full assessment, interventions and recommendations.   Note: Limitation: Decreased ADL status, Decreased Safe judgement during ADL, Decreased endurance, Decreased high-level ADLs, Decreased self-care trans  Prognosis: Fair  Assessment: Pt is a 75 y/o female seen for OT eval s/p adm to [facility] w/ severe COPD. Pt admitted for elective bronchoscopic lung volume reduction. Pt  has a past medical history of COPD (chronic obstructive pulmonary disease) ([facility]), Diabetes mellitus (HCC), Hyperlipidemia, and Pulmonary nodule (8/31/2017). Pt with active OT orders and activity as tolerated orders. Pt lives with her spouse in bilevel home w/ 7 ENEDINA . Pt was I w/  ADLS and IADLS, can drive but hasn't recently, & required no use of DME PTA. Pt is currently demonstrating the following occupational deficits: S UB ADLS, Min A LB ADLS, Min A bed mobility, transfers and functional mobility w/ HHA.These deficits that are impacting pt's baseline areas of occupation are a result of the following impairments: pain, endurance, activity tolerance, functional mobility, functional standing tolerance, decreased I w/ ADLS/IADLS, and decreased safety awareness.The following Occupational Performance Areas to address include: bathing/shower, toilet hygiene, dressing, medication management, health maintenance, functional mobility, community mobility, clothing management, and household maintenance. Recommend moderate resource intensity (level ll) upon D/C. Pt to continue to benefit from  acute immediate OT services to address the following goals 2-3x/week to  w/in 10-14 days:     Rehab Resource Intensity Level, OT: II (Moderate Resource Intensity)     Anne Marie Turk MS, OTR/L

## 2025-04-30 ENCOUNTER — APPOINTMENT (OUTPATIENT)
Dept: PULMONOLOGY | Facility: CLINIC | Age: 75
End: 2025-04-30
Payer: MEDICARE

## 2025-04-30 ENCOUNTER — APPOINTMENT (INPATIENT)
Dept: RADIOLOGY | Facility: HOSPITAL | Age: 75
DRG: 163 | End: 2025-04-30
Payer: MEDICARE

## 2025-04-30 DIAGNOSIS — J44.9 COPD, SEVERE (HCC): Primary | ICD-10-CM

## 2025-04-30 LAB
ANION GAP SERPL CALCULATED.3IONS-SCNC: 6 MMOL/L (ref 4–13)
BACTERIA BRONCH AEROBE CULT: NORMAL
BASOPHILS # BLD AUTO: 0.02 THOUSANDS/ÂΜL (ref 0–0.1)
BASOPHILS NFR BLD AUTO: 0 % (ref 0–1)
BUN SERPL-MCNC: 7 MG/DL (ref 5–25)
CALCIUM SERPL-MCNC: 9 MG/DL (ref 8.4–10.2)
CHLORIDE SERPL-SCNC: 98 MMOL/L (ref 96–108)
CO2 SERPL-SCNC: 31 MMOL/L (ref 21–32)
CREAT SERPL-MCNC: 0.67 MG/DL (ref 0.6–1.3)
EOSINOPHIL # BLD AUTO: 0.31 THOUSAND/ÂΜL (ref 0–0.61)
EOSINOPHIL NFR BLD AUTO: 4 % (ref 0–6)
ERYTHROCYTE [DISTWIDTH] IN BLOOD BY AUTOMATED COUNT: 12.6 % (ref 11.6–15.1)
GFR SERPL CREATININE-BSD FRML MDRD: 86 ML/MIN/1.73SQ M
GLUCOSE SERPL-MCNC: 115 MG/DL (ref 65–140)
GLUCOSE SERPL-MCNC: 134 MG/DL (ref 65–140)
GLUCOSE SERPL-MCNC: 136 MG/DL (ref 65–140)
GLUCOSE SERPL-MCNC: 241 MG/DL (ref 65–140)
GRAM STN SPEC: NORMAL
HCT VFR BLD AUTO: 37.7 % (ref 34.8–46.1)
HGB BLD-MCNC: 12.1 G/DL (ref 11.5–15.4)
IMM GRANULOCYTES # BLD AUTO: 0.02 THOUSAND/UL (ref 0–0.2)
IMM GRANULOCYTES NFR BLD AUTO: 0 % (ref 0–2)
LYMPHOCYTES # BLD AUTO: 2.67 THOUSANDS/ÂΜL (ref 0.6–4.47)
LYMPHOCYTES NFR BLD AUTO: 34 % (ref 14–44)
MCH RBC QN AUTO: 31.3 PG (ref 26.8–34.3)
MCHC RBC AUTO-ENTMCNC: 32.1 G/DL (ref 31.4–37.4)
MCV RBC AUTO: 98 FL (ref 82–98)
MONOCYTES # BLD AUTO: 0.47 THOUSAND/ÂΜL (ref 0.17–1.22)
MONOCYTES NFR BLD AUTO: 6 % (ref 4–12)
NEUTROPHILS # BLD AUTO: 4.4 THOUSANDS/ÂΜL (ref 1.85–7.62)
NEUTS SEG NFR BLD AUTO: 56 % (ref 43–75)
NRBC BLD AUTO-RTO: 0 /100 WBCS
PLATELET # BLD AUTO: 224 THOUSANDS/UL (ref 149–390)
PMV BLD AUTO: 9.1 FL (ref 8.9–12.7)
POTASSIUM SERPL-SCNC: 3.7 MMOL/L (ref 3.5–5.3)
RBC # BLD AUTO: 3.86 MILLION/UL (ref 3.81–5.12)
SODIUM SERPL-SCNC: 135 MMOL/L (ref 135–147)
WBC # BLD AUTO: 7.89 THOUSAND/UL (ref 4.31–10.16)

## 2025-04-30 PROCEDURE — NC001 PR NO CHARGE: Performed by: INTERNAL MEDICINE

## 2025-04-30 PROCEDURE — 85025 COMPLETE CBC W/AUTO DIFF WBC: CPT

## 2025-04-30 PROCEDURE — 94640 AIRWAY INHALATION TREATMENT: CPT

## 2025-04-30 PROCEDURE — 99232 SBSQ HOSP IP/OBS MODERATE 35: CPT | Performed by: INTERNAL MEDICINE

## 2025-04-30 PROCEDURE — 94760 N-INVAS EAR/PLS OXIMETRY 1: CPT

## 2025-04-30 PROCEDURE — 30233N1 TRANSFUSION OF NONAUTOLOGOUS RED BLOOD CELLS INTO PERIPHERAL VEIN, PERCUTANEOUS APPROACH: ICD-10-PCS

## 2025-04-30 PROCEDURE — 80048 BASIC METABOLIC PNL TOTAL CA: CPT

## 2025-04-30 PROCEDURE — 94761 N-INVAS EAR/PLS OXIMETRY MLT: CPT

## 2025-04-30 PROCEDURE — 71045 X-RAY EXAM CHEST 1 VIEW: CPT

## 2025-04-30 PROCEDURE — 82948 REAGENT STRIP/BLOOD GLUCOSE: CPT

## 2025-04-30 PROCEDURE — 94664 DEMO&/EVAL PT USE INHALER: CPT

## 2025-04-30 RX ORDER — POTASSIUM CHLORIDE 1500 MG/1
20 TABLET, EXTENDED RELEASE ORAL ONCE
Status: DISCONTINUED | OUTPATIENT
Start: 2025-04-30 | End: 2025-04-30

## 2025-04-30 RX ORDER — PRAMIPEXOLE DIHYDROCHLORIDE 0.25 MG/1
0.25 TABLET ORAL 3 TIMES DAILY
Status: DISCONTINUED | OUTPATIENT
Start: 2025-04-30 | End: 2025-05-01 | Stop reason: HOSPADM

## 2025-04-30 RX ORDER — HYDROMORPHONE HCL IN WATER/PF 6 MG/30 ML
0.2 PATIENT CONTROLLED ANALGESIA SYRINGE INTRAVENOUS EVERY 4 HOURS PRN
Status: DISCONTINUED | OUTPATIENT
Start: 2025-04-30 | End: 2025-05-01 | Stop reason: HOSPADM

## 2025-04-30 RX ORDER — SENNOSIDES 8.6 MG
1 TABLET ORAL
Status: DISCONTINUED | OUTPATIENT
Start: 2025-04-30 | End: 2025-05-01 | Stop reason: HOSPADM

## 2025-04-30 RX ORDER — LEVALBUTEROL INHALATION SOLUTION 1.25 MG/3ML
1.25 SOLUTION RESPIRATORY (INHALATION)
Status: DISCONTINUED | OUTPATIENT
Start: 2025-04-30 | End: 2025-05-01 | Stop reason: HOSPADM

## 2025-04-30 RX ORDER — POTASSIUM CHLORIDE 20MEQ/15ML
20 LIQUID (ML) ORAL ONCE
Status: COMPLETED | OUTPATIENT
Start: 2025-04-30 | End: 2025-04-30

## 2025-04-30 RX ADMIN — BENZONATATE 100 MG: 100 CAPSULE ORAL at 21:18

## 2025-04-30 RX ADMIN — LEVALBUTEROL HYDROCHLORIDE 1.25 MG: 1.25 SOLUTION RESPIRATORY (INHALATION) at 14:24

## 2025-04-30 RX ADMIN — ALBUTEROL SULFATE 2 PUFF: 90 AEROSOL, METERED RESPIRATORY (INHALATION) at 14:10

## 2025-04-30 RX ADMIN — PRAMIPEXOLE DIHYDROCHLORIDE 0.25 MG: 0.25 TABLET ORAL at 09:03

## 2025-04-30 RX ADMIN — PRAMIPEXOLE DIHYDROCHLORIDE 0.25 MG: 0.25 TABLET ORAL at 21:23

## 2025-04-30 RX ADMIN — BUDESONIDE 0.5 MG: 0.5 INHALANT RESPIRATORY (INHALATION) at 19:23

## 2025-04-30 RX ADMIN — IPRATROPIUM BROMIDE 0.5 MG: 0.5 SOLUTION RESPIRATORY (INHALATION) at 14:24

## 2025-04-30 RX ADMIN — ATORVASTATIN CALCIUM 40 MG: 40 TABLET, FILM COATED ORAL at 16:21

## 2025-04-30 RX ADMIN — IPRATROPIUM BROMIDE 0.5 MG: 0.5 SOLUTION RESPIRATORY (INHALATION) at 07:17

## 2025-04-30 RX ADMIN — PRAMIPEXOLE DIHYDROCHLORIDE 0.25 MG: 0.25 TABLET ORAL at 16:21

## 2025-04-30 RX ADMIN — ENOXAPARIN SODIUM 40 MG: 40 INJECTION SUBCUTANEOUS at 08:20

## 2025-04-30 RX ADMIN — LEVALBUTEROL HYDROCHLORIDE 1.25 MG: 1.25 SOLUTION RESPIRATORY (INHALATION) at 07:17

## 2025-04-30 RX ADMIN — ACETAMINOPHEN 1000 MG: 10 INJECTION INTRAVENOUS at 10:15

## 2025-04-30 RX ADMIN — BENZONATATE 100 MG: 100 CAPSULE ORAL at 07:45

## 2025-04-30 RX ADMIN — HYDROMORPHONE HYDROCHLORIDE 0.2 MG: 0.2 INJECTION, SOLUTION INTRAMUSCULAR; INTRAVENOUS; SUBCUTANEOUS at 19:03

## 2025-04-30 RX ADMIN — IPRATROPIUM BROMIDE 0.5 MG: 0.5 SOLUTION RESPIRATORY (INHALATION) at 02:23

## 2025-04-30 RX ADMIN — LEVALBUTEROL HYDROCHLORIDE 1.25 MG: 1.25 SOLUTION RESPIRATORY (INHALATION) at 02:23

## 2025-04-30 RX ADMIN — POTASSIUM CHLORIDE 20 MEQ: 20 SOLUTION ORAL at 11:52

## 2025-04-30 RX ADMIN — IPRATROPIUM BROMIDE 0.5 MG: 0.5 SOLUTION RESPIRATORY (INHALATION) at 19:23

## 2025-04-30 RX ADMIN — BENZONATATE 100 MG: 100 CAPSULE ORAL at 16:21

## 2025-04-30 RX ADMIN — HYDROCODONE POLISTIREX AND CHLORPHENIRAMINE POLISTIREX 5 ML: 10; 8 SUSPENSION, EXTENDED RELEASE ORAL at 21:18

## 2025-04-30 RX ADMIN — ALBUTEROL SULFATE 2 PUFF: 90 AEROSOL, METERED RESPIRATORY (INHALATION) at 08:21

## 2025-04-30 RX ADMIN — LEVALBUTEROL HYDROCHLORIDE 1.25 MG: 1.25 SOLUTION RESPIRATORY (INHALATION) at 19:23

## 2025-04-30 RX ADMIN — FORMOTEROL FUMARATE DIHYDRATE 20 MCG: 20 SOLUTION RESPIRATORY (INHALATION) at 07:51

## 2025-04-30 RX ADMIN — HYDROCODONE POLISTIREX AND CHLORPHENIRAMINE POLISTIREX 5 ML: 10; 8 SUSPENSION, EXTENDED RELEASE ORAL at 07:45

## 2025-04-30 RX ADMIN — BUDESONIDE 0.5 MG: 0.5 INHALANT RESPIRATORY (INHALATION) at 07:17

## 2025-04-30 RX ADMIN — SENNOSIDES 8.6 MG: 8.6 TABLET, FILM COATED ORAL at 21:23

## 2025-04-30 RX ADMIN — FORMOTEROL FUMARATE DIHYDRATE 20 MCG: 20 SOLUTION RESPIRATORY (INHALATION) at 19:35

## 2025-04-30 NOTE — RESPIRATORY THERAPY NOTE
Home Oxygen Qualifying Test     Patient name: Susanna Guevara        : 1950   Date of Test:  2025  Diagnosis:    Home Oxygen Test:    **Medicare Guidelines require item(s) 1-5 on all ambulatory patients or 1 and 2 on non-ambulatory patients.    1. Baseline SPO2 on Room Air at rest 85 %   If <= 88% on Room Air add O2 via NC to obtain SpO2 >=88%. If LPM needed, document LPM 2 needed to reach =>88%    SPO2 during exertion on Room Air   %  During exertion monitor SPO2. If SPO2 increases >=89%, do not add supplemental oxygen    SPO2 on Oxygen at Rest 92 % at 2 LPM    SPO2 during exertion on Oxygen 90 % at 8 LPM    Test performed during exertion activity.      [x]  Supplemental Home Oxygen is indicated.    []  Client does not qualify for home oxygen.    Respiratory Additional Notes- Pt required 3L of O2 while at rest to maintain an Spo2 of 88% or greater. Pt ambulated for 6 mins, pt required 8L of O2 while ambulating to maintain an Spo2 of 88% or greater.     Segundo Adkins, RT     
You can access the FollowMyHealth Patient Portal offered by [facility] by registering at the following website: [email or web address]. By joining [facility]’s FollowMyHealth portal, you will also be able to view your health information using other applications (apps) compatible with our system.

## 2025-04-30 NOTE — PROGRESS NOTES
Progress Note - Critical Care/ICU   Name: Susanna Guevara 74 y.o. female I MRN: [id number]  Unit/Bed#: MICU 11 I Date of Admission: 2025   Date of Service: 2025 I Hospital Day: 2       Critical Care Attending Note    74 year old woman with Severe COPD (FEV1 28-43%) on Trelegy 100mcg, chronic hypoxic respiratory failure (3L/min NC with 6L/min exertion), CAD, DLP, DM2 presented for BLVR. She underwent 2 [place] valves placed into SHIN/[place]     24hr events - feeling improved, reduced cough, some with exertion, noted 6L/min with exertion, used rescue inhaler this am post restroom but able to walk hallway, scant blood streaked sputum x 1.  Eager to go home tomorrow     VS AF, -120's, MAPs 's, SpO2 98% 3LNC  Exam  GEN older woman in bed, conversant, nontoxic no cough  HEENT MMM, no thrush, no scleral icterus   NECK no accessory muscle use, no stridor, trachea midline   CV mildly tachy single s1/2, no m/r  Pulm diminished BS diffusely, no wheeze or rales   EXT warm, no edema, brisk cap refill     Laboratory and Diagnostics  Results from last 7 days   Lab Units 25  0506 25  1355   WBC Thousand/uL 11.60* 10.04   HEMOGLOBIN g/dL 12.3 13.0   HEMATOCRIT % 37.6 39.7   PLATELETS Thousands/uL 247 257   SEGS PCT %  --  52   MONO PCT %  --  8   EOS PCT %  --  2     Results from last 7 days   Lab Units 25  0506 25  1355   SODIUM mmol/L 137 138   POTASSIUM mmol/L 4.1 3.9   CHLORIDE mmol/L 100 102   CO2 mmol/L 29 30   ANION GAP mmol/L 8 6   BUN mg/dL 11 15   CREATININE mg/dL 0.71 0.65   CALCIUM mg/dL 8.7 9.5   GLUCOSE RANDOM mg/dL 167* 140   ALT U/L 15  --    AST U/L 18  --    ALK PHOS U/L 77  --    ALBUMIN g/dL 4.0  --    TOTAL BILIRUBIN mg/dL 0.47  --      Results from last 7 days   Lab Units 25  0506 25  1355   MAGNESIUM mg/dL 2.0 1.8*   PHOSPHORUS mg/dL 3.4  --       AB2025 - 7.     MICRO  SHIN [place]  - NGTD, neg AFB     RADIOGRAPHS - New images and  reports personally reviewed  CXR 4/30 - POD2 - SHIN volume loss, elevation HD, no PTX    CXR 4/29 - POD1 film - early SHIN volume loss, no PTX appreciated     CXR 4/28 - in OR - no PTX appreciated     RHC 3/2025  RA 9  RV 30/9  PA 30/16, 21  PCWP 12  TPG 9  DPG 4  PA sat 72.6%  Yi CO/CI: 4.6/2.8  PVR 2.0     TTE 2/2025 - EF 60%, mod dilated RV, RVSP 26mmHg     [place]/DLCO 3/20/2025 - Ratio 64%, FVC 35%, FEV1 28%, DLCO 49%     PFTs 1/31/2025 - Ratio 48%, FVC 60%, FEV1 36%, post BD FEV1 43% - 20% change, %, %, unable to perform DLCO      Assessment  Severe COPD post BLVR SHIN x 2 valves  Acute/chronic hypoxic respiratory failure  Chronic mild RV dilation with mean PAP 21mmHG  DM2  Post bronchoscopy cough  Reported allergy prednisone/methylprednisone  Nicotine dependence in remission     PLAN  Standard post BLVR CXR images -daily and prn for now  PTX cart at bedside   Continue xopenex/atrovent nebs - reduce to TID at her reqest, pulmicort/perforomist nebs BID  Hold on systemic steroids at this time   Cont scheduled tussionex and tessalon perles, prn low dose dilaudid for today  PT/OT and ambulation trials  Titrate O2 to keep SpO2 90-94% - monitor needs with ambulation  Continue diet if not using lidocaine nebs, bowel regimen  ISS for now  SCDs/LMWH  Lab holiday tomorrow  Remain SDU1 status on [facility]  Discuss with IP team duration of admission - 3 vs 4 night stay post BLVR      I have personally provided 0 minutes of critical care time, exclusive of procedures, teaching, and any prior time recorded by providers other than myself. Time includes review of laboratory data, radiology results, discussion with consultants, and monitoring for potential decompensation.    Alfa Brown, DO

## 2025-04-30 NOTE — PROGRESS NOTES
Progress Note - Critical Care/ICU   Name: Susanna Geuvara 74 y.o. female I MRN: [id number]  Unit/Bed#: MICU 11 I Date of Admission: 4/28/2025   Date of Service: 4/30/2025 I Hospital Day: 2      Assessment & Plan   Active Hospital Problems    Diagnosis Date Noted POA   • COPD, severe (HCC) 08/28/2017 Yes     Chronic   • Chronic hypoxic respiratory failure (HCC) 08/22/2024 Yes   • Lung nodule 08/31/2017 Yes   • Tobacco abuse, in remission 05/24/2019 Yes   • HLD (hyperlipidemia) 08/28/2017 Yes   • Type 2 diabetes mellitus without complication, without long-term current use of insulin (HCC) 08/28/2017 Yes      Resolved Hospital Problems   No resolved problems to display.       Neuro:   No active issues      CV:   Sinus Tachycardia  F/U CBC  Patient persistently in sinus tachy    HLD  Continue home lipitor w diet      Pulm:  Diagnosis: Severe COPD, Chronic hypoxic respiratory failure, POD 1 Bronchoscopic Lung Valve Reduction, previous smoker w 47 pack years (quit 8 years ago)  CHRF:  Likely due to COPD  Requires 3 - 6L at home      Severe COPD POD 3 BLVR   Continued to be dyspneic despite maximal medical therapy with ICS/LABA/LAMA  Home regimen: Trelegy Ellipta, Xopenex nebulized solution, Albuterol rescue inhaler   CXR 1 hr, 4 hr, 8 hr, 24 hr postoperatively, POD 2, POD 3   Antitussives: Tessalon perles TID, Tussionex q12  Nebulized Treatments: Xopenex/Atrovent, inhaled lidocaine, Perforomist, Pulmicort   GI:   Monitor BM postoperatively  Start bowel regimen with senna and miralax      :   Cr at baseline, Ur output adequate      F/E/N:   F euvolemic  E replete prn  N tolerating regular diet      Heme/Onc: Leukocytosis   Leukocytosis:  Will order CBC to monitor   Lovenox for DVT ppx      Endo:   Diabeters, last A1c was 7.1  On Metformin 500 at home   SSI     ID:        MSK/Skin:   None  Disposition: Stepdown Level 1    ICU Core Measures     A: Assess, Prevent, and Manage Pain Has pain been assessed? Yes  Need for  changes to pain regimen? No   B: Both SAT/SAT  N/A   C: Choice of Sedation RASS Goal: 0 Alert and Calm  Need for changes to sedation or analgesia regimen? No   D: Delirium CAM-ICU: Negative   E: Early Mobility  Plan for early mobility? Yes   F: Family Engagement Plan for family engagement today? Yes         Prophylaxis:  VTE VTE covered by:  enoxaparin, Subcutaneous, 40 mg at 04/29/25 0812       Stress Ulcer  not ordered         24 Hour Events :   Had a brief episode of anxiety, for which she was given PRN Dilaudid     Subjective       Objective :                   Vitals I/O      Most Recent Min/Max in 24hrs   Temp 98.5 °F (36.9 °C) Temp  Min: 97.9 °F (36.6 °C)  Max: 98.6 °F (37 °C)   Pulse (!) 108 Pulse  Min: 95  Max: 141   Resp (!) 39 Resp  Min: 21  Max: 63   /74 BP  Min: 102/62  Max: 141/74   O2 Sat 98 % SpO2  Min: 77 %  Max: 98 %      Intake/Output Summary (Last 24 hours) at 4/30/2025 0752  Last data filed at 4/29/2025 1901  Gross per 24 hour   Intake 680 ml   Output --   Net 680 ml       Diet Jayy/CHO Controlled; Consistent Carbohydrate Diet Level 1 (4 carb servings/60 grams CHO/meal)    Invasive Monitoring           Physical Exam   Physical Exam  Cardiovascular:      Rate and Rhythm: Regular rhythm. Tachycardia present.      Heart sounds: Normal heart sounds.   Musculoskeletal:      Right lower leg: No edema.      Left lower leg: No edema.   Constitutional:       Appearance: She is well-developed.   Pulmonary:      Effort: Pulmonary effort is normal. No accessory muscle usage, respiratory distress or accessory muscle usage.      Breath sounds: No stridor. Examination of the left-upper field reveals decreased breath sounds. Examination of the left-middle field reveals decreased breath sounds. Decreased breath sounds present.   Secretions are normal.Psychiatric:         Mood and Affect: Affect is angry.        Diagnostic Studies        Lab Results: I have reviewed the following results:      Medications:  Scheduled PRN   atorvastatin, 40 mg, Daily With Dinner  benzonatate, 100 mg, TID  budesonide, 0.5 mg, Q12H  chlorhexidine, 15 mL, Q12H MISTY  enoxaparin, 40 mg, Daily  formoterol, 20 mcg, Q12H  Hydrocod Octavio-Chlorphe Octavio ER, 5 mL, Q12H  insulin lispro, 1-5 Units, TID AC  ipratropium, 0.5 mg, Q6H  levalbuterol, 1.25 mg, Q6H      acetaminophen, 1,000 mg, Q8H PRN  acetaminophen, 650 mg, Q6H PRN  albuterol, 2 puff, Q6H PRN  HYDROmorphone, 0.2 mg, Q4H PRN       Continuous          Labs:   CBC    Recent Labs     04/28/25  1355 04/29/25  0506   WBC 10.04 11.60*   HGB 13.0 12.3   HCT 39.7 37.6    247     BMP    Recent Labs     04/28/25  1355 04/29/25  0506   SODIUM 138 137   K 3.9 4.1    100   CO2 30 29   AGAP 6 8   BUN 15 11   CREATININE 0.65 0.71   CALCIUM 9.5 8.7       Coags    No recent results     Additional Electrolytes  Recent Labs     04/28/25  1355 04/29/25  0506   MG 1.8* 2.0   PHOS  --  3.4          Blood Gas    No recent results  No recent results LFTs  Recent Labs     04/29/25  0506   ALT 15   AST 18   ALKPHOS 77   ALB 4.0   TBILI 0.47       Infectious  No recent results  Glucose  Recent Labs     04/28/25  1355 04/29/25  0506   GLUC 140 167*

## 2025-04-30 NOTE — PLAN OF CARE
Problem: PAIN - ADULT  Goal: Verbalizes/displays adequate comfort level or baseline comfort level  Description: Interventions:- Encourage patient to monitor pain and request assistance- Assess pain using appropriate pain scale- Administer analgesics based on type and severity of pain and evaluate response- Implement non-pharmacological measures as appropriate and evaluate response- Consider cultural and social influences on pain and pain management- Notify physician/advanced practitioner if interventions unsuccessful or patient reports new pain  Outcome: Progressing     Problem: INFECTION - ADULT  Goal: Absence or prevention of progression during hospitalization  Description: INTERVENTIONS:- Assess and monitor for signs and symptoms of infection- Monitor lab/diagnostic results- Monitor all insertion sites, i.e. indwelling lines, tubes, and drains- Monitor endotracheal if appropriate and nasal secretions for changes in amount and color- [place] appropriate cooling/warming therapies per order- Administer medications as ordered- Instruct and encourage patient and family to use good hand hygiene technique- Identify and instruct in appropriate isolation precautions for identified infection/condition  Outcome: Progressing     Problem: SAFETY ADULT  Goal: Patient will remain free of falls  Description: INTERVENTIONS:- Educate patient/family on patient safety including physical limitations- Instruct patient to call for assistance with activity - Consult OT/PT to assist with strengthening/mobility - Keep Call bell within reach- Keep bed low and locked with side rails adjusted as appropriate- Keep care items and personal belongings within reach- Initiate and maintain comfort rounds- Make Fall Risk Sign visible to staff- Offer Toileting every 2 Hours, in advance of need- Initiate/Maintain bed alarm- Obtain necessary fall risk management equipment: - Apply yellow socks and bracelet for high fall risk patients- Consider  moving patient to room near nurses station  Outcome: Progressing  Goal: Maintain or return to baseline ADL function  Description: INTERVENTIONS:-  Assess patient's ability to carry out ADLs; assess patient's baseline for ADL function and identify physical deficits which impact ability to perform ADLs (bathing, care of mouth/teeth, toileting, grooming, dressing, etc.)- Assess/evaluate cause of self-care deficits - Assess range of motion- Assess patient's mobility; develop plan if impaired- Assess patient's need for assistive devices and provide as appropriate- Encourage maximum independence but intervene and supervise when necessary- Involve family in performance of ADLs- Assess for home care needs following discharge - Consider OT consult to assist with ADL evaluation and planning for discharge- Provide patient education as appropriate  Outcome: Progressing  Goal: Maintains/Returns to pre admission functional level  Description: INTERVENTIONS:- Perform AM-PAC 6 Click Basic Mobility/ Daily Activity assessment daily.- Set and communicate daily mobility goal to care team and patient/family/caregiver. - Collaborate with rehabilitation services on mobility goals if consulted- Perform Range of Motion 3 times a day.- Reposition patient every 2 hours.- Dangle patient 3 times a day- Stand patient 3 times a day- Ambulate patient 3 times a day- Out of bed to chair 3 times a day - Out of bed for meals 3 times a day- Out of bed for toileting- Record patient progress and toleration of activity level   Outcome: Progressing     Problem: DISCHARGE PLANNING  Goal: Discharge to home or other facility with appropriate resources  Description: INTERVENTIONS:- Identify barriers to discharge w/patient and caregiver- Arrange for needed discharge resources and transportation as appropriate- Identify discharge learning needs (meds, wound care, etc.)- Arrange for interpretive services to assist at discharge as needed- Refer to Case Management  Department for coordinating discharge planning if the patient needs post-hospital services based on physician/advanced practitioner order or complex needs related to functional status, cognitive ability, or social support system  Outcome: Progressing     Problem: Knowledge Deficit  Goal: Patient/family/caregiver demonstrates understanding of disease process, treatment plan, medications, and discharge instructions  Description: Complete learning assessment and assess knowledge base.Interventions:- Provide teaching at level of understanding- Provide teaching via preferred learning methods  Outcome: Progressing

## 2025-04-30 NOTE — PLAN OF CARE
Problem: PAIN - ADULT  Goal: Verbalizes/displays adequate comfort level or baseline comfort level  Description: Interventions:- Encourage patient to monitor pain and request assistance- Assess pain using appropriate pain scale- Administer analgesics based on type and severity of pain and evaluate response- Implement non-pharmacological measures as appropriate and evaluate response- Consider cultural and social influences on pain and pain management- Notify physician/advanced practitioner if interventions unsuccessful or patient reports new pain  Outcome: Progressing     Problem: INFECTION - ADULT  Goal: Absence or prevention of progression during hospitalization  Description: INTERVENTIONS:- Assess and monitor for signs and symptoms of infection- Monitor lab/diagnostic results- Monitor all insertion sites, i.e. indwelling lines, tubes, and drains- Monitor endotracheal if appropriate and nasal secretions for changes in amount and color- [place] appropriate cooling/warming therapies per order- Administer medications as ordered- Instruct and encourage patient and family to use good hand hygiene technique- Identify and instruct in appropriate isolation precautions for identified infection/condition  Outcome: Progressing     Problem: SAFETY ADULT  Goal: Patient will remain free of falls  Description: INTERVENTIONS:- Educate patient/family on patient safety including physical limitations- Instruct patient to call for assistance with activity - Consult OT/PT to assist with strengthening/mobility - Keep Call bell within reach- Keep bed low and locked with side rails adjusted as appropriate- Keep care items and personal belongings within reach- Initiate and maintain comfort rounds- Make Fall Risk Sign visible to staff- Offer Toileting every 2 Hours, in advance of need- Initiate/Maintain bed alarm- Obtain necessary fall risk management equipment: - Apply yellow socks and bracelet for high fall risk patients- Consider  moving patient to room near nurses station  Outcome: Progressing  Goal: Maintain or return to baseline ADL function  Description: INTERVENTIONS:-  Assess patient's ability to carry out ADLs; assess patient's baseline for ADL function and identify physical deficits which impact ability to perform ADLs (bathing, care of mouth/teeth, toileting, grooming, dressing, etc.)- Assess/evaluate cause of self-care deficits - Assess range of motion- Assess patient's mobility; develop plan if impaired- Assess patient's need for assistive devices and provide as appropriate- Encourage maximum independence but intervene and supervise when necessary- Involve family in performance of ADLs- Assess for home care needs following discharge - Consider OT consult to assist with ADL evaluation and planning for discharge- Provide patient education as appropriate  Outcome: Progressing  Goal: Maintains/Returns to pre admission functional level  Description: INTERVENTIONS:- Perform AM-PAC 6 Click Basic Mobility/ Daily Activity assessment daily.- Set and communicate daily mobility goal to care team and patient/family/caregiver. - Collaborate with rehabilitation services on mobility goals if consulted- Perform Range of Motion 3 times a day.- Reposition patient every 2 hours.- Dangle patient 3 times a day- Stand patient 3 times a day- Ambulate patient 3 times a day- Out of bed to chair 3 times a day - Out of bed for meals 3 times a day- Out of bed for toileting- Record patient progress and toleration of activity level   Outcome: Progressing     Problem: DISCHARGE PLANNING  Goal: Discharge to home or other facility with appropriate resources  Description: INTERVENTIONS:- Identify barriers to discharge w/patient and caregiver- Arrange for needed discharge resources and transportation as appropriate- Identify discharge learning needs (meds, wound care, etc.)- Arrange for interpretive services to assist at discharge as needed- Refer to Case Management  Department for coordinating discharge planning if the patient needs post-hospital services based on physician/advanced practitioner order or complex needs related to functional status, cognitive ability, or social support system  Outcome: Progressing     Problem: Knowledge Deficit  Goal: Patient/family/caregiver demonstrates understanding of disease process, treatment plan, medications, and discharge instructions  Description: Complete learning assessment and assess knowledge base.Interventions:- Provide teaching at level of understanding- Provide teaching via preferred learning methods  Outcome: Progressing

## 2025-04-30 NOTE — CASE MANAGEMENT
Case Management Assessment & Discharge Planning Note    Patient name Susanna Guevara  [facility] MRN [id number]  : 1950 Date 2025       Current Admission Date: 2025  Current Admission Diagnosis:COPD, severe (HCC)   Patient Active Problem List    Diagnosis Date Noted Date Diagnosed    Chronic hypoxic respiratory failure (HCC) 2024     BMI 28.0-28.9,adult 2020     Chronic tachycardia 10/17/2019     Tobacco abuse, in remission 2019     Musculoskeletal neck pain 2019     Need for pneumococcal vaccination 2019     Lung nodule 2017     Dyspnea 2017     COPD, severe (HCC) 2017     Type 2 diabetes mellitus without complication, without long-term current use of insulin (HCC) 2017     HLD (hyperlipidemia) 2017       LOS (days): 2  Geometric Mean LOS (GMLOS) (days):   Days to GMLOS:     OBJECTIVE:    Risk of Unplanned Readmission Score: 5.67         Current admission status: Inpatient       Preferred Pharmacy:   [facility]/pharmacy #[id number] - [place] - [address]  [address]  [place] [address]  Phone: [phone] Fax: [phone]    [facility] - [place] - [address]  [address]  [place] [address]  Phone: [phone] Fax: [phone]    [facility] [place] - [address]  [address]  [place] [address]  Phone: [phone] Fax: [phone]    Primary Care Provider: Svetlana Klein MD    Primary Insurance: MEDICARE  Secondary Insurance: [employer]    ASSESSMENT:  Active Health Care Proxies    There are no active Health Care Proxies on file.                 Readmission Root Cause  30 Day Readmission: No    Patient Information  Admitted from:: Home  Mental Status: Alert  During Assessment patient was accompanied by: Spouse  Assessment information provided by:: Patient       DISCHARGE DETAILS:    Discharge  planning discussed with:: PATIENT  Freedom of Choice: Yes  Comments - Freedom of Choice: DISCUSSED FOC  CM contacted family/caregiver?: Yes  Were Treatment Team discharge recommendations reviewed with patient/caregiver?: Yes  Did patient/caregiver verbalize understanding of patient care needs?: Yes  Were patient/caregiver advised of the risks associated with not following Treatment Team discharge recommendations?: Yes    Contacts  Patient Contacts: Patient's spouse Santana  Relationship to Patient:: Family  Contact Method: In Person  Reason/Outcome: Discharge Planning, Emergency Contact       See therapy notes for PLOF and DME in home.    This CM met w/pt and spouse at bedside to introduce self & CM role. Confirmed demographics & PCP. Confirmed pt has home O2 through [employer]. Uses 3-6L at home. Pt and spouse have tanks & POC machine. Independent w/O2 needs.  Pt's spouse will bring portable O2 upon dc. CM to follow for dcp needs pending medical course.

## 2025-05-01 ENCOUNTER — APPOINTMENT (INPATIENT)
Dept: RADIOLOGY | Facility: HOSPITAL | Age: 75
DRG: 163 | End: 2025-05-01
Payer: MEDICARE

## 2025-05-01 VITALS
OXYGEN SATURATION: 97 % | HEART RATE: 101 BPM | WEIGHT: 148.15 LBS | SYSTOLIC BLOOD PRESSURE: 128 MMHG | RESPIRATION RATE: 24 BRPM | BODY MASS INDEX: 29.87 KG/M2 | DIASTOLIC BLOOD PRESSURE: 63 MMHG | TEMPERATURE: 98 F | HEIGHT: 59 IN

## 2025-05-01 LAB
GLUCOSE SERPL-MCNC: 151 MG/DL (ref 65–140)
GLUCOSE SERPL-MCNC: 159 MG/DL (ref 65–140)

## 2025-05-01 PROCEDURE — 97530 THERAPEUTIC ACTIVITIES: CPT

## 2025-05-01 PROCEDURE — 94640 AIRWAY INHALATION TREATMENT: CPT

## 2025-05-01 PROCEDURE — 97116 GAIT TRAINING THERAPY: CPT

## 2025-05-01 PROCEDURE — 71045 X-RAY EXAM CHEST 1 VIEW: CPT

## 2025-05-01 PROCEDURE — 94760 N-INVAS EAR/PLS OXIMETRY 1: CPT

## 2025-05-01 PROCEDURE — 99238 HOSP IP/OBS DSCHRG MGMT 30/<: CPT | Performed by: INTERNAL MEDICINE

## 2025-05-01 PROCEDURE — 82948 REAGENT STRIP/BLOOD GLUCOSE: CPT

## 2025-05-01 PROCEDURE — 97535 SELF CARE MNGMENT TRAINING: CPT

## 2025-05-01 PROCEDURE — 94664 DEMO&/EVAL PT USE INHALER: CPT

## 2025-05-01 PROCEDURE — NC001 PR NO CHARGE: Performed by: INTERNAL MEDICINE

## 2025-05-01 RX ORDER — HYDROCODONE POLISTIREX AND CHLORPHENIRAMINE POLISTIREX 10; 8 MG/5ML; MG/5ML
5 SUSPENSION, EXTENDED RELEASE ORAL EVERY 12 HOURS
Qty: 100 ML | Refills: 0 | Status: SHIPPED | OUTPATIENT
Start: 2025-05-01 | End: 2025-05-01

## 2025-05-01 RX ORDER — BENZONATATE 100 MG/1
100 CAPSULE ORAL 3 TIMES DAILY
Qty: 21 CAPSULE | Refills: 0 | Status: SHIPPED | OUTPATIENT
Start: 2025-05-01 | End: 2025-05-01

## 2025-05-01 RX ORDER — HYDROCODONE POLISTIREX AND CHLORPHENIRAMINE POLISTIREX 10; 8 MG/5ML; MG/5ML
5 SUSPENSION, EXTENDED RELEASE ORAL EVERY 12 HOURS
Qty: 100 ML | Refills: 0 | Status: SHIPPED | OUTPATIENT
Start: 2025-05-01 | End: 2025-05-11

## 2025-05-01 RX ORDER — BENZONATATE 100 MG/1
100 CAPSULE ORAL 3 TIMES DAILY
Qty: 21 CAPSULE | Refills: 0 | Status: SHIPPED | OUTPATIENT
Start: 2025-05-01 | End: 2025-05-08

## 2025-05-01 RX ADMIN — INSULIN LISPRO 1 UNITS: 100 INJECTION, SOLUTION INTRAVENOUS; SUBCUTANEOUS at 11:59

## 2025-05-01 RX ADMIN — BENZONATATE 100 MG: 100 CAPSULE ORAL at 08:01

## 2025-05-01 RX ADMIN — BUDESONIDE 0.5 MG: 0.5 INHALANT RESPIRATORY (INHALATION) at 07:11

## 2025-05-01 RX ADMIN — ENOXAPARIN SODIUM 40 MG: 40 INJECTION SUBCUTANEOUS at 08:00

## 2025-05-01 RX ADMIN — IPRATROPIUM BROMIDE 0.5 MG: 0.5 SOLUTION RESPIRATORY (INHALATION) at 07:09

## 2025-05-01 RX ADMIN — LEVALBUTEROL HYDROCHLORIDE 1.25 MG: 1.25 SOLUTION RESPIRATORY (INHALATION) at 07:09

## 2025-05-01 RX ADMIN — INSULIN LISPRO 1 UNITS: 100 INJECTION, SOLUTION INTRAVENOUS; SUBCUTANEOUS at 08:03

## 2025-05-01 RX ADMIN — HYDROCODONE POLISTIREX AND CHLORPHENIRAMINE POLISTIREX 5 ML: 10; 8 SUSPENSION, EXTENDED RELEASE ORAL at 08:00

## 2025-05-01 RX ADMIN — ALBUTEROL SULFATE 2 PUFF: 90 AEROSOL, METERED RESPIRATORY (INHALATION) at 04:11

## 2025-05-01 RX ADMIN — FORMOTEROL FUMARATE DIHYDRATE 20 MCG: 20 SOLUTION RESPIRATORY (INHALATION) at 07:11

## 2025-05-01 RX ADMIN — PRAMIPEXOLE DIHYDROCHLORIDE 0.25 MG: 0.25 TABLET ORAL at 08:34

## 2025-05-01 NOTE — PLAN OF CARE
Problem: PAIN - ADULT  Goal: Verbalizes/displays adequate comfort level or baseline comfort level  Description: Interventions:- Encourage patient to monitor pain and request assistance- Assess pain using appropriate pain scale- Administer analgesics based on type and severity of pain and evaluate response- Implement non-pharmacological measures as appropriate and evaluate response- Consider cultural and social influences on pain and pain management- Notify physician/advanced practitioner if interventions unsuccessful or patient reports new pain  Outcome: Progressing     Problem: INFECTION - ADULT  Goal: Absence or prevention of progression during hospitalization  Description: INTERVENTIONS:- Assess and monitor for signs and symptoms of infection- Monitor lab/diagnostic results- Monitor all insertion sites, i.e. indwelling lines, tubes, and drains- Monitor endotracheal if appropriate and nasal secretions for changes in amount and color- [place] appropriate cooling/warming therapies per order- Administer medications as ordered- Instruct and encourage patient and family to use good hand hygiene technique- Identify and instruct in appropriate isolation precautions for identified infection/condition  Outcome: Progressing     Problem: SAFETY ADULT  Goal: Patient will remain free of falls  Description: INTERVENTIONS:- Educate patient/family on patient safety including physical limitations- Instruct patient to call for assistance with activity - Consult OT/PT to assist with strengthening/mobility - Keep Call bell within reach- Keep bed low and locked with side rails adjusted as appropriate- Keep care items and personal belongings within reach- Initiate and maintain comfort rounds- Make Fall Risk Sign visible to staff- Offer Toileting every 2 Hours, in advance of need- Initiate/Maintain bed alarm- Obtain necessary fall risk management equipment: - Apply yellow socks and bracelet for high fall risk patients- Consider  moving patient to room near nurses station  Outcome: Progressing  Goal: Maintain or return to baseline ADL function  Description: INTERVENTIONS:-  Assess patient's ability to carry out ADLs; assess patient's baseline for ADL function and identify physical deficits which impact ability to perform ADLs (bathing, care of mouth/teeth, toileting, grooming, dressing, etc.)- Assess/evaluate cause of self-care deficits - Assess range of motion- Assess patient's mobility; develop plan if impaired- Assess patient's need for assistive devices and provide as appropriate- Encourage maximum independence but intervene and supervise when necessary- Involve family in performance of ADLs- Assess for home care needs following discharge - Consider OT consult to assist with ADL evaluation and planning for discharge- Provide patient education as appropriate  Outcome: Progressing  Goal: Maintains/Returns to pre admission functional level  Description: INTERVENTIONS:- Perform AM-PAC 6 Click Basic Mobility/ Daily Activity assessment daily.- Set and communicate daily mobility goal to care team and patient/family/caregiver. - Collaborate with rehabilitation services on mobility goals if consulted- Perform Range of Motion 3 times a day.- Reposition patient every 2 hours.- Dangle patient 3 times a day- Stand patient 3 times a day- Ambulate patient 3 times a day- Out of bed to chair 3 times a day - Out of bed for meals 3 times a day- Out of bed for toileting- Record patient progress and toleration of activity level   Outcome: Progressing     Problem: Knowledge Deficit  Goal: Patient/family/caregiver demonstrates understanding of disease process, treatment plan, medications, and discharge instructions  Description: Complete learning assessment and assess knowledge base.Interventions:- Provide teaching at level of understanding- Provide teaching via preferred learning methods  Outcome: Progressing

## 2025-05-01 NOTE — DISCHARGE SUMMARY
Discharge Summary - Critical Care/ICU   Name: Susanna Guevara 74 y.o. female I MRN: [id number]  Unit/Bed#: MICU 11 I Date of Admission: 4/28/2025   Date of Service: 5/1/2025 I Hospital Day: 3    Admission Date: 4/28/2025 1322  Discharge Date: 05/01/25  Admitting Diagnosis: COPD, severe (HCC) [J44.9]  Discharge Diagnosis:   Medical Problems       Resolved Problems  Date Reviewed: 4/28/2025   None         HPI: 74-year-old woman with past medical history of severe COPD, chronic hypoxic respiratory failure on 3 to 6 L at baseline, diabetes, and CAD presented to [facility] on 4/28 for elective BLVR, with 2 [place] valves placed into the left upper lobe/lingula.    Procedures Performed:   Orders Placed This Encounter   Procedures    POC Lung US       Summary of Hospital Course: Patient was admitted to the medical ICU postoperatively on 4/28, she received her 2-hour, 4-hour, 8-hour, 24-hour, POD 2 and POD 3 postoperative x-rays per protocol.  Patient had several coughing spells, which were responsive to Tessalon Perles, Tussionex, and received 1 treatment of nebulized lidocaine.  Following POD 3 chest x-ray showing appropriate postoperative findings, patient was deemed stable for discharge on 5/1.  Patient was provided with appropriate postoperative instructions, counseled on symptoms that would warrant her to return to the hospital, and given 7 days of Tussionex and Tessalon Perles, in addition to outpatient prescription for CT chest.    Significant Findings, Care, Treatment and Services Provided: BLVR, with 2 [place] valves placed into the left upper lobe/lingula.    Complications: None    Condition at Discharge: fair       Discharge instructions/Information to patient and family:   See After Visit Summary (AVS) for information provided to patient and family.      Provisions for Follow-Up Care:  See after visit summary for information related to follow-up care and any pertinent home health orders.      PCP: Svetlana Klein  MD    Disposition: See After Visit Summary for discharge disposition information.    Planned Readmission: No     Discharge Medications:  See after visit summary for reconciled discharge medications provided to patient and family.      Discharge Statement:  I have spent a total time of 20 minutes in caring for this patient on the day of the visit/encounter. .

## 2025-05-01 NOTE — PLAN OF CARE
Problem: OCCUPATIONAL THERAPY ADULT  Goal: Performs self-care activities at highest level of function for planned discharge setting.  See evaluation for individualized goals.  Description: Treatment Interventions: ADL retraining, Functional transfer training, Endurance training, Cognitive reorientation, Patient/family training, Equipment evaluation/education, Compensatory technique education, Continued evaluation, Energy conservation, Activityengagement          See flowsheet documentation for full assessment, interventions and recommendations.   Outcome: Progressing  Note: Limitation: Decreased ADL status, Decreased Safe judgement during ADL, Decreased endurance, Decreased high-level ADLs, Decreased self-care trans  Prognosis: Fair  Assessment: Patient participated in Skilled OT session 5/1/2025 with interventions consisting of ADL re training with the use of correct body mechnaics, Energy Conservation techniques, deep breathing technique, safety awareness and fall prevention techniques, and increase OOB/ sitting tolerance . Patient agreeable to OT treatment session, upon arrival patient was found supine in bed.  In comparison to previous session, patient with improvements in LB ADLS. Patient requiring verbal cues for pacing thru activity steps. Pt not receptive to therapy recommendations and continually stating she's going home and she's extremely tired and no one is helping her. Patient continues to be functioning below baseline level, occupational performance remains limited secondary to factors listed above and increased risk for falls and injury.   From OT standpoint, recommendation at time of d/c would be minimum resource intensity.   Patient to benefit from continued Occupational Therapy treatment while in the hospital to address deficits as defined above and maximize level of functional independence with ADLs and functional mobility.     Rehab Resource Intensity Level, OT: III (Minimum Resource  Intensity)        Anne Marie Turk MS, OTR/L

## 2025-05-01 NOTE — OCCUPATIONAL THERAPY NOTE
Occupational Therapy Treatment Note      Susanna Guevara    5/1/2025    Principal Problem:    COPD, severe (HCC)  Active Problems:    Type 2 diabetes mellitus without complication, without long-term current use of insulin (HCC)    HLD (hyperlipidemia)    Lung nodule    Tobacco abuse, in remission    Chronic hypoxic respiratory failure (HCC)      Past Medical History:   Diagnosis Date    COPD (chronic obstructive pulmonary disease) (HCC)     Diabetes mellitus (HCC)     Hyperlipidemia     Pulmonary nodule 8/31/2017       Past Surgical History:   Procedure Laterality Date    CARDIAC CATHETERIZATION N/A 3/13/2025    Procedure: Cardiac RHC;  Surgeon: Ramsey Turpin MD;  [facility]: [facility];  Service: Cardiology    HAND SURGERY      TENDON TRANSFER      Left lower arm        05/01/25 1200   OT Last Visit   OT Visit Date 05/01/25   Note Type   Note Type Treatment   Pain Assessment   Pain Assessment Tool 0-10   Pain Score No Pain   Restrictions/Precautions   Weight Bearing Precautions Per Order No   Other Precautions Bed Alarm;Telemetry;Multiple lines;O2;Fall Risk;Pain  (3L O2)   Lifestyle   Autonomy I w/ ADLS, shares IADLS w/ spouse, not driving recently 2/2 coughing, I w/ transfers and functional mobility PTA   Reciprocal Relationships Pt lives w/ her spouse   Service to Others retired   Intrinsic Gratification staying busy with household chores   ADL   LB Dressing Assistance 5  Supervision/Setup   LB Dressing Deficit Thread LLE into pants;Thread RLE into pants;Pull up over hips   LB Dressing Comments pt donned pants while seated EOB w/ setup   Bed Mobility   Supine to Sit 5  Supervision   Additional items HOB elevated;Verbal cues   Sit to Supine Unable to assess   Additional Comments pt sat EOB w/ Fair sitting balance/trunk control   Transfers   Sit to Stand 5  Supervision   Additional items Increased time required;Verbal cues   Stand to Sit 5  Supervision   Additional items Increased time required;Verbal cues  "  Additional Comments no AD/DME used   Functional Mobility   Functional Mobility 5  Supervision   Additional Comments pt engaged in short household distance functional mobility w/ S; no AD/DME used. VC for pacing/activity tolerance. O2 sat dropped to 81% w/ increased activity. Maintained around 88-89% w standing rest breaks.   Subjective   Subjective \"I don't wanna do this, I wanna go home\"   Cognition   Overall Cognitive Status WFL   Arousal/Participation Responsive   Attention Attends with cues to redirect   Orientation Level Oriented X4   Memory Decreased recall of precautions   Following Commands Follows one step commands without difficulty   Comments pt is cooperative but irritable. C/O about frustration of being in the hospital and wanting to just go home; not overly receptive to therapy recommendations/feedback regarding pacing/energy conservation techniques.   Activity Tolerance   Activity Tolerance Patient limited by fatigue   Medical Staff Made Aware PT, RN, CM   Assessment   Assessment Patient participated in Skilled OT session 5/1/2025 with interventions consisting of ADL re training with the use of correct body mechnaics, Energy Conservation techniques, deep breathing technique, safety awareness and fall prevention techniques, and increase OOB/ sitting tolerance . Patient agreeable to OT treatment session, upon arrival patient was found supine in bed.  In comparison to previous session, patient with improvements in LB ADLS. Patient requiring verbal cues for pacing thru activity steps. Pt not receptive to therapy recommendations and continually stating she's going home and she's extremely tired and no one is helping her. Patient continues to be functioning below baseline level, occupational performance remains limited secondary to factors listed above and increased risk for falls and injury.   From OT standpoint, recommendation at time of d/c would be minimum resource intensity.   Patient to benefit from " continued Occupational Therapy treatment while in the hospital to address deficits as defined above and maximize level of functional independence with ADLs and functional mobility.   Plan   Treatment Interventions ADL retraining;Functional transfer training;Endurance training;Cognitive reorientation;Patient/family training;Equipment evaluation/education;Compensatory technique education;Continued evaluation;Energy conservation;Activityengagement   Goal Expiration Date 05/13/25   OT Treatment Day 1   OT Frequency 2-3x/wk   Discharge Recommendation   Rehab Resource Intensity Level, OT III (Minimum Resource Intensity)   Additional Comments  The patient's raw score on the -PAC Daily Activity Inpatient Short Form is 21. A raw score of greater than or equal to 19 suggests the patient may benefit from discharge to home. Please refer to the recommendation of the Occupational Therapist for safe discharge planning.   AM-PAC Daily Activity Inpatient   Lower Body Dressing 3   Bathing 3   Toileting 3   Upper Body Dressing 4   Grooming 4   Eating 4   Daily Activity Raw Score 21   Daily Activity Standardized Score (Calc for Raw Score >=11) 44.27   AM-PAC Applied Cognition Inpatient   Following a Speech/Presentation 3   Understanding Ordinary Conversation 4   Taking Medications 4   Remembering Where Things Are Placed or Put Away 4   Remembering List of 4-5 Errands 4   Taking Care of Complicated Tasks 3   Applied Cognition Raw Score 22   Applied Cognition Standardized Score 47.83   End of Consult   Education Provided Yes   Patient Position at End of Consult Bedside chair;All needs within reach   Nurse Communication Nurse aware of consult     Anne Marie Turk MS, OTR/L

## 2025-05-01 NOTE — PROGRESS NOTES
Progress Note - Critical Care/ICU   Name: Susanna Guevara 74 y.o. female I MRN: [id number]  Unit/Bed#: MICU 11 I Date of Admission: 2025   Date of Service: 2025 I Hospital Day: 3       Critical Care Attending Note    74 year old woman with Severe COPD (FEV1 28-43%) on Trelegy 100mcg, chronic hypoxic respiratory failure (3L/min NC with 6L/min exertion), CAD, DLP, DM2 presented for BLVR. She underwent 2 [place] valves placed into SHIN/L[place]     24hr events - feeling improved, eager to go home, no chest pain, no sputum production, improving cough    VS AF, HR variable 's, MAPS 's, SpO2 97% 2LNC at rest  Exam  GEN older woman in bed, nontoxic   HEENT MMM no thrush  NECK no accessory muscle use   CV reg, intermittently tachy, single s1/2, no m/r  Pulm diminished BS diffusely, no wheeze or rales  EXT warm, brisk cap refill, no edema    Laboratory and Diagnostics  Results from last 7 days   Lab Units 25  0911 25  0506 25  1355   WBC Thousand/uL 7.89 11.60* 10.04   HEMOGLOBIN g/dL 12.1 12.3 13.0   HEMATOCRIT % 37.7 37.6 39.7   PLATELETS Thousands/uL 224 247 257   SEGS PCT % 56  --  52   MONO PCT % 6  --  8   EOS PCT % 4  --  2     Results from last 7 days   Lab Units 25  0911 25  0506 25  1355   SODIUM mmol/L 135 137 138   POTASSIUM mmol/L 3.7 4.1 3.9   CHLORIDE mmol/L 98 100 102   CO2 mmol/L 31 29 30   ANION GAP mmol/L 6 8 6   BUN mg/dL 7 11 15   CREATININE mg/dL 0.67 0.71 0.65   CALCIUM mg/dL 9.0 8.7 9.5   GLUCOSE RANDOM mg/dL 241* 167* 140   ALT U/L  --  15  --    AST U/L  --  18  --    ALK PHOS U/L  --  77  --    ALBUMIN g/dL  --  4.0  --    TOTAL BILIRUBIN mg/dL  --  0.47  --      Results from last 7 days   Lab Units 25  0506 25  1355   MAGNESIUM mg/dL 2.0 1.8*   PHOSPHORUS mg/dL 3.4  --       AB2025 - 7.42     MICRO  SHIN [place]  - NGTD, neg AFB     RADIOGRAPHS - New images and reports personally reviewed  CXR  - POD3 - SHIN volume  loss, no PTX    CXR 4/30 - POD2 - SHIN volume loss, elevation HD, no PTX     CXR 4/29 - POD1 film - early SHIN volume loss, no PTX appreciated     CXR 4/28 - in OR - no PTX appreciated     RHC 3/2025  RA 9  RV 30/9  PA 30/16, 21  PCWP 12  TPG 9  DPG 4  PA sat 72.6%  Yi CO/CI: 4.6/2.8  PVR 2.0     TTE 2/2025 - EF 60%, mod dilated RV, RVSP 26mmHg     [place]/DLCO 3/20/2025 - Ratio 64%, FVC 35%, FEV1 28%, DLCO 49%     PFTs 1/31/2025 - Ratio 48%, FVC 60%, FEV1 36%, post BD FEV1 43% - 20% change, %, %, unable to perform DLCO      Assessment  Severe COPD post BLVR SHIN x 2 valves  Acute/chronic hypoxic respiratory failure  Chronic mild RV dilation with mean PAP 21mmHG  DM2  Post bronchoscopy cough  Reported allergy prednisone/methylprednisone  Nicotine dependence in remission     PLAN  Standard post BLVR CXR images   PTX cart at bedside while admitted  Continue xopenex/atrovent nebs - reduce to TID at her reqest, pulmicort/perforomist nebs BID - at home  7 days prn tessalon and tussionex  Titrate O2 to keep SpO2 90-94% - monitor needs with ambulation  ISS for now  SCDs/LMWH  Stable for d/c home, discussed with IP team, follow up in 1 week  Strict post BLVR home precautions as well as return [facility]  Follow up instructions on D/C summary    I have personally provided 0 minutes of critical care time, exclusive of procedures, teaching, and any prior time recorded by providers other than myself. Time includes review of laboratory data, radiology results, discussion with consultants, and monitoring for potential decompensation.    Alfa Brown, DO

## 2025-05-01 NOTE — PHYSICAL THERAPY NOTE
"       PHYSICAL THERAPY TREATMENT  NAME:  Susanna Guevara  DATE: 05/01/25    AGE:   74 y.o.  Mrn:   [id number]  ADMIT DX:  COPD, severe (HCC) [J44.9]    Past Medical History:   Diagnosis Date    COPD (chronic obstructive pulmonary disease) (HCC)     Diabetes mellitus (HCC)     Hyperlipidemia     Pulmonary nodule 8/31/2017     Past Surgical History:   Procedure Laterality Date    CARDIAC CATHETERIZATION N/A 3/13/2025    Procedure: Cardiac RHC;  Surgeon: Ramsey Turpin MD;  Location: [facility];  Service: Cardiology    HAND SURGERY      TENDON TRANSFER      Left lower arm       Length Of Stay: 3     05/01/25 1154   PT Last Visit   PT Visit Date 05/01/25   Note Type   Note Type Treatment   Pain Assessment   Pain Assessment Tool 0-10   Pain Score No Pain   Restrictions/Precautions   Weight Bearing Precautions Per Order No   Other Precautions Impulsive;Chair Alarm;Bed Alarm;Multiple lines;O2   General   Chart Reviewed Yes   Family/Caregiver Present No   Cognition   Arousal/Participation Uncooperative  (willing to participate w/ encouragement)   Attention Attends with cues to redirect   Orientation Level Oriented X4   Memory Decreased recall of precautions   Following Commands Follows one step commands without difficulty   Comments cues for safety/ pacing and PLB techniques- limited carryover.   Subjective   Subjective \"Im going home. I dont want to do this.\"- pt ultimately agreeable to particiapte w/ encouragement . pt is refusing rehab and will only agree to home d/c w/ sposue.   Bed Mobility   Supine to Sit 5  Supervision   Additional items HOB elevated;Impulsive   Transfers   Sit to Stand 5  Supervision   Additional items Impulsive;Verbal cues   Stand to Sit 5  Supervision   Additional items Impulsive;Verbal cues   Additional Comments no AD   Ambulation/Elevation   Gait pattern Excessively slow   Gait Assistance 5  Supervision   Assistive Device None   Distance 30+40+40+40+30 on 3Lo2- standing rest breaks " "initiated by PT for drop in Spo2 to 82%- slow to recover to 88-90% w/ standing rest and PLB techniques.   Stair Management Assistance   (pt refusing trial w/ PT)   Balance   Static Sitting Fair +   Dynamic Sitting Fair   Static Standing Fair -   Dynamic Standing Fair -   Ambulatory Fair -   Endurance Deficit   Endurance Deficit Yes   Endurance Deficit Description hypoxia to low 80's% on 3Lo2 w/ ambualtion- pt educated on pacing and taking appropriate rest w/ limited carryover   Activity Tolerance   Activity Tolerance Patient limited by fatigue;Other (Comment)  (self limiting)   Medical Staff Made Aware Co- tx was performed w/ OT today. This pt's participation in skilled therapies requires skilled Ax2 2*medical complexity; decreased activity tolerance; pain; limited endurance and for safety. For these reasons co- tx was indicated to ensure patient could safely and optimally participate in therapy services and maximize their rehabilitation during treatment time.  PT and OT disciplines addressed separate goals of patient's individualized care plans throughout session.   Nurse Made Aware yes- Lionel   Exercises   Balance training  STS- function w/o UE support standing. room negotiation   Assessment   Prognosis Fair   Problem List Decreased endurance;Impaired balance;Decreased mobility   Assessment Pt seen for PT session as above. Pt w/ drop in spo2 to low 80's on 3Lo2 w/ minimal activity. Pt required cues by therapist for pacing and taking appropriate rest breaks for O2 recovery. Pt reports she will be \"fine\" states she \"is going home today\" - refusing rehab on d/c and stair trial. Pt balance was improved today and was able to ambulate w/o HHA or use of AD today w/o overt LOB.   Pt reports supportive spouse who will transport and assist on d/c. Education was provided on safety and pacing w/ limited carryover. PT will continue to follow and progress while inpt.   Goals   Patient Goals go home today   STG Expiration Date " 05/11/25   PT Treatment Day 1   Plan   Treatment/Interventions ADL retraining;Functional transfer training;LE strengthening/ROM;Elevations;Therapeutic exercise;Endurance training;Cognitive reorientation;Patient/family training;Equipment eval/education;Bed mobility;Gait training;Compensatory technique education;Spoke to nursing;Spoke to case management;Spoke to advanced practitioner;OT   Progress Progressing toward goals   PT Frequency 3-5x/wk   Discharge Recommendation   Rehab Resource Intensity Level, PT II (Moderate Resource Intensity)   AM-PAC Basic Mobility Inpatient   Turning in Flat Bed Without Bedrails 4   Lying on Back to Sitting on Edge of Flat Bed Without Bedrails 4   Moving Bed to Chair 3   Standing Up From Chair Using Arms 3   Walk in Room 3   Climb 3-5 Stairs With Railing 2   Basic Mobility Inpatient Raw Score 19   Basic Mobility Standardized Score 42.48   [facility] Highest Level Of Mobility   -HLM Goal 6: Walk 10 steps or more   JH-HLM Achieved 7: Walk 25 feet or more   Education   Education Provided Mobility training;Other   Patient Reinforcement needed   End of Consult   Patient Position at End of Consult Bedside chair;Bed/Chair alarm activated;All needs within reach       The patient's AM-PAC Basic Mobility Inpatient Short Form Raw Score is 19. A Raw score of greater than 16 suggests the patient may benefit from discharge to home. Please also refer to the recommendation of the Physical Therapist for safe discharge planning.            Olive Verma, PT

## 2025-05-01 NOTE — CASE MANAGEMENT
Case Management Discharge Planning Note    Patient name Susanna Guevara  [facility] [id number]  : 1950 Date 2025       Current Admission Date: 2025  Current Admission Diagnosis:COPD, severe (HCC)   Patient Active Problem List    Diagnosis Date Noted Date Diagnosed    Chronic hypoxic respiratory failure (HCC) 2024     BMI 28.0-28.9,adult 2020     Chronic tachycardia 10/17/2019     Tobacco abuse, in remission 2019     Musculoskeletal neck pain 2019     Need for pneumococcal vaccination 2019     Lung nodule 2017     Dyspnea 2017     COPD, severe (HCC) 2017     Type 2 diabetes mellitus without complication, without long-term current use of insulin (HCC) 2017     HLD (hyperlipidemia) 2017       LOS (days): 3  Geometric Mean LOS (GMLOS) (days):   Days to GMLOS:     OBJECTIVE:  Risk of Unplanned Readmission Score: 5.75         Current admission status: Inpatient   Preferred Pharmacy:   [facility]/pharmacy #[id number] - [place] - [address]  [address]  [place] [address]  Phone: [phone] Fax: [phone]    [facility] [place] - [address]  [address]  [place] [address]  Phone: [phone] Fax: [phone]    [facility] - [place] - [address]  [address]  [place] [address]  Phone: [phone] Fax: [phone]    Primary Care Provider: Svetlana Klein MD    Primary Insurance: MEDICARE  Secondary Insurance: [employer] [place]    DISCHARGE DETAILS:    Discharge planning discussed with:: Patient  Freedom of Choice: Yes  Comments - Freedom of Choice: Discussed FOC  CM contacted family/caregiver?: No- see comments (Declined)         CM met w/pt at bedside to review dcp. Pt stated she was going home today.  CM discussed STR vs home w/HH. Pt declined STR & HH. Stated her  would be in to pick her up and  he will bring her portable O2 tank.  CM sent message to provider to confirm dc today - awaiting response.       IMM Given (Date):: 05/01/25  IMM Given to:: Patient    This CM reviewed IMM w/pt at bedside - pt verbalized understanding and in agreement w/dcp. Pt signed IMM form & CM placed in MR bin.

## 2025-05-01 NOTE — PROGRESS NOTES
Progress Note - Critical Care/ICU   Name: Susanna Guevara 74 y.o. female I MRN: [id number]  Unit/Bed#: MICU 11 I Date of Admission: 4/28/2025   Date of Service: 5/1/2025 I Hospital Day: 3       Assessment & Plan   Active Hospital Problems    Diagnosis Date Noted POA    COPD, severe (HCC) 08/28/2017 Yes     Chronic    Chronic hypoxic respiratory failure (HCC) 08/22/2024 Yes    Lung nodule 08/31/2017 Yes    Tobacco abuse, in remission 05/24/2019 Yes    HLD (hyperlipidemia) 08/28/2017 Yes    Type 2 diabetes mellitus without complication, without long-term current use of insulin (HCC) 08/28/2017 Yes      Resolved Hospital Problems   No resolved problems to display.       Neuro:   No active issues      CV:   Sinus Tachycardia  F/U CBC  Patient persistently in sinus tachy     HLD  Continue home lipitor w diet      Pulm:  Diagnosis: Severe COPD, Chronic hypoxic respiratory failure, POD 1 Bronchoscopic Lung Valve Reduction, previous smoker w 47 pack years (quit 8 years ago)  CHRF:  Likely due to COPD  Requires 3 - 6L at home      Severe COPD POD 3 BLVR   Continued to be dyspneic despite maximal medical therapy with ICS/LABA/LAMA  Home regimen: Trelegy Ellipta, Xopenex nebulized solution, Albuterol rescue inhaler   CXR 1 hr, 4 hr, 8 hr, 24 hr postoperatively, POD 2, POD 3   Antitussives: Tessalon perles TID, Tussionex q12  Nebulized Treatments: Xopenex/Atrovent, inhaled lidocaine, Perforomist, Pulmicort   CT chest without contrast to verify valve placement  GI:   Monitor BM postoperatively  Start bowel regimen with senna and miralax      :   Cr at baseline, Ur output adequate      F/E/N:   F euvolemic  E replete prn  N tolerating regular diet      Heme/Onc: Leukocytosis   Leukocytosis:  Will order CBC to monitor   Lovenox for DVT ppx      Endo:   Diabeters, last A1c was 7.1  On Metformin 500 at home   SSI     ID:    No active issues      MSK/Skin:   None  Disposition: Possibly home     ICU Core Measures     A: Assess,  Prevent, and Manage Pain Has pain been assessed? Yes  Need for changes to pain regimen? No   B: Both SAT/SAT  N/A   C: Choice of Sedation RASS Goal: 0 Alert and Calm  Need for changes to sedation or analgesia regimen? No   D: Delirium CAM-ICU: Negative   E: Early Mobility  Plan for early mobility? Yes   F: Family Engagement Plan for family engagement today? Yes         Prophylaxis:  VTE VTE covered by:  enoxaparin, Subcutaneous, 40 mg at 04/30/25 0820       Stress Ulcer  not ordered         24 Hour Events :   No overnight events  Subjective       Objective :                   Vitals I/O      Most Recent Min/Max in 24hrs   Temp 98 °F (36.7 °C) Temp  Min: 98 °F (36.7 °C)  Max: 98.3 °F (36.8 °C)   Pulse 101 Pulse  Min: 96  Max: 128   Resp (!) 24 Resp  Min: 21  Max: 53   /63 BP  Min: 110/58  Max: 166/81   O2 Sat 97 % SpO2  Min: 94 %  Max: 100 %      Intake/Output Summary (Last 24 hours) at 5/1/2025 0724  Last data filed at 4/30/2025 1830  Gross per 24 hour   Intake 850 ml   Output --   Net 850 ml       Diet Jayy/CHO Controlled; Consistent Carbohydrate Diet Level 1 (4 carb servings/60 grams CHO/meal)    Invasive Monitoring           Physical Exam   Physical Exam  Cardiovascular:      Rate and Rhythm: Regular rhythm. Tachycardia present.      Pulses: Normal pulses.   Musculoskeletal:      Right lower leg: No edema.      Left lower leg: No edema.   Constitutional:       Appearance: She is well-developed.   Pulmonary:      Effort: Pulmonary effort is normal. No accessory muscle usage, respiratory distress or accessory muscle usage.      Breath sounds: No stridor. Examination of the left-middle field reveals decreased breath sounds. Examination of the left-lower field reveals decreased breath sounds. Decreased breath sounds present.   Secretions are normal.       Diagnostic Studies        Lab Results: I have reviewed the following results:     Medications:  Scheduled PRN   atorvastatin, 40 mg, Daily With  Dinner  benzonatate, 100 mg, TID  budesonide, 0.5 mg, Q12H  chlorhexidine, 15 mL, Q12H MISTY  enoxaparin, 40 mg, Daily  formoterol, 20 mcg, Q12H  Hydrocod Octavio-Chlorphe Octavio ER, 5 mL, Q12H  insulin lispro, 1-5 Units, TID AC  ipratropium, 0.5 mg, TID  levalbuterol, 1.25 mg, TID  pramipexole, 0.25 mg, TID  senna, 1 tablet, HS      acetaminophen, 650 mg, Q6H PRN  albuterol, 2 puff, Q6H PRN  HYDROmorphone, 0.2 mg, Q4H PRN       Continuous          Labs:   CBC    Recent Labs     04/30/25  0911   WBC 7.89   HGB 12.1   HCT 37.7        BMP    Recent Labs     04/30/25  0911   SODIUM 135   K 3.7   CL 98   CO2 31   AGAP 6   BUN 7   CREATININE 0.67   CALCIUM 9.0       Coags    No recent results     Additional Electrolytes  No recent results       Blood Gas    No recent results  No recent results LFTs  No recent results    Infectious  No recent results  Glucose  Recent Labs     04/30/25  0911   GLUC 241*

## 2025-05-05 ENCOUNTER — APPOINTMENT (OUTPATIENT)
Dept: PULMONOLOGY | Facility: CLINIC | Age: 75
End: 2025-05-05
Payer: MEDICARE

## 2025-05-05 LAB — FUNGUS SPEC CULT: NORMAL

## 2025-05-06 ENCOUNTER — OFFICE VISIT (OUTPATIENT)
Dept: PULMONOLOGY | Facility: MEDICAL CENTER | Age: 75
End: 2025-05-06
Payer: MEDICARE

## 2025-05-06 ENCOUNTER — HOSPITAL ENCOUNTER (OUTPATIENT)
Dept: RADIOLOGY | Facility: HOSPITAL | Age: 75
Discharge: HOME | End: 2025-05-06
Payer: MEDICARE

## 2025-05-06 VITALS
OXYGEN SATURATION: 97 % | RESPIRATION RATE: 12 BRPM | DIASTOLIC BLOOD PRESSURE: 64 MMHG | TEMPERATURE: 97.5 F | SYSTOLIC BLOOD PRESSURE: 122 MMHG | HEIGHT: 59 IN | HEART RATE: 110 BPM | WEIGHT: 138 LBS | BODY MASS INDEX: 27.82 KG/M2

## 2025-05-06 DIAGNOSIS — J44.9 COPD, SEVERE (HCC): Primary | Chronic | ICD-10-CM

## 2025-05-06 DIAGNOSIS — J44.9 COPD, SEVERE (HCC): Chronic | ICD-10-CM

## 2025-05-06 DIAGNOSIS — J96.11 CHRONIC HYPOXIC RESPIRATORY FAILURE (HCC): ICD-10-CM

## 2025-05-06 DIAGNOSIS — F17.201 TOBACCO ABUSE, IN REMISSION: ICD-10-CM

## 2025-05-06 DIAGNOSIS — R91.1 LUNG NODULE: ICD-10-CM

## 2025-05-06 LAB
MYCOBACTERIUM SPEC CULT: NORMAL
RHODAMINE-AURAMINE STN SPEC: NORMAL

## 2025-05-06 PROCEDURE — G2211 COMPLEX E/M VISIT ADD ON: HCPCS | Performed by: NURSE PRACTITIONER

## 2025-05-06 PROCEDURE — 99214 OFFICE O/P EST MOD 30 MIN: CPT | Performed by: NURSE PRACTITIONER

## 2025-05-06 PROCEDURE — 71046 X-RAY EXAM CHEST 2 VIEWS: CPT

## 2025-05-06 RX ORDER — BUDESONIDE, GLYCOPYRROLATE, AND FORMOTEROL FUMARATE 160; 9; 4.8 UG/1; UG/1; UG/1
2 AEROSOL, METERED RESPIRATORY (INHALATION) 2 TIMES DAILY
Qty: 5.4 G | Refills: 0 | Status: SHIPPED | COMMUNITY
Start: 2025-05-06

## 2025-05-06 NOTE — ASSESSMENT & PLAN NOTE
She will continue with her supplemental oxygen up to 5 L as needed with goal saturations greater than or equal to 89%.  If she has any escalating oxygen requirements or change in symptoms, she will return call to the office.  She deferred walk test in the office today and will continue to monitor her pulse ox at home.  Repeat walk test with PFTs next month.

## 2025-05-06 NOTE — ASSESSMENT & PLAN NOTE
She is S/P BLVR x 2 to the SHIN/lingula on April 28, 2025.  She remains stable in regard to chronic symptoms.  For cough management, she will continue with Tessalon and Tussionex as needed for cough.  She will continue with hypertonic saline and Xopenex and her nebulizer to assist with mucus expectoration.  She continues to wear her precaution bracelet and will limit lifting greater than 5 to 10 pounds and left arm movement for another week.  She did not have her 1 week follow-up chest x-ray.  Because she is not feeling significant improvement and has been coughing, I will order this to be done in the next few days.  I will call or message her with the results.      She has her 6-week PFT with walk test and CT scheduled on Smiley 10 with follow-up with Dr. Jimenez on June 11.  She is aware to call or message with any concerns or questions in the interim.  In regard to pulmonary rehab, she does not feel as though she is ready to go back next week.  I advised her that she can hold off an additional week if needed but to continue with activity as tolerated.  In regard to her Trelegy, she uses it daily, but feels better with Breztri.  She has gone back and forth between the 2 in the past.  I have given her 2 samples of Breztri today and if she finds that the Breztri is more beneficial in regard to symptoms, we could transition to Breztri.  She is aware of proper use and technique and will update our office with use.    Breztri: Samples of this drug were given to the patient, quantity 2, Lot Number [id number]. The following was discussed with the patient as indicated: administration, storage, potential interactions, side effects.  Orders:    XR chest pa and lateral; Future

## 2025-05-06 NOTE — LETTER
May 6, 2025     Svetlana Klein MD  [address]  [address]  [place] [address]    Patient: Susanna Guevara   YOB: 1950   Date of Visit: 2025       Dear MD Stiven Calixto MD Deborah Stahlnecker, DO:    Thank you for referring Susanna Guevara to me for evaluation. Below are my notes for this consultation.    If you have questions, please do not hesitate to call me. I look forward to following your patient along with you.         Sincerely,        TAVARES Naranjo        CC: MD Eliane Dyson DO Kelly E Martorano, CRNP  2025 10:06 AM  Sign when Signing Visit  Follow-up  Visit - Pulmonary Medicine   Name: Susanna Guevara      : 1950      MRN: [id number]  Encounter Provider: TAVARES Naranjo  Encounter Date: 2025   Encounter department: [facility]  :  Assessment & Plan  COPD, severe (HCC)  She is S/P BLVR x 2 to the SHIN/lingula on 2025.  She remains stable in regard to chronic symptoms.  For cough management, she will continue with Tessalon and Tussionex as needed for cough.  She will continue with hypertonic saline and Xopenex and her nebulizer to assist with mucus expectoration.  She continues to wear her precaution bracelet and will limit lifting greater than 5 to 10 pounds and left arm movement for another week.  She did not have her 1 week follow-up chest x-ray.  Because she is not feeling significant improvement and has been coughing, I will order this to be done in the next few days.  I will call or message her with the results.      She has her 6-week PFT with walk test and CT scheduled on Smiley 10 with follow-up with Dr. Jimenez on .  She is aware to call or message with any concerns or questions in the interim.  In regard to pulmonary rehab, she does not feel as though she is ready to go back next week.  I advised her that she can hold off an  additional week if needed but to continue with activity as tolerated.  In regard to her Trelegy, she uses it daily, but feels better with Breztri.  She has gone back and forth between the 2 in the past.  I have given her 2 samples of Breztri today and if she finds that the Breztri is more beneficial in regard to symptoms, we could transition to Breztri.  She is aware of proper use and technique and will update our office with use.    Breztri: Samples of this drug were given to the patient, quantity 2, Lot Number [id number]. The following was discussed with the patient as indicated: administration, storage, potential interactions, side effects.  Orders:  •  XR chest pa and lateral; Future    Chronic hypoxic respiratory failure (HCC)  She will continue with her supplemental oxygen up to 5 L as needed with goal saturations greater than or equal to 89%.  If she has any escalating oxygen requirements or change in symptoms, she will return call to the office.  She deferred walk test in the office today and will continue to monitor her pulse ox at home.  Repeat walk test with PFTs next month.       Tobacco abuse, in remission  She quit in 2017.  Continued complete cessation.       Lung nodule  Will have a follow-up CT next month related to endobronchial valve placement.  Lung nodule can also be visualized at that time and further need for imaging can be delineated then.         Return in about 5 weeks (around 6/10/2025), or if symptoms worsen or fail to improve.    All of Susanna's and her 's questions were answered prior to leaving the office today. She will follow-up as listed above or sooner should the need arise, and is aware to call the office with any further questions or concerns.      History of Present Illness  Susanna Guevara is a 74 y.o. female who presents for follow-up after endobronchial valve placement x 2 in the left upper lobe/lingula on April 28, 2025.  She reports that overall she feels the same as  "preprocedure.  She has similar fatigue and dyspnea.  She continues with her chronic cough.  She reports that her cough is worse in the morning and she has mucus to expectorate.  No new fevers or chills.  No chest pain.  She has also had an increase in her supplemental oxygen requirement to 4 to 5 L as opposed to her chronic 3 L.  Saturations are in the mid 90s.  She continues to remain active at home.  Otherwise, no new physical complaints.  She does report that she feels the Trelegy is not particularly beneficial.  She does find the Breztri to be more helpful.    Review of Systems   All other systems reviewed and are negative.  Aside from what is mentioned in the HPI, ROS is otherwise negative.     Medical History Reviewed by provider this encounter:  Tobacco  Allergies  Meds  Problems  Med Hx  Surg Hx  Fam Hx     .    Objective  /64 (BP Location: Left arm, Patient Position: Sitting, Cuff Size: Extra-Large)   Pulse (!) 110   Temp 97.5 °F (36.4 °C) (Temporal)   Resp 12   Ht 4' 11\" (1.499 m)   Wt 62.6 kg (138 lb)   SpO2 97% Comment: on 4l cont. at rest. 90 on 4l pulse at arrival.  BMI 27.87 kg/m²     Physical Exam  Vitals reviewed.   Constitutional:       General: She is not in acute distress.     Appearance: She is well-developed. She is not toxic-appearing or diaphoretic.      Interventions: Nasal cannula in place.   HENT:      Head: Normocephalic and atraumatic.   Eyes:      General: No scleral icterus.     Extraocular Movements: Extraocular movements intact.   Neck:      Trachea: No tracheal deviation.   Cardiovascular:      Rate and Rhythm: Normal rate and regular rhythm.      Heart sounds: S1 normal and S2 normal. No murmur heard.     No friction rub. No gallop.   Pulmonary:      Effort: Pulmonary effort is normal. No tachypnea, accessory muscle usage or respiratory distress.      Breath sounds: Normal breath sounds. No stridor. No decreased breath sounds, wheezing, rhonchi or rales. "   Chest:      Chest wall: No tenderness.   Abdominal:      General: Bowel sounds are normal. There is no distension.      Palpations: Abdomen is soft.      Tenderness: There is no abdominal tenderness.   Musculoskeletal:      Cervical back: Neck supple.      Right lower leg: No edema.      Left lower leg: No edema.   Skin:     General: Skin is warm and dry.      Findings: No rash.   Neurological:      Mental Status: She is alert and oriented to person, place, and time.      GCS: GCS eye subscore is 4. GCS verbal subscore is 5. GCS motor subscore is 6.   Psychiatric:         Speech: Speech normal.         Behavior: Behavior normal. Behavior is cooperative.       Diagnostic Data:  No new pulmonary diagnostics.  Chest x-ray on May 1 did show left upper lobe collapse and endobronchial valves in place.      TAVARES Naranjo

## 2025-05-06 NOTE — ASSESSMENT & PLAN NOTE
Will have a follow-up CT next month related to endobronchial valve placement.  Lung nodule can also be visualized at that time and further need for imaging can be delineated then.

## 2025-05-06 NOTE — PROGRESS NOTES
Follow-up  Visit - Pulmonary Medicine   Name: Susanna Guevara      : 1950      MRN: [id number]  Encounter Provider: TAVARES Naranjo  Encounter Date: 2025   Encounter department: [facility]  :  Assessment & Plan  COPD, severe (HCC)  She is S/P BLVR x 2 to the SHIN/lingula on 2025.  She remains stable in regard to chronic symptoms.  For cough management, she will continue with Tessalon and Tussionex as needed for cough.  She will continue with hypertonic saline and Xopenex and her nebulizer to assist with mucus expectoration.  She continues to wear her precaution bracelet and will limit lifting greater than 5 to 10 pounds and left arm movement for another week.  She did not have her 1 week follow-up chest x-ray.  Because she is not feeling significant improvement and has been coughing, I will order this to be done in the next few days.  I will call or message her with the results.      She has her 6-week PFT with walk test and CT scheduled on Smiley 10 with follow-up with Dr. Jimenez on .  She is aware to call or message with any concerns or questions in the interim.  In regard to pulmonary rehab, she does not feel as though she is ready to go back next week.  I advised her that she can hold off an additional week if needed but to continue with activity as tolerated.  In regard to her Trelegy, she uses it daily, but feels better with Breztri.  She has gone back and forth between the 2 in the past.  I have given her 2 samples of Breztri today and if she finds that the Breztri is more beneficial in regard to symptoms, we could transition to Breztri.  She is aware of proper use and technique and will update our office with use.    Breztri: Samples of this drug were given to the patient, quantity 2, Lot Number [id number]. The following was discussed with the patient as indicated: administration, storage, potential interactions, side effects.  Orders:     XR chest pa and lateral; Future    Chronic hypoxic respiratory failure (HCC)  She will continue with her supplemental oxygen up to 5 L as needed with goal saturations greater than or equal to 89%.  If she has any escalating oxygen requirements or change in symptoms, she will return call to the office.  She deferred walk test in the office today and will continue to monitor her pulse ox at home.  Repeat walk test with PFTs next month.       Tobacco abuse, in remission  She quit in 2017.  Continued complete cessation.       Lung nodule  Will have a follow-up CT next month related to endobronchial valve placement.  Lung nodule can also be visualized at that time and further need for imaging can be delineated then.         Return in about 5 weeks (around 6/10/2025), or if symptoms worsen or fail to improve.    All of Susanna's and her 's questions were answered prior to leaving the office today. She will follow-up as listed above or sooner should the need arise, and is aware to call the office with any further questions or concerns.      History of Present Illness   Susanna Guevara is a 74 y.o. female who presents for follow-up after endobronchial valve placement x 2 in the left upper lobe/lingula on April 28, 2025.  She reports that overall she feels the same as preprocedure.  She has similar fatigue and dyspnea.  She continues with her chronic cough.  She reports that her cough is worse in the morning and she has mucus to expectorate.  No new fevers or chills.  No chest pain.  She has also had an increase in her supplemental oxygen requirement to 4 to 5 L as opposed to her chronic 3 L.  Saturations are in the mid 90s.  She continues to remain active at home.  Otherwise, no new physical complaints.  She does report that she feels the Trelegy is not particularly beneficial.  She does find the Breztri to be more helpful.    Review of Systems   All other systems reviewed and are negative.  Aside from what is  "mentioned in the HPI, ROS is otherwise negative.     Medical History Reviewed by provider this encounter:  Tobacco  Allergies  Meds  Problems  Med Hx  Surg Hx  Fam Hx     .    Objective   /64 (BP Location: Left arm, Patient Position: Sitting, Cuff Size: Extra-Large)   Pulse (!) 110   Temp 97.5 °F (36.4 °C) (Temporal)   Resp 12   Ht 4' 11\" (1.499 m)   Wt 62.6 kg (138 lb)   SpO2 97% Comment: on 4l cont. at rest. 90 on 4l pulse at arrival.  BMI 27.87 kg/m²     Physical Exam  Vitals reviewed.   Constitutional:       General: She is not in acute distress.     Appearance: She is well-developed. She is not toxic-appearing or diaphoretic.      Interventions: Nasal cannula in place.   HENT:      Head: Normocephalic and atraumatic.   Eyes:      General: No scleral icterus.     Extraocular Movements: Extraocular movements intact.   Neck:      Trachea: No tracheal deviation.   Cardiovascular:      Rate and Rhythm: Normal rate and regular rhythm.      Heart sounds: S1 normal and S2 normal. No murmur heard.     No friction rub. No gallop.   Pulmonary:      Effort: Pulmonary effort is normal. No tachypnea, accessory muscle usage or respiratory distress.      Breath sounds: Normal breath sounds. No stridor. No decreased breath sounds, wheezing, rhonchi or rales.   Chest:      Chest wall: No tenderness.   Abdominal:      General: Bowel sounds are normal. There is no distension.      Palpations: Abdomen is soft.      Tenderness: There is no abdominal tenderness.   Musculoskeletal:      Cervical back: Neck supple.      Right lower leg: No edema.      Left lower leg: No edema.   Skin:     General: Skin is warm and dry.      Findings: No rash.   Neurological:      Mental Status: She is alert and oriented to person, place, and time.      GCS: GCS eye subscore is 4. GCS verbal subscore is 5. GCS motor subscore is 6.   Psychiatric:         Speech: Speech normal.         Behavior: Behavior normal. Behavior is " cooperative.       Diagnostic Data:  No new pulmonary diagnostics.  Chest x-ray on May 1 did show left upper lobe collapse and endobronchial valves in place.      TAVARES Naranjo

## 2025-05-07 ENCOUNTER — APPOINTMENT (OUTPATIENT)
Dept: PULMONOLOGY | Facility: CLINIC | Age: 75
End: 2025-05-07
Payer: MEDICARE

## 2025-05-07 ENCOUNTER — TELEPHONE (OUTPATIENT)
Dept: PULMONOLOGY | Facility: CLINIC | Age: 75
End: 2025-05-07

## 2025-05-08 ENCOUNTER — RESULTS FOLLOW-UP (OUTPATIENT)
Dept: PULMONOLOGY | Facility: MEDICAL CENTER | Age: 75
End: 2025-05-08

## 2025-05-12 ENCOUNTER — APPOINTMENT (OUTPATIENT)
Dept: PULMONOLOGY | Facility: CLINIC | Age: 75
End: 2025-05-12
Payer: MEDICARE

## 2025-05-12 LAB — FUNGUS SPEC CULT: NORMAL

## 2025-05-13 LAB
MYCOBACTERIUM SPEC CULT: NORMAL
RHODAMINE-AURAMINE STN SPEC: NORMAL

## 2025-05-14 ENCOUNTER — APPOINTMENT (OUTPATIENT)
Dept: PULMONOLOGY | Facility: CLINIC | Age: 75
End: 2025-05-14
Payer: MEDICARE

## 2025-05-15 ENCOUNTER — APPOINTMENT (OUTPATIENT)
Dept: LAB | Facility: CLINIC | Age: 75
End: 2025-05-15
Payer: MEDICARE

## 2025-05-15 DIAGNOSIS — E11.9 TYPE 2 DIABETES MELLITUS WITHOUT COMPLICATION, WITHOUT LONG-TERM CURRENT USE OF INSULIN (HCC): Chronic | ICD-10-CM

## 2025-05-15 DIAGNOSIS — I25.83 CORONARY ARTERY DISEASE DUE TO LIPID RICH PLAQUE: ICD-10-CM

## 2025-05-15 DIAGNOSIS — I25.10 CORONARY ARTERY DISEASE DUE TO LIPID RICH PLAQUE: ICD-10-CM

## 2025-05-15 LAB
ALBUMIN SERPL BCG-MCNC: 4.2 G/DL (ref 3.5–5)
ALP SERPL-CCNC: 81 U/L (ref 34–104)
ALT SERPL W P-5'-P-CCNC: 10 U/L (ref 7–52)
ANION GAP SERPL CALCULATED.3IONS-SCNC: 9 MMOL/L (ref 4–13)
AST SERPL W P-5'-P-CCNC: 13 U/L (ref 13–39)
BILIRUB SERPL-MCNC: 0.49 MG/DL (ref 0.2–1)
BUN SERPL-MCNC: 15 MG/DL (ref 5–25)
CALCIUM SERPL-MCNC: 9.6 MG/DL (ref 8.4–10.2)
CHLORIDE SERPL-SCNC: 99 MMOL/L (ref 96–108)
CHOLEST SERPL-MCNC: 144 MG/DL (ref ?–200)
CO2 SERPL-SCNC: 31 MMOL/L (ref 21–32)
CREAT SERPL-MCNC: 0.71 MG/DL (ref 0.6–1.3)
EST. AVERAGE GLUCOSE BLD GHB EST-MCNC: 154 MG/DL
GFR SERPL CREATININE-BSD FRML MDRD: 83 ML/MIN/1.73SQ M
GLUCOSE P FAST SERPL-MCNC: 122 MG/DL (ref 65–99)
HBA1C MFR BLD: 7 %
HDLC SERPL-MCNC: 58 MG/DL
LDLC SERPL CALC-MCNC: 52 MG/DL (ref 0–100)
POTASSIUM SERPL-SCNC: 4 MMOL/L (ref 3.5–5.3)
PROT SERPL-MCNC: 7.2 G/DL (ref 6.4–8.4)
SODIUM SERPL-SCNC: 139 MMOL/L (ref 135–147)
TRIGL SERPL-MCNC: 168 MG/DL (ref ?–150)

## 2025-05-15 PROCEDURE — 80053 COMPREHEN METABOLIC PANEL: CPT

## 2025-05-15 PROCEDURE — 80061 LIPID PANEL: CPT

## 2025-05-15 PROCEDURE — 83036 HEMOGLOBIN GLYCOSYLATED A1C: CPT

## 2025-05-15 PROCEDURE — 36415 COLL VENOUS BLD VENIPUNCTURE: CPT

## 2025-05-19 ENCOUNTER — CLINICAL SUPPORT (OUTPATIENT)
Dept: PULMONOLOGY | Facility: CLINIC | Age: 75
End: 2025-05-19
Attending: INTERNAL MEDICINE
Payer: MEDICARE

## 2025-05-19 DIAGNOSIS — J43.2 CENTRILOBULAR EMPHYSEMA (HCC): Primary | ICD-10-CM

## 2025-05-19 LAB — FUNGUS SPEC CULT: NORMAL

## 2025-05-19 PROCEDURE — 94625 PHY/QHP OP PULM RHB W/O MNTR: CPT

## 2025-05-20 ENCOUNTER — OFFICE VISIT (OUTPATIENT)
Dept: FAMILY MEDICINE CLINIC | Facility: CLINIC | Age: 75
End: 2025-05-20
Payer: MEDICARE

## 2025-05-20 VITALS
DIASTOLIC BLOOD PRESSURE: 80 MMHG | OXYGEN SATURATION: 94 % | BODY MASS INDEX: 27.21 KG/M2 | HEIGHT: 59 IN | SYSTOLIC BLOOD PRESSURE: 135 MMHG | HEART RATE: 101 BPM | WEIGHT: 135 LBS

## 2025-05-20 DIAGNOSIS — E11.9 TYPE 2 DIABETES MELLITUS WITHOUT COMPLICATION, WITHOUT LONG-TERM CURRENT USE OF INSULIN (HCC): Chronic | ICD-10-CM

## 2025-05-20 DIAGNOSIS — R00.0 CHRONIC TACHYCARDIA: ICD-10-CM

## 2025-05-20 DIAGNOSIS — Z12.31 ENCOUNTER FOR SCREENING MAMMOGRAM FOR BREAST CANCER: ICD-10-CM

## 2025-05-20 DIAGNOSIS — Z78.0 MENOPAUSE: ICD-10-CM

## 2025-05-20 DIAGNOSIS — J44.1 CHRONIC OBSTRUCTIVE PULMONARY DISEASE WITH ACUTE EXACERBATION (HCC): ICD-10-CM

## 2025-05-20 DIAGNOSIS — Z53.20 COLON CANCER SCREENING DECLINED: ICD-10-CM

## 2025-05-20 DIAGNOSIS — Z99.81 DEPENDENCE ON SUPPLEMENTAL OXYGEN: ICD-10-CM

## 2025-05-20 DIAGNOSIS — Z76.89 ENCOUNTER TO ESTABLISH CARE: Primary | ICD-10-CM

## 2025-05-20 DIAGNOSIS — E78.5 HYPERLIPIDEMIA LDL GOAL <70: ICD-10-CM

## 2025-05-20 LAB
MYCOBACTERIUM SPEC CULT: NORMAL
RHODAMINE-AURAMINE STN SPEC: NORMAL

## 2025-05-20 PROCEDURE — G2211 COMPLEX E/M VISIT ADD ON: HCPCS | Performed by: FAMILY MEDICINE

## 2025-05-20 PROCEDURE — 99214 OFFICE O/P EST MOD 30 MIN: CPT | Performed by: FAMILY MEDICINE

## 2025-05-20 NOTE — ASSESSMENT & PLAN NOTE
- LDL 52  - cont statin   - The 10-year ASCVD risk score (Rukhsana FREEMAN, et al., 2019) is: 30.6%    Values used to calculate the score:      Age: 74 years      Clincally relevant sex: Female      Is Non- : No      Diabetic: Yes      Tobacco smoker: No      Systolic Blood Pressure: 135 mmHg      Is BP treated: No      HDL Cholesterol: 58 mg/dL      Total Cholesterol: 144 mg/dL

## 2025-05-20 NOTE — ASSESSMENT & PLAN NOTE
- t/c switching rescue inhaler from Albuterol to Xopenex given h/o chronic tachycardia   - advised to d/w Pulm at next OV - pt aware and agreeable

## 2025-05-20 NOTE — ASSESSMENT & PLAN NOTE
- A1c = 7.0   - stable renal function on CMP   - will check urine microalbumin   - cont Metformin 500mg QD   - diet/exercise - caution with sugar and carb intake   - UTD with PCV20  - UTD with latest COVID Booster  - UTD with annual Flu vaccine   - DM foot exam as documented below  - deferred IRIS eye exam till next OV   - RTO in 6months, with labs, for f/u and AWV - pt and spouse aware and agreeable   Lab Results   Component Value Date    HGBA1C 7.0 (H) 05/15/2025     Orders:    metFORMIN (GLUCOPHAGE) 500 mg tablet; Take 1 tablet (500 mg total) by mouth daily with breakfast    Hemoglobin A1C; Future    Comprehensive metabolic panel; Future    Albumin / creatinine urine ratio; Future

## 2025-05-20 NOTE — PROGRESS NOTES
Name: Susanna Guevara      : 1950      MRN: [id number]  Encounter Provider: Tangela Escoto DO  Encounter Date: 2025   Encounter department: [facility]  :  Assessment & Plan  Encounter to establish care  - reviewed PMHx, PSHx, meds, allergies, Fhx and Soc Hx   - s/p elective BLVR - with 2 [place] valves placed into the left upper lobe/lingula 3wks ago - follows closely with Pulm and Pulm Rehab   - UTD with PCV20  - UTD with RSV  - UTD with latest COVID Booster  - UTD with annual Flu vaccine   - Cervical Ca screening no longer indicated  - due for Mammo - script given   - due for DXA - script given   - declined Colon Ca screening   - reviewed labs done 5/15/2025 - see below  - RTO in 6months, with labs, for f/u and AWV - pt and spouse aware and agreeable        Type 2 diabetes mellitus without complication, without long-term current use of insulin (HCC)  - A1c = 7.0   - stable renal function on CMP   - will check urine microalbumin   - cont Metformin 500mg QD   - diet/exercise - caution with sugar and carb intake   - UTD with PCV20  - UTD with latest COVID Booster  - UTD with annual Flu vaccine   - DM foot exam as documented below  - deferred IRIS eye exam till next OV   - RTO in 6months, with labs, for f/u and AWV - pt and spouse aware and agreeable   Lab Results   Component Value Date    HGBA1C 7.0 (H) 05/15/2025     Orders:    metFORMIN (GLUCOPHAGE) 500 mg tablet; Take 1 tablet (500 mg total) by mouth daily with breakfast    Hemoglobin A1C; Future    Comprehensive metabolic panel; Future    Albumin / creatinine urine ratio; Future    Hyperlipidemia LDL goal <70  - LDL 52  - cont statin   - The 10-year ASCVD risk score (Rukhsana FREEMAN, et al., 2019) is: 30.6%    Values used to calculate the score:      Age: 74 years      Clincally relevant sex: Female      Is Non- : No      Diabetic: Yes      Tobacco smoker: No      Systolic Blood Pressure: 135 mmHg      Is BP  "treated: No      HDL Cholesterol: 58 mg/dL      Total Cholesterol: 144 mg/dL       Menopause    Orders:    DXA bone density spine hip and pelvis; Future    Encounter for screening mammogram for breast cancer    Orders:    Mammo screening bilateral w 3d and cad; Future    Colon cancer screening declined         Chronic obstructive pulmonary disease with acute exacerbation (HCC)  - s/p elective BLVR - with 2 [place] valves placed into the left upper lobe/lingula 3wks ago   - currently on 4-5L of O2 via NC   - follows closely with Pulm and Pulm Rehab        Dependence on supplemental oxygen         Chronic tachycardia  - t/c switching rescue inhaler from Albuterol to Xopenex given h/o chronic tachycardia   - advised to d/w Pulm at next OV - pt aware and agreeable               History of Present Illness   Susanna Guevara is a 74 y.o. female who presents to the office with her  to establish care and for f/u   - prior PCP: switching PCPs in the office   - Specialists: Cardio, Pulm  - PMHx: severe COPD, lung nodule, chronic hypoxic resp failure on 4-5L O2, DM2, HL, chronic tachycardia   - allergies: Pred - angioedema   - Meds: see med rec   - PSHx: cardiac cath, hand surgery, s/p elective BLVR - with 2 [place] valves placed into the left upper lobe/lingula  - FHx:M (liver cirrhosis), Sister (COPD, DM)  - Immunizations: UTD with PCV20, UTD with RSV, UTD with latest COVID Booster, UTD with annual Flu vaccine   - GYN Hx: Cervical Ca screening no longer indicated, due for Mammo   - Hm: declined Colon Ca screening   - diet/exercise: not currently exercising   - social: former smoker   - ROS: today in the office pt denies F/C/N/V/HA/visual changes/CP/palpitations/SOB/wheezing/abd pain/D/LE edema      Review of Systems as per HPI     Objective   /80   Pulse 101   Ht 4' 11\" (1.499 m)   Wt 61.2 kg (135 lb)   SpO2 94%   BMI 27.27 kg/m²      Physical Exam  Vitals reviewed.   Constitutional:       General: She is not " in acute distress.     Appearance: Normal appearance. She is not ill-appearing, toxic-appearing or diaphoretic.   HENT:      Head: Normocephalic and atraumatic.      Right Ear: External ear normal.      Left Ear: External ear normal.      Nose:      Comments: 4L O2 via NC    Eyes:      General:         Right eye: No discharge.         Left eye: No discharge.      Extraocular Movements: Extraocular movements intact.      Conjunctiva/sclera: Conjunctivae normal.       Cardiovascular:      Rate and Rhythm: Regular rhythm. Tachycardia present.      Pulses: no weak pulses.           Dorsalis pedis pulses are 2+ on the right side and 2+ on the left side.      Heart sounds: Normal heart sounds.   Pulmonary:      Effort: Pulmonary effort is normal. No respiratory distress.      Breath sounds: Normal breath sounds. No wheezing.   Abdominal:      Palpations: Abdomen is soft.     Musculoskeletal:         General: Normal range of motion.      Cervical back: Normal range of motion.      Right lower leg: No edema.      Left lower leg: No edema.   Feet:      Right foot:      Skin integrity: No ulcer, skin breakdown, erythema, warmth, callus or dry skin.      Left foot:      Skin integrity: No ulcer, skin breakdown, erythema, warmth, callus or dry skin.     Skin:     General: Skin is warm.     Neurological:      General: No focal deficit present.      Mental Status: She is alert and oriented to person, place, and time.     Psychiatric:         Mood and Affect: Mood normal.         Behavior: Behavior normal.       Patient's shoes and socks removed.    Right Foot/Ankle   Right Foot Inspection  Skin Exam: skin normal and skin intact. No dry skin, no warmth, no callus, no erythema, no maceration, no abnormal color, no pre-ulcer, no ulcer and no callus.     Toe Exam: ROM and strength within normal limits.     Sensory   Monofilament testing: intact    Vascular  The right DP pulse is 2+.     Left Foot/Ankle  Left Foot Inspection  Skin  Exam: skin normal and skin intact. No dry skin, no warmth, no erythema, no maceration, normal color, no pre-ulcer, no ulcer and no callus.     Toe Exam: ROM and strength within normal limits.     Sensory   Monofilament testing: intact    Vascular  The left DP pulse is 2+.     Assign Risk Category  No deformity present  No loss of protective sensation  No weak pulses  Risk: 0

## 2025-05-21 ENCOUNTER — CLINICAL SUPPORT (OUTPATIENT)
Dept: PULMONOLOGY | Facility: CLINIC | Age: 75
End: 2025-05-21
Attending: INTERNAL MEDICINE
Payer: MEDICARE

## 2025-05-21 DIAGNOSIS — J43.2 CENTRILOBULAR EMPHYSEMA (HCC): Primary | ICD-10-CM

## 2025-05-21 PROCEDURE — 94625 PHY/QHP OP PULM RHB W/O MNTR: CPT

## 2025-05-26 ENCOUNTER — APPOINTMENT (OUTPATIENT)
Dept: PULMONOLOGY | Facility: CLINIC | Age: 75
End: 2025-05-26
Payer: MEDICARE

## 2025-05-28 ENCOUNTER — CLINICAL SUPPORT (OUTPATIENT)
Dept: PULMONOLOGY | Facility: CLINIC | Age: 75
End: 2025-05-28
Attending: INTERNAL MEDICINE
Payer: MEDICARE

## 2025-05-28 DIAGNOSIS — J43.2 CENTRILOBULAR EMPHYSEMA (HCC): Primary | ICD-10-CM

## 2025-05-28 PROCEDURE — 94625 PHY/QHP OP PULM RHB W/O MNTR: CPT

## 2025-06-02 ENCOUNTER — CLINICAL SUPPORT (OUTPATIENT)
Dept: PULMONOLOGY | Facility: CLINIC | Age: 75
End: 2025-06-02
Attending: INTERNAL MEDICINE
Payer: MEDICARE

## 2025-06-02 DIAGNOSIS — J43.2 CENTRILOBULAR EMPHYSEMA (HCC): Primary | ICD-10-CM

## 2025-06-02 LAB — FUNGUS SPEC CULT: NORMAL

## 2025-06-02 PROCEDURE — 94625 PHY/QHP OP PULM RHB W/O MNTR: CPT

## 2025-06-02 NOTE — PROGRESS NOTES
Pulmonary Rehabilitation   Assessment and Individualized Treatment Plan  60 DAY      Today's date: 2025  # of Exercise Sessions Completed: 9  Patient name: Susanna Guevara     : 1950       MRN: [id number]  Referring Physician: Eliane Jimenez DO  Pulmonologist: Stiven Rocha  Provider: [place]  Clinician: Eric Delgado MS, CEP     Dx:    Encounter Diagnosis   Name Primary?    Centrilobular emphysema (HCC) Yes     Date of onset: 25      Treatment is tailored to this patient's individual needs.  The ITP was reviewed with the patient and all questions were answered to their satisfaction.  Additional ITP documentation can be found electronically including daily and monthly exercise summaries, daily session notes with ECG summaries, education notes, daily medication reconciliation, and daily physician supervision.    60 DAY SUMMARY DATE: 2025    Resting BP  100/64 - 114/68,  HR 90 - 111  Exercise /62- 112/72.   - 127  Exercise session details:  30-35 minutes,  1.6 - 2.0 METs  LO2:   Rest:  4 L/min, Exercise:  4L/min  SpO2: Rest:  95-97%,  Exercise:  87-96%  Dyspnea:   Rest:  0-3/10,  Exercise: 0-7/10  Home exercise/ADLs: Continues to do some body exercises at home and back to attending rehab 2x/week. Completing light ADLs with MATOS. Continues to use continuous supplemental O2.   Patient's subjective report of progress: She reports she has noticed slight improvement in her coughing and mucus production. She still doesn't like being on supplemental O2 continuously. She sees the surgeon (Dr. Jimenez) this month.   Clinical Comments: She is back to attending 2x/week following BLVR procedure. Continues to have good and bad days. She is happy to be back and can't wait to start seeing more improvement. Will continue to progress and education as tolerated.   .    30 DAY SUMMARY DATE: May 5, 2025    Resting BP  90/68 - 122/80,  HR 72 - 95  Exercise /84- 140/62.  HR  115 - 125  Exercise session details:  29-36 minutes,  1.6-2.0 METs  LO2:   Rest:  3 L/min, Exercise:  3L/min  SpO2: Rest:  92-98%,  Exercise:  89-98%  Dyspnea:   Rest:  0-1/10,  Exercise: 0-5/10  Home exercise/ADLs: Unable to assess due to absence.   Patient's subjective report of progress: Unable to assess due to absence   Clinical Comments: Patient has been out due to having her [place] Valve surgery 4/28. She has her follow-up her appointment tomorrow. Unable to assess progression due to absence. She attended 5 sessions prior to her valve surgery.   .      INITIAL EVALUATION SUMMARY April 7, 2025    Patient's subjective report of progress/symptoms: feels fatigued as soon as she gets up to walk   Home exercise/ADLs: no formal exercise,  helps her with completing her moderate to heavy ADL's  Clinical Comments: none    Initial Fitness Assessment: 6MWT:  Resting:    Resting:  BP: 122/78  HR: 95  SpO2: 98  Dyspnea: 0  Exercise:  BP: 138/84  HR: 102-129  SpO2: 85-90%  Dyspnea: 2-6  O2 use: 3 l/min  Symptoms: SOBOE,fatigue       ASSESSMENT      Medical History:   Past Medical History:   Diagnosis Date    COPD (chronic obstructive pulmonary disease) (HCC)     Diabetes mellitus (HCC)     Hyperlipidemia     Pulmonary nodule 8/31/2017       Family History:  Family History   Problem Relation Name Age of Onset    Liver disease Mother      Substance Abuse Father      COPD Sister      Diabetes Sister      No Known Problems Sister      No Known Problems Daughter      No Known Problems Daughter      No Known Problems Son         Allergies:   Prednisone and Medrol [methylprednisolone]    Current Medications:   Current Outpatient Medications   Medication Sig Dispense Refill    albuterol (PROVENTIL HFA,VENTOLIN HFA) 90 mcg/act inhaler INHALE 2 PUFFS BY MOUTH EVERY 4 HOURS AS NEEDED FOR WHEEZING 8 g 5    Budeson-Glycopyrrol-Formoterol (Breztri Aerosphere) 160-9-4.8 MCG/ACT AERO Inhale 2 puffs 2 (two) times a day Rinse mouth  after use. 5.4 g 0    Calcium 500-100 MG-UNIT CHEW Chew 1 tablet in the morning.      fluticasone-umeclidinium-vilanterol (Trelegy Ellipta) 100-62.5-25 mcg/actuation inhaler Inhale 1 puff daily Rinse mouth after use. 3 each 0    ibuprofen (MOTRIN) 600 mg tablet Take 600 mg by mouth as needed in the morning and 600 mg as needed at noon and 600 mg as needed in the evening.  .      levalbuterol (XOPENEX) 1.25 mg/3 mL nebulizer solution TAKE 1 VIAL BY NEBULIZATION EVERY 8 (EIGHT) HOURS AS NEEDED FOR WHEEZING OR SHORTNESS OF BREATH 810 mL 2    metFORMIN (GLUCOPHAGE) 500 mg tablet Take 1 tablet (500 mg total) by mouth daily with breakfast 90 tablet 1    rosuvastatin (CRESTOR) 20 MG tablet TAKE 1 TABLET BY MOUTH EVERY DAY 90 tablet 1    sodium chloride 3 % inhalation solution Take 4 mL by nebulization 3 (three) times a day 360 mL 3     No current facility-administered medications for this visit.       Physical Limitations: none    Fall Risk: Moderate   Comments: Ambulates with a steady gait with no assist device and Denies a fall in the past 6 months    Cultural needs: none    PFT:  Yes     FEV1/FVC ratio of 47.88%   FEV1 of 36% predicted  moderate obstruction.    Medication compliance: Yes  Comments: Pt reports to be compliant with medications      Pulmonary Disease Risk Factors:  Past smoker over 8 years ago    Sleep Disorders:   obstructive sleep apnea:  CPCP/BiPAP:  no      EXERCISE ASSESSMENT:      Current Functional Status:  Occupation: unemployed  Recreation/Physical activity: enjoys walking   ADL’s:Capable of performing light ADLs only cooks,dusting   [place]: able to perform self-care tough time with stairs   Exercise:  Type: body weight exercise, Frequency: 5 days/week, Duration: 10-15 mins  Home exercise equipment: exercise bike   Other Comments: none     SMART Exercise Goals:   reduced score on CAT, improved 6MWT distance, reduced dyspnea during exercise, improved exercise tolerance based on peak METs  tolerated in pulmonary rehab exercise session, SpO2 >88% during exercise, and reduced RPD at rest    Patient Specific EXERCISE GOALS:     reduced dependence on supplemental O2, reduced number of COPD exacerbations, attend pulmonary rehab regularly, decrease sitting time at home, start a walking program, begin a consistent exercise regimen , reduced pulmonary related hospital readmissions , decreased rest needed with physical activity/exercise, increased muscular strength, increased energy, increased stamina with ADLs, and more independence at home    PSYCHOSOCIAL ASSESSMENT:    Date of last assessment: 4/7/25  Depression screening:  PHQ-9 = 2    Interpretation:  1-4 = Minimal Depression  Anxiety screening:  EMIL-7 = 0    Interpretation: 0-4  = Not anxious    Pt self-report of depression and anxiety   Patient reports they are coping well with good social support and denies depression or anxiety  Patient does not have sufficient emotional support    Self-reported stress level:  8  Stress Management: practice Relaxation Techniques, time management, spend time outside, enjoy a hobby, spend time with family, and TV    Quality of Life Screen:  (Higher score indicates disease impact on QOL)  [facility] COOP score: 25/40      CAT: 21/40      Shortness of breath questionnaire: 60/120    Social Support:   significant other, children, and grandchildren  Community/Social Activities: none    SMART Goals:    Social Support in [place] Score < 3, Daily Activity in [place] Score < 3, Pain in [place] Score < 3, Overall Health in [facility] Score < 3, Change in Health in [facility] Score < 3 , Improved appetite control, improved concentration, and control or stop worrying    Patient Specific PSYCHOCOSOCIAL GOALS:    maintain compliance with medical therapy, begin using [employer], and spend time with family     Psychosocial Assessment as it relates to rehabilitation: Patient denies issues with his/her family or home life that may  "affect their rehabilitation efforts.       NUTRITION ASSESSMENT:    Initial Weight:  140  Current Weight: 137.0 lbs     Height:   Ht Readings from Last 1 Encounters:   05/20/25 4' 11\" (1.499 m)       Rate Your Plate Score: 54/81    Self-reported Current Dietary Habits:  Skips breakfast   Two meals a day  Chicken twice a week  Eats process foods a few days/week   Once a days eats sweets     ETOH:   Social History     Substance and Sexual Activity   Alcohol Use Not Currently       SMART Goals:   reduced BMI to < 25, decreased body fat % <33%  (F), Improved Rate Your Plate score  >58, eat 6 or more servings of grain products per day, eat whole grain breads, brown rice and whole grain cereals, eat 5 or more servings of fruits and vegetables a day, eat 2 or more servings of low fat milk or yogurt a day, choose lean beef or rarely eat beef, rarely eat processed meats or eat low fat processed meats, eat poultry without the skin, eat chicken and fish that is not fried, eat meatless meals twice a week or more, choose 1% or skim milk, rarely eat frozen desserts or choose fruit or fat-free sweets, Do not cook with oil, butter or margarine, Do not eat fried foods, and choose light or fat-free salad dressings or [place]    Patient Specific NUTRITION GOALS:    increase water intake to reduce/thin mucus production reduced SOB while eating eat smaller, more frequent meals decrease caloric intake eat less CO2 producing foods improve hydration eat unsaturated fats and lean protein for healthy weight gain increased muscle mass      OTHER CORE COMPONENT ASSESSMENT:    Hospitalizations in the past year: 1 BLVR procedure    Oxygen needs:   Rest:  supplemental O2 via nasal cannula @ 3L/min   Exercise/physical activity:  supplemental O2 via nasal cannula @ 3L/min   Sleep:   room air    Does Pt monitor home SpO2? yes   Average SpO2 at rest:  94-99%   Average SpO2 with ADLs/physical activity:  88-95%      Use of Rescue Inhaler: Yes:  3-4 times " per days    Use of Maintenance Inhaler: Yes:  2 times per day    Use of Nebulizer Treatments:  Yes:  2 times per day    Patient practices breathing techniques at home:  Yes    CAD Risk Factors:  Cholesterol: Yes  HTN: No  DM: Type 2   Obesity: Yes   Smoking: Former user    SMART Core Component Goals:   consistent, controlled resting BP < 130/80, medication compliance, and abstain from smoking    Patient Specific CORE COMPONENT GOALS:    reduced dietary sodium <2000mg, medication compliance, Abstain from smoking, compliance with supplemental oxygen, and monitor home pulse oximetry      INDIVIDUALIZED TREATMENT PLAN      EXERCISE GOALS AND PLAN    PHYSCIAN PRESCRIBED EXERCISE    Current Aerobic Exercise Prescription       Frequency: 2 days/week   Supplement with home exercise 2+ days/wk as tolerated        Minutes: 30-35         METS: 1.6-2.0              SpO2: 87-96% on Supplemental O2 4 L/min via NC              RPD:  0-7/10                     HR: 119-127   RPE: 3-4        Modalities: UBE, NuStep, and Room walking      Aerobic Exercise Prescription Plan for Progression:    Frequency: 2 days/week    Supplement with home exercise 2+ days/wk as tolerated       Minutes: 35-45        METS: 35-45              SpO2: >88% on supplemental O2 4L/min via NC               RPD: 4-6                      HR:RHR +30-40bpm   RPE: 4-5        Modalities: Treadmill, UBE, NuStep, Recumbent bike, and Room walking        Supplemental Oxygen Needs with Exercise:  supplemental O2 4L/min via nasal canula.    Strength trainin-3 days / week  12-15 repetitions  1-2 sets per modality   Will be added following 2-3 weeks of monitored exercise sessions   Modalities: Chest Press, Pull Downs, Lateral Raise, and Chair Squats    Education: pursed lipped breathing, diaphragmatic breathing, fall prevention while using nasal canula tubing, relaxation breathing, home exercise guidelines, RPE scale, RPD scale, and education class: Exercise For The  "Pulmonary Patient    Progress toward Exercise Goals:    Pt is progressing and showing improvement  toward the following goals:  back to attending rehab following BLVR procedure, slow progression, continues to do light body exercises at home, and SpO2 >88% on supplemental O2 with exercise.  , Pt has not made progress toward the following goals: still using supplemental O2 following procedure - pt slightly upset about that. , Will continue to educate and progress as tolerated.    Exercise Plan:  Titrate supplemental oxygen as needed to maintain SpO2>88% with exercise, learn to conserve energy with ADLs , practice diaphragmatic breathing, reduce time sitting at home, increased strength of respiratory muscles, utilize PLB with physical activity, begin a home exercise program , and After discharge patient will continue to follow a regular exercise regimen with the goal of a minimum of 150 minutes per week of moderate intensity exercise.      Readiness to change: Preparation:  (Getting ready to change)       NUTRITION GOALS AND PLAN    Progress toward Nutritional goals:    Pt is progressing and showing improvement  toward the following goals:  small weight loss.  , Pt has not made progress toward the following goals: does eat processed foods a few days/weekl. , Will continue to educate and progress as tolerated.    Nutrition Plan: group class: Reading Food Labels, reduce intake and /or  rarely eat processed meats , drink/use 1%  low fat or skim  milk, eat reduced-fat or part-skim cheese or rarely eat, rarely eat frozen desserts, cook without fats or oils, never/rarely eat fried foods, use \"light\" tub margarine, use light or fat-free salad dressings and mayonnaise, eat healthy snacks like fruit, pretzels, and low fat crackers, choose desserts such as fruit, simon food cake, low-fat or fat-free sweets, never/rarely add salt to food when cooking or at the table, rarely/never eat salty snacks, choose low sugar desserts and " sweets, drink less than 8oz of non-diet soda, punch etc. per day, drink no more than 1-2 alcoholic drinks in a day, decrease carbonated beverages, and decrease gas producing foods    SMART goals are based Rate Your Plate Dietary Self-Assessment. These are the areas in which the patient could score higher on the assessment.  Goals include recommendations for a heart healthy diet based on American Heart Association.    Nutrition Education: heart healthy eating principles  weight loss and management strategies  healthy choices while dining out  portion control  group class: Heart Healthy Eating    Readiness to change: Preparation:  (Getting ready to change)       PSYCHOSOCIAL GOALS AND PLAN    Information to begin using [employer] was provided as well as contact information for counseling through  [facility].    Progress toward Psychosocial goals:    Pt is progressing and showing improvement  toward the following goals:  good overall social support and denies depression and anxiety.  , Will continue to educate and progress as tolerated.    Psychosocial Plan: Enroll in [employer], Exercise, Spend time outdoors, Keep a positive mindset, puzzles, Enjoy family, and Avoid triggers    Psychosocial Education: signs/sxs of depression, benefits of a positive support system, stress management techniques, benefits of enrolling in [place], and class:  Stress and Pulmonary Disease    Readiness to change: Action:  (Changing behavior)      OTHER CORE COMPONENTS GOALS AND PLAN    Tobacco Use Intervention:     N/A:  Patient is a non-smoker   Pt quit 2016   and has abstained    Oxygen Goal: Maintain SpO2>88% during exercise or as advised by pulmonolgist    Progress toward Core Component goals:    Pt has not made progress toward the following goals: blood pressures stable, medication/oxygen compliance, and SpO2 >88% . , Will continue to educate and progress as tolerated.    Core Component Plan: group class:  Understanding Heart Disease, group class: Common Heart Medications, medication compliance, complete abstention from smoking, avoid places with second hand smoke, avoid processed foods, engage in regular exercise, eliminate salt shaker at the table, check labels for sodium content, and monitor home BP    Core Component Education:  pathophysiology of pulmonary disease, tobacco triggers, setting a smoking cessation plan, relapse education, control coughing, inspiratory muscle training, environmental triggers, nebulizer use, and bronchodilators    Readiness to change: Action:  (Changing behavior)

## 2025-06-03 LAB
MYCOBACTERIUM SPEC CULT: NORMAL
RHODAMINE-AURAMINE STN SPEC: NORMAL

## 2025-06-04 ENCOUNTER — CLINICAL SUPPORT (OUTPATIENT)
Dept: PULMONOLOGY | Facility: CLINIC | Age: 75
End: 2025-06-04
Attending: INTERNAL MEDICINE
Payer: MEDICARE

## 2025-06-04 DIAGNOSIS — J43.2 CENTRILOBULAR EMPHYSEMA (HCC): Primary | ICD-10-CM

## 2025-06-04 PROCEDURE — 94625 PHY/QHP OP PULM RHB W/O MNTR: CPT

## 2025-06-09 ENCOUNTER — APPOINTMENT (OUTPATIENT)
Dept: RADIOLOGY | Facility: HOSPITAL | Age: 75
End: 2025-06-09
Payer: MEDICARE

## 2025-06-09 ENCOUNTER — CLINICAL SUPPORT (OUTPATIENT)
Dept: PULMONOLOGY | Facility: CLINIC | Age: 75
End: 2025-06-09
Attending: INTERNAL MEDICINE
Payer: MEDICARE

## 2025-06-09 ENCOUNTER — HOSPITAL ENCOUNTER (OUTPATIENT)
Dept: CT IMAGING | Facility: HOSPITAL | Age: 75
Discharge: HOME | End: 2025-06-09
Attending: INTERNAL MEDICINE
Payer: MEDICARE

## 2025-06-09 DIAGNOSIS — J44.9 COPD, SEVERE (HCC): ICD-10-CM

## 2025-06-09 DIAGNOSIS — J43.2 CENTRILOBULAR EMPHYSEMA (HCC): Primary | ICD-10-CM

## 2025-06-09 PROCEDURE — 71250 CT THORAX DX C-: CPT

## 2025-06-09 PROCEDURE — 94625 PHY/QHP OP PULM RHB W/O MNTR: CPT

## 2025-06-10 ENCOUNTER — HOSPITAL ENCOUNTER (OUTPATIENT)
Dept: PULMONOLOGY | Facility: HOSPITAL | Age: 75
Discharge: HOME | End: 2025-06-10
Attending: INTERNAL MEDICINE
Payer: MEDICARE

## 2025-06-10 ENCOUNTER — APPOINTMENT (OUTPATIENT)
Dept: CT IMAGING | Facility: HOSPITAL | Age: 75
End: 2025-06-10
Attending: INTERNAL MEDICINE
Payer: MEDICARE

## 2025-06-10 DIAGNOSIS — J44.9 COPD, SEVERE (HCC): ICD-10-CM

## 2025-06-10 LAB
MYCOBACTERIUM SPEC CULT: NORMAL
RHODAMINE-AURAMINE STN SPEC: NORMAL

## 2025-06-10 PROCEDURE — 94760 N-INVAS EAR/PLS OXIMETRY 1: CPT

## 2025-06-10 PROCEDURE — 94618 PULMONARY STRESS TESTING: CPT | Performed by: INTERNAL MEDICINE

## 2025-06-10 PROCEDURE — 94761 N-INVAS EAR/PLS OXIMETRY MLT: CPT

## 2025-06-10 PROCEDURE — 94060 EVALUATION OF WHEEZING: CPT

## 2025-06-10 PROCEDURE — 94060 EVALUATION OF WHEEZING: CPT | Performed by: INTERNAL MEDICINE

## 2025-06-10 PROCEDURE — 94729 DIFFUSING CAPACITY: CPT | Performed by: INTERNAL MEDICINE

## 2025-06-10 PROCEDURE — 94729 DIFFUSING CAPACITY: CPT

## 2025-06-10 PROCEDURE — 94726 PLETHYSMOGRAPHY LUNG VOLUMES: CPT | Performed by: INTERNAL MEDICINE

## 2025-06-10 PROCEDURE — 94726 PLETHYSMOGRAPHY LUNG VOLUMES: CPT

## 2025-06-10 RX ORDER — ALBUTEROL SULFATE 0.83 MG/ML
2.5 SOLUTION RESPIRATORY (INHALATION) ONCE
Status: COMPLETED | OUTPATIENT
Start: 2025-06-10 | End: 2025-06-10

## 2025-06-10 RX ADMIN — ALBUTEROL SULFATE 2.5 MG: 2.5 SOLUTION RESPIRATORY (INHALATION) at 10:01

## 2025-06-11 ENCOUNTER — OFFICE VISIT (OUTPATIENT)
Dept: PULMONOLOGY | Facility: CLINIC | Age: 75
End: 2025-06-11
Payer: MEDICARE

## 2025-06-11 ENCOUNTER — CLINICAL SUPPORT (OUTPATIENT)
Dept: PULMONOLOGY | Facility: CLINIC | Age: 75
End: 2025-06-11
Attending: INTERNAL MEDICINE
Payer: MEDICARE

## 2025-06-11 VITALS
DIASTOLIC BLOOD PRESSURE: 70 MMHG | HEART RATE: 74 BPM | OXYGEN SATURATION: 100 % | BODY MASS INDEX: 27.83 KG/M2 | TEMPERATURE: 98.3 F | SYSTOLIC BLOOD PRESSURE: 122 MMHG | WEIGHT: 137.8 LBS

## 2025-06-11 DIAGNOSIS — J96.11 CHRONIC HYPOXIC RESPIRATORY FAILURE (HCC): ICD-10-CM

## 2025-06-11 DIAGNOSIS — J44.9 COPD, SEVERE (HCC): Primary | Chronic | ICD-10-CM

## 2025-06-11 DIAGNOSIS — J43.2 CENTRILOBULAR EMPHYSEMA (HCC): Primary | ICD-10-CM

## 2025-06-11 PROCEDURE — 99214 OFFICE O/P EST MOD 30 MIN: CPT | Performed by: INTERNAL MEDICINE

## 2025-06-11 PROCEDURE — 94625 PHY/QHP OP PULM RHB W/O MNTR: CPT

## 2025-06-11 NOTE — ASSESSMENT & PLAN NOTE
Oxygenation needs as above.  She will continue to use O2 to maintain saturations above 88% at all times.  We will continue to monitor her progress.  I explained that prior to valves, we determined that 17% of her total blood volume was entering the left upper lobe and it will sometimes take several weeks to months for the VQ matching to improve.  Hopefully we will see ongoing improvement in her oxygen needs.  Having said that, I do not expect her to be completely free from oxygen, recognizing that she still has severe underlying lung disease despite having the valves in place

## 2025-06-11 NOTE — LETTER
2025     Stiven Thompson MD  [address]  [address]  [facility] [address]    Patient: Susanna Guevara   YOB: 1950   Date of Visit: 2025       Dear MD Tangela Kenyon DO:    Thank you for referring Susanna Guevara to me for evaluation. Below are my notes for this consultation.    If you have questions, please do not hesitate to call me. I look forward to following your patient along with you.         Sincerely,        Eliane Jimenez DO        CC: DO Eliane Loco DO  2025  3:20 PM  Signed  Follow-up  Visit - Pulmonary Medicine   Name: Susanna Guevara      : 1950      MRN: [id number]  Encounter Provider: Eliane Jimenez DO  Encounter Date: 2025   Encounter department: [facility] [place]  :  Assessment & Plan  COPD, severe (HCC)  S/p Bronchoscopic lung volume reduction  Date of procedure: 25  Lobe(s) treated: SHIN/lingula  Number of valves: 2     2025 6/10/25 % change   FEV1, L 0.55 L 0.60 L +9%   FEV1, % pred 36 % 56 %    RV, L 3.9 L 5.26 L    RV, % pred 201 % 238 %    6MWD 234 m, 6L 324 m, 4L      Patient has had mixed response after BLVR.  On a positive note, patient has excellent radiographic response with complete left upper lobe atelectasis.  She has lower oxygen needs with exertion and showed mild improvement in prebronchodilator FEV1 (9%).  She has less rescue inhaler need and admits to having minimal improvement in terms of exertional dyspnea.  Conversely, patient recalls being able to rest on room air prior to having valves.  Having said that, on her initial 6-minute walk test, saturations were 90% on 3 L, so I suspect she was having some room air desaturation previously.     I explained that it often takes up to 6 months to achieve complete response after having valves.  I encouraged her to continue with pulmonary rehabilitation and her inhaler regimen per   Jay    Chronic hypoxic respiratory failure (HCC)  Oxygenation needs as above.  She will continue to use O2 to maintain saturations above 88% at all times.  We will continue to monitor her progress.  I explained that prior to valves, we determined that 17% of her total blood volume was entering the left upper lobe and it will sometimes take several weeks to months for the VQ matching to improve.  Hopefully we will see ongoing improvement in her oxygen needs.  Having said that, I do not expect her to be completely free from oxygen, recognizing that she still has severe underlying lung disease despite having the valves in place           Return in about 3 months (around 9/11/2025).    History of Present Illness  Susanna Guevara is a 74 y.o. female who presents for follow-up regarding severe COPD status post bronchoscopic lung volume reduction on 4/28/2025 with 2 valves in the left upper lobe/lingula.  She had an uneventful postprocedure course.  Since being home, she has had mixed response.  She perceives that she is requiring the oxygen more consistently, though admits that she does not need to use as high of a flow rate.  Prior to valves, she was able to rest without supplemental oxygen and used 6 L/min with exertion.  She is now using 4 L/min around-the-clock.  She finds that her degree of sputum production has improved since using the valves, as has her rescue inhaler therapy use.  Previously was using 6 rescue episodes per day, now down to approximately 2.  She has no significant wheezing.  She is participating in pulmonary rehabilitation.    Review of Systems    Aside from what is mentioned in the HPI, ROS is otherwise negative         Medical History Reviewed by provider this encounter:     .    Objective  /70 (BP Location: Left arm, Patient Position: Sitting, Cuff Size: Standard)   Pulse 74   Temp 98.3 °F (36.8 °C) (Temporal)   Wt 62.5 kg (137 lb 12.8 oz)   SpO2 100%   BMI 27.83 kg/m²     Physical  "Exam  Constitutional:       General: She is not in acute distress.     Appearance: Normal appearance.   HENT:      Head: Normocephalic.      Mouth/Throat:      Pharynx: No oropharyngeal exudate.     Eyes:      General: No scleral icterus.    Neck:      Vascular: No JVD.     Cardiovascular:      Rate and Rhythm: Normal rate and regular rhythm.   Pulmonary:      Breath sounds: No wheezing, rhonchi or rales.     Musculoskeletal:         General: No deformity.      Cervical back: Neck supple.   Lymphadenopathy:      Cervical: No cervical adenopathy.     Skin:     General: Skin is warm and dry.     Neurological:      Mental Status: She is alert and oriented to person, place, and time.     Psychiatric:         Mood and Affect: Mood normal.         Diagnostic Data:    Radiology results:    CT of the chest performed on 6/9/2025 is personally reviewed.  Interpretation is still pending.  There appears to be complete left upper lobe atelectasis with valves in good position    PFT/spirometry results:  No results found for: \"FEV1\", \"FVC\", \"WQX6LCM\", \"TLC\", \"DLCO\"   6/10/2025        Eliane Jimenez, DO      "

## 2025-06-11 NOTE — PROGRESS NOTES
Follow-up  Visit - Pulmonary Medicine   Name: Susanna Guevara      : 1950      MRN: [id number]  Encounter Provider: Eliane Jimenez DO  Encounter Date: 2025   Encounter department: [facility]  :  Assessment & Plan  COPD, severe (HCC)  S/p Bronchoscopic lung volume reduction  Date of procedure: 25  Lobe(s) treated: SHIN/lingula  Number of valves: 2     2025 6/10/25 % change   FEV1, L 0.55 L 0.60 L +9%   FEV1, % pred 36 % 56 %    RV, L 3.9 L 5.26 L    RV, % pred 201 % 238 %    6MWD 234 m, 6L 324 m, 4L      Patient has had mixed response after BLVR.  On a positive note, patient has excellent radiographic response with complete left upper lobe atelectasis.  She has lower oxygen needs with exertion and showed mild improvement in prebronchodilator FEV1 (9%).  She has less rescue inhaler need and admits to having minimal improvement in terms of exertional dyspnea.  Conversely, patient recalls being able to rest on room air prior to having valves.  Having said that, on her initial 6-minute walk test, saturations were 90% on 3 L, so I suspect she was having some room air desaturation previously.     I explained that it often takes up to 6 months to achieve complete response after having valves.  I encouraged her to continue with pulmonary rehabilitation and her inhaler regimen per Dr. Thompson    Chronic hypoxic respiratory failure (HCC)  Oxygenation needs as above.  She will continue to use O2 to maintain saturations above 88% at all times.  We will continue to monitor her progress.  I explained that prior to valves, we determined that 17% of her total blood volume was entering the left upper lobe and it will sometimes take several weeks to months for the VQ matching to improve.  Hopefully we will see ongoing improvement in her oxygen needs.  Having said that, I do not expect her to be completely free from oxygen, recognizing that she still has severe underlying lung  disease despite having the valves in place           Return in about 3 months (around 9/11/2025).    History of Present Illness   Susanna Guevara is a 74 y.o. female who presents for follow-up regarding severe COPD status post bronchoscopic lung volume reduction on 4/28/2025 with 2 valves in the left upper lobe/lingula.  She had an uneventful postprocedure course.  Since being home, she has had mixed response.  She perceives that she is requiring the oxygen more consistently, though admits that she does not need to use as high of a flow rate.  Prior to valves, she was able to rest without supplemental oxygen and used 6 L/min with exertion.  She is now using 4 L/min around-the-clock.  She finds that her degree of sputum production has improved since using the valves, as has her rescue inhaler therapy use.  Previously was using 6 rescue episodes per day, now down to approximately 2.  She has no significant wheezing.  She is participating in pulmonary rehabilitation.    Review of Systems    Aside from what is mentioned in the HPI, ROS is otherwise negative         Medical History Reviewed by provider this encounter:     .    Objective   /70 (BP Location: Left arm, Patient Position: Sitting, Cuff Size: Standard)   Pulse 74   Temp 98.3 °F (36.8 °C) (Temporal)   Wt 62.5 kg (137 lb 12.8 oz)   SpO2 100%   BMI 27.83 kg/m²     Physical Exam  Constitutional:       General: She is not in acute distress.     Appearance: Normal appearance.   HENT:      Head: Normocephalic.      Mouth/Throat:      Pharynx: No oropharyngeal exudate.     Eyes:      General: No scleral icterus.    Neck:      Vascular: No JVD.     Cardiovascular:      Rate and Rhythm: Normal rate and regular rhythm.   Pulmonary:      Breath sounds: No wheezing, rhonchi or rales.     Musculoskeletal:         General: No deformity.      Cervical back: Neck supple.   Lymphadenopathy:      Cervical: No cervical adenopathy.     Skin:     General: Skin is warm  "and dry.     Neurological:      Mental Status: She is alert and oriented to person, place, and time.     Psychiatric:         Mood and Affect: Mood normal.         Diagnostic Data:    Radiology results:    CT of the chest performed on 6/9/2025 is personally reviewed.  Interpretation is still pending.  There appears to be complete left upper lobe atelectasis with valves in good position    PFT/spirometry results:  No results found for: \"FEV1\", \"FVC\", \"WFI1JAW\", \"TLC\", \"DLCO\"   6/10/2025        Eliane Jimenez, DO      "

## 2025-06-11 NOTE — ASSESSMENT & PLAN NOTE
S/p Bronchoscopic lung volume reduction  Date of procedure: 4/28/25  Lobe(s) treated: SHIN/lingula  Number of valves: 2     1/2025 6/10/25 % change   FEV1, L 0.55 L 0.60 L +9%   FEV1, % pred 36 % 56 %    RV, L 3.9 L 5.26 L    RV, % pred 201 % 238 %    6MWD 234 m, 6L 324 m, 4L      Patient has had mixed response after BLVR.  On a positive note, patient has excellent radiographic response with complete left upper lobe atelectasis.  She has lower oxygen needs with exertion and showed mild improvement in prebronchodilator FEV1 (9%).  She has less rescue inhaler need and admits to having minimal improvement in terms of exertional dyspnea.  Conversely, patient recalls being able to rest on room air prior to having valves.  Having said that, on her initial 6-minute walk test, saturations were 90% on 3 L, so I suspect she was having some room air desaturation previously.     I explained that it often takes up to 6 months to achieve complete response after having valves.  I encouraged her to continue with pulmonary rehabilitation and her inhaler regimen per Dr. Thompson

## 2025-06-16 ENCOUNTER — CLINICAL SUPPORT (OUTPATIENT)
Dept: PULMONOLOGY | Facility: CLINIC | Age: 75
End: 2025-06-16
Attending: INTERNAL MEDICINE
Payer: MEDICARE

## 2025-06-16 DIAGNOSIS — J43.2 CENTRILOBULAR EMPHYSEMA (HCC): Primary | ICD-10-CM

## 2025-06-16 PROCEDURE — 94625 PHY/QHP OP PULM RHB W/O MNTR: CPT

## 2025-06-17 LAB
MYCOBACTERIUM SPEC CULT: NORMAL
RHODAMINE-AURAMINE STN SPEC: NORMAL

## 2025-06-18 ENCOUNTER — CLINICAL SUPPORT (OUTPATIENT)
Dept: PULMONOLOGY | Facility: CLINIC | Age: 75
End: 2025-06-18
Attending: INTERNAL MEDICINE
Payer: MEDICARE

## 2025-06-18 DIAGNOSIS — J43.2 CENTRILOBULAR EMPHYSEMA (HCC): Primary | ICD-10-CM

## 2025-06-18 PROCEDURE — 94625 PHY/QHP OP PULM RHB W/O MNTR: CPT

## 2025-06-21 ENCOUNTER — HOSPITAL ENCOUNTER (OUTPATIENT)
Facility: HOSPITAL | Age: 75
Discharge: HOME | End: 2025-06-21
Attending: FAMILY MEDICINE
Payer: MEDICARE

## 2025-06-21 VITALS — WEIGHT: 137 LBS | BODY MASS INDEX: 28.76 KG/M2 | HEIGHT: 58 IN

## 2025-06-21 DIAGNOSIS — Z12.31 ENCOUNTER FOR SCREENING MAMMOGRAM FOR BREAST CANCER: ICD-10-CM

## 2025-06-21 DIAGNOSIS — Z78.0 MENOPAUSE: ICD-10-CM

## 2025-06-21 PROCEDURE — 77067 SCR MAMMO BI INCL CAD: CPT

## 2025-06-21 PROCEDURE — 77063 BREAST TOMOSYNTHESIS BI: CPT

## 2025-06-21 PROCEDURE — 77080 DXA BONE DENSITY AXIAL: CPT

## 2025-06-23 ENCOUNTER — CLINICAL SUPPORT (OUTPATIENT)
Dept: PULMONOLOGY | Facility: CLINIC | Age: 75
End: 2025-06-23
Attending: INTERNAL MEDICINE
Payer: MEDICARE

## 2025-06-23 DIAGNOSIS — J43.2 CENTRILOBULAR EMPHYSEMA (HCC): Primary | ICD-10-CM

## 2025-06-23 PROCEDURE — 94625 PHY/QHP OP PULM RHB W/O MNTR: CPT

## 2025-06-25 ENCOUNTER — APPOINTMENT (OUTPATIENT)
Dept: PULMONOLOGY | Facility: CLINIC | Age: 75
End: 2025-06-25
Payer: MEDICARE

## 2025-06-30 ENCOUNTER — CLINICAL SUPPORT (OUTPATIENT)
Dept: PULMONOLOGY | Facility: CLINIC | Age: 75
End: 2025-06-30
Attending: INTERNAL MEDICINE
Payer: MEDICARE

## 2025-06-30 DIAGNOSIS — J43.2 CENTRILOBULAR EMPHYSEMA (HCC): Primary | ICD-10-CM

## 2025-06-30 PROCEDURE — 94625 PHY/QHP OP PULM RHB W/O MNTR: CPT

## 2025-06-30 NOTE — PROGRESS NOTES
Pulmonary Rehabilitation   Assessment and Individualized Treatment Plan  90 DAY REASSESSMENT      Today's date: 2025  # of Exercise Sessions Completed: 16  Patient name: Susanna Guevara     : 1950       MRN: [id number]  Referring Physician: Eliane Jimenez DO  Pulmonologist: Stiven Rocha  Provider: Chapin  Clinician: Eric Delgado MS, CEP     Dx:    Encounter Diagnosis   Name Primary?    Centrilobular emphysema (HCC) Yes     Date of onset: 25      Treatment is tailored to this patient's individual needs.  The ITP was reviewed with the patient and all questions were answered to their satisfaction.  Additional ITP documentation can be found electronically including daily and monthly exercise summaries, daily session notes with ECG summaries, education notes, daily medication reconciliation, and daily physician supervision.    90 DAY SUMMARY DATE: 2025    Resting BP  102/68 - 130/70,  HR 92 - 111  Exercise /70- 122/78.   - 147  Exercise session details:  30-40 minutes,  1.6 - 2.0 METs  LO2:   Rest:   supplemental O2 via nasal cannula @ 4L/min , Exercise:   supplemental O2 via nasal cannula @ 4L/min   SpO2: Rest:  90-95%,  Exercise:  84-96%  Dyspnea:   Rest:  0-4/10,  Exercise: 0-6/10  Home exercise/ADLs: No changes to home exercise and ADLs. Extreme heat warning has been in effect the past few days and she has not been doing much.   Patient's subjective report of progress: continued frustration with seeing no improvement really since her BLVR surgery. She still continues to cough and relying on supplemental O2 . The weather is not helping either. I continue to encourage her to stay positive.   Clinical Comments: Doing okay even with the weather being so bad. She is staying inside the air conditioner. Will continue to progress a tolerated over the next 30 days.   .  60 DAY SUMMARY DATE: 2025    Resting BP  100/64 - 114/68,  HR 90 - 111  Exercise BP  104/62- 112/72.   - 127  Exercise session details:  30-35 minutes,  1.6 - 2.0 METs  LO2:   Rest:  4 L/min, Exercise:  4L/min  SpO2: Rest:  95-97%,  Exercise:  87-96%  Dyspnea:   Rest:  0-3/10,  Exercise: 0-7/10  Home exercise/ADLs: Continues to do some body exercises at home and back to attending rehab 2x/week. Completing light ADLs with MATOS. Continues to use continuous supplemental O2.   Patient's subjective report of progress: She reports she has noticed slight improvement in her coughing and mucus production. She still doesn't like being on supplemental O2 continuously. She sees the surgeon (Dr. Jimenez) this month.   Clinical Comments: She is back to attending 2x/week following BLVR procedure. Continues to have good and bad days. She is happy to be back and can't wait to start seeing more improvement. Will continue to progress and education as tolerated.   .    30 DAY SUMMARY DATE: May 5, 2025    Resting BP  90/68 - 122/80,  HR 72 - 95  Exercise /84- 140/62.   - 125  Exercise session details:  29-36 minutes,  1.6-2.0 METs  LO2:   Rest:  3 L/min, Exercise:  3L/min  SpO2: Rest:  92-98%,  Exercise:  89-98%  Dyspnea:   Rest:  0-1/10,  Exercise: 0-5/10  Home exercise/ADLs: Unable to assess due to absence.   Patient's subjective report of progress: Unable to assess due to absence   Clinical Comments: Patient has been out due to having her Bagley Valve surgery 4/28. She has her follow-up her appointment tomorrow. Unable to assess progression due to absence. She attended 5 sessions prior to her valve surgery.   .    INITIAL EVALUATION SUMMARY April 7, 2025    Patient's subjective report of progress/symptoms: feels fatigued as soon as she gets up to walk   Home exercise/ADLs: no formal exercise,  helps her with completing her moderate to heavy ADL's  Clinical Comments: none    Initial Fitness Assessment: 6MWT:  Resting:    Resting:  BP: 122/78  HR: 95  SpO2: 98  Dyspnea: 0  Exercise:  BP:  138/84  HR: 102-129  SpO2: 85-90%  Dyspnea: 2-6  O2 use: 3 l/min  Symptoms: SOBOE,fatigue       ASSESSMENT      Medical History:   Past Medical History:   Diagnosis Date    COPD (chronic obstructive pulmonary disease) (HCC)     Diabetes mellitus (HCC)     Hyperlipidemia     Pulmonary nodule 8/31/2017       Family History:  Family History   Problem Relation Name Age of Onset    Liver disease Mother      Substance Abuse Father      COPD Sister      Diabetes Sister      No Known Problems Sister      No Known Problems Daughter      No Known Problems Daughter      No Known Problems Son         Allergies:   Prednisone and Medrol [methylprednisolone]    Current Medications:   Current Outpatient Medications   Medication Sig Dispense Refill    albuterol (PROVENTIL HFA,VENTOLIN HFA) 90 mcg/act inhaler INHALE 2 PUFFS BY MOUTH EVERY 4 HOURS AS NEEDED FOR WHEEZING 8 g 5    Budeson-Glycopyrrol-Formoterol (Breztri Aerosphere) 160-9-4.8 MCG/ACT AERO Inhale 2 puffs 2 (two) times a day Rinse mouth after use. 5.4 g 0    Calcium 500-100 MG-UNIT CHEW Chew 1 tablet in the morning.      fluticasone-umeclidinium-vilanterol (Trelegy Ellipta) 100-62.5-25 mcg/actuation inhaler Inhale 1 puff daily Rinse mouth after use. 3 each 0    ibuprofen (MOTRIN) 600 mg tablet Take 600 mg by mouth as needed in the morning and 600 mg as needed at noon and 600 mg as needed in the evening.  .      levalbuterol (XOPENEX) 1.25 mg/3 mL nebulizer solution TAKE 1 VIAL BY NEBULIZATION EVERY 8 (EIGHT) HOURS AS NEEDED FOR WHEEZING OR SHORTNESS OF BREATH 810 mL 2    metFORMIN (GLUCOPHAGE) 500 mg tablet Take 1 tablet (500 mg total) by mouth daily with breakfast 90 tablet 1    rosuvastatin (CRESTOR) 20 MG tablet TAKE 1 TABLET BY MOUTH EVERY DAY 90 tablet 1    sodium chloride 3 % inhalation solution Take 4 mL by nebulization 3 (three) times a day 360 mL 3     No current facility-administered medications for this visit.       Physical Limitations: none    Fall Risk:  Moderate   Comments: Ambulates with a steady gait with no assist device and Denies a fall in the past 6 months    Cultural needs: none    PFT:  Yes 6/10/25    FEV1/FVC ratio of 44%   FEV1 of 56% predicted  moderate obstruction.    Medication compliance: Yes  Comments: Pt reports to be compliant with medications      Pulmonary Disease Risk Factors:  Past smoker over 8 years ago    Sleep Disorders:   obstructive sleep apnea:  CPCP/BiPAP:  no      EXERCISE ASSESSMENT:      Current Functional Status:  Occupation: unemployed  Recreation/Physical activity: enjoys walking   ADL’s:Capable of performing light ADLs only cooks,dusting   [place]: able to perform self-care tough time with stairs   Exercise:  Type: body weight exercise, Frequency: 5 days/week, Duration: 10-15 mins  Home exercise equipment: exercise bike   Other Comments: none     SMART Exercise Goals:   reduced score on CAT, improved 6MWT distance, reduced dyspnea during exercise, improved exercise tolerance based on peak METs tolerated in pulmonary rehab exercise session, SpO2 >88% during exercise, and reduced RPD at rest    Patient Specific EXERCISE GOALS:     reduced dependence on supplemental O2, reduced number of COPD exacerbations, attend pulmonary rehab regularly, decrease sitting time at home, start a walking program, begin a consistent exercise regimen , reduced pulmonary related hospital readmissions , decreased rest needed with physical activity/exercise, increased muscular strength, increased energy, increased stamina with ADLs, and more independence at home    PSYCHOSOCIAL ASSESSMENT:    Date of last assessment: 4/7/25  Depression screening:  PHQ-9 = 2    Interpretation:  1-4 = Minimal Depression  Anxiety screening:  EMIL-7 = 0    Interpretation: 0-4  = Not anxious    Pt self-report of depression and anxiety   Patient reports they are coping well with good social support and denies depression or anxiety  Patient does not have sufficient emotional  "support    Self-reported stress level:  8  Stress Management: practice Relaxation Techniques, time management, spend time outside, enjoy a hobby, spend time with family, and TV    Quality of Life Screen:  (Higher score indicates disease impact on QOL)  [facility] score: 25/40      CAT: 21/40      Shortness of breath questionnaire: 60/120    Social Support:   significant other, children, and grandchildren  Community/Social Activities: none    SMART Goals:    Social Support in [facility] Score < 3, Daily Activity in [facility] Score < 3, Pain in [facility] Score < 3, Overall Health in [facility] Score < 3, Change in Health in [facility] Score < 3 , Improved appetite control, improved concentration, and control or stop worrying    Patient Specific PSYCHOCOSOCIAL GOALS:    maintain compliance with medical therapy, begin using [employer], and spend time with family     Psychosocial Assessment as it relates to rehabilitation: Patient denies issues with his/her family or home life that may affect their rehabilitation efforts.       NUTRITION ASSESSMENT:    Initial Weight:  140  Current Weight: 137.8 lbs     Height:   Ht Readings from Last 1 Encounters:   06/21/25 4' 10\" (1.473 m)       Rate Your Plate Score: 54/81    Self-reported Current Dietary Habits:  Skips breakfast   Two meals a day  Chicken twice a week  Eats process foods a few days/week   Once a days eats sweets     ETOH:   Social History     Substance and Sexual Activity   Alcohol Use Not Currently       SMART Goals:   reduced BMI to < 25, decreased body fat % <33%  (F), Improved Rate Your Plate score  >58, eat 6 or more servings of grain products per day, eat whole grain breads, brown rice and whole grain cereals, eat 5 or more servings of fruits and vegetables a day, eat 2 or more servings of low fat milk or yogurt a day, choose lean beef or rarely eat beef, rarely eat processed meats or eat low fat processed meats, eat poultry without the skin, eat chicken " and fish that is not fried, eat meatless meals twice a week or more, choose 1% or skim milk, rarely eat frozen desserts or choose fruit or fat-free sweets, Do not cook with oil, butter or margarine, Do not eat fried foods, and choose light or fat-free salad dressings or [place]    Patient Specific NUTRITION GOALS:    increase water intake to reduce/thin mucus production reduced SOB while eating eat smaller, more frequent meals decrease caloric intake eat less CO2 producing foods improve hydration eat unsaturated fats and lean protein for healthy weight gain increased muscle mass      OTHER CORE COMPONENT ASSESSMENT:    Hospitalizations in the past year: 1 BLVR procedure    Oxygen needs:   Rest:  supplemental O2 via nasal cannula @ 3-4L/min   Exercise/physical activity:  supplemental O2 via nasal cannula @ 3-4L/min   Sleep:   room air    Does Pt monitor home SpO2? yes   Average SpO2 at rest:  94-99%   Average SpO2 with ADLs/physical activity:  88-95%      Use of Rescue Inhaler: Yes:  3-4 times per days    Use of Maintenance Inhaler: Yes:  2 times per day    Use of Nebulizer Treatments:  Yes:  2 times per day    Patient practices breathing techniques at home:  Yes    CAD Risk Factors:  Cholesterol: Yes  HTN: No  DM: Type 2   Obesity: Yes   Smoking: Former user    SMART Core Component Goals:   consistent, controlled resting BP < 130/80, medication compliance, and abstain from smoking    Patient Specific CORE COMPONENT GOALS:    reduced dietary sodium <2000mg, medication compliance, Abstain from smoking, compliance with supplemental oxygen, and monitor home pulse oximetry      INDIVIDUALIZED TREATMENT PLAN      EXERCISE GOALS AND PLAN    PHYSCIAN PRESCRIBED EXERCISE    Current Aerobic Exercise Prescription       Frequency: 2 days/week   Supplement with home exercise 2+ days/wk as tolerated        Minutes: 30-40         METS: 1.6-2.0              SpO2: 84-97% on Supplemental O2 4 L/min via NC              RPD:   0-6/10                     HR:105-147   RPE: 3-4        Modalities: UBE, NuStep, and Room walking      Aerobic Exercise Prescription Plan for Progression:    Frequency: 2 days/week    Supplement with home exercise 2+ days/wk as tolerated       Minutes: 40-45        METS: 2.1-2.5              SpO2: >88% on supplemental O2 3-4L/min via NC               RPD: 4-6                      HR:RHR +30-40bpm   RPE: 4-5        Modalities: UBE, NuStep, Recumbent bike, and Room walking        Supplemental Oxygen Needs with Exercise:  supplemental O2 4L/min via nasal canula.    Strength trainin-3 days / week  12-15 repetitions  1-2 sets per modality   Will be added following 2-3 weeks of monitored exercise sessions   Modalities: Chest Press, Pull Downs, Lateral Raise, and Chair Squats    Education: pursed lipped breathing, diaphragmatic breathing, fall prevention while using nasal canula tubing, relaxation breathing, home exercise guidelines, RPE scale, RPD scale, and education class: Exercise For The Pulmonary Patient    Progress toward Exercise Goals:    Pt is progressing and showing improvement  toward the following goals:  attending rehab 2x/week, continued  slow progression, continues to do light body exercises at home, and compliance with using supplemental O2 .  , Pt has not made progress toward the following goals: still using supplemental O2 following procedure and continues to feel like she is not improving and no increase in overall functional METs in the past 30 days - extreme heat . , Will continue to educate and progress as tolerated.    Exercise Plan:  patient will attend rehab 2-3 times per week to complete 24 exercise sessions, titrate supplemental oxygen as needed to maintain SpO2>88% with exercise, learn to conserve energy with ADLs , reduce time sitting at home, begin a home exercise program , regular attendance in pulmonary rehab, begin resistance training in rehab session, and After discharge patient will  "continue to follow a regular exercise regimen with the goal of a minimum of 150 minutes per week of moderate intensity exercise.      Readiness to change: Preparation:  (Getting ready to change)       NUTRITION GOALS AND PLAN    Progress toward Nutritional goals:    Pt is progressing and showing improvement  toward the following goals:  small weight loss.  , Pt has not made progress toward the following goals: does eat processed foods a few days/week and no changes to diet l. , Will continue to educate and progress as tolerated.    Nutrition Plan: group class: Reading Food Labels, reduce intake and /or  rarely eat processed meats , drink/use 1%  low fat or skim  milk, eat reduced-fat or part-skim cheese or rarely eat, rarely eat frozen desserts, cook without fats or oils, never/rarely eat fried foods, use \"light\" tub margarine, use light or fat-free salad dressings and mayonnaise, eat healthy snacks like fruit, pretzels, and low fat crackers, choose desserts such as fruit, simon food cake, low-fat or fat-free sweets, never/rarely add salt to food when cooking or at the table, rarely/never eat salty snacks, choose low sugar desserts and sweets, drink less than 8oz of non-diet soda, punch etc. per day, drink no more than 1-2 alcoholic drinks in a day, decrease carbonated beverages, and decrease gas producing foods    SMART goals are based Rate Your Plate Dietary Self-Assessment. These are the areas in which the patient could score higher on the assessment.  Goals include recommendations for a heart healthy diet based on American Heart Association.    Nutrition Education: heart healthy eating principles  weight loss and management strategies  healthy choices while dining out  portion control  group class: Heart Healthy Eating    Readiness to change: Preparation:  (Getting ready to change)       PSYCHOSOCIAL GOALS AND PLAN    Information to begin using [employer] was provided as well as contact information for " counseling through  [facility].    Progress toward Psychosocial goals:    Pt is progressing and showing improvement  toward the following goals:  good overall social support and denies depression and anxiety.  , Pt has not made progress toward the following goals: some frustration with ongoing cough and not feeling any important with BLVR procedure. , Will continue to educate and progress as tolerated.    Psychosocial Plan: patient introduced to [employer] and encouraged to enroll , exercise, spend time outdoors, keep a positive mindset, puzzles, enjoy family, and avoid triggers    Psychosocial Education: signs/sxs of depression, benefits of a positive support system, stress management techniques, benefits of enrolling in [employer], and class:  Stress and Pulmonary Disease    Readiness to change: Action:  (Changing behavior)      OTHER CORE COMPONENTS GOALS AND PLAN    Tobacco Use Intervention:     N/A:  Patient is a non-smoker   Pt quit 2016   and has abstained    Oxygen Goal: Maintain SpO2>88% during exercise or as advised by pulmonolgist    Progress toward Core Component goals:    Pt is progressing and showing improvement  toward the following goals:  blood pressures stable and medication/oxygen compliance. Monitoring at home .  , Pt has not made progress toward the following goals: continued cough and not doing well tolerating the increased humidity . , Will continue to educate and progress as tolerated.    Core Component Plan: monitor home BP  medication compliance  check labels for sodium content  eliminate salt shaker at the table  use salt substitutes  avoid processed foods  engage in regular exercise for BP control  abstain from smoking  avoid places with second hand smoke  Education handout: oxygen     Core Component Education:  pathophysiology of pulmonary disease, tobacco triggers, setting a smoking cessation plan, smoking relapse education, control coughing, environmental triggers, proper  nebulizer use, proper bronchodilator use, education handout: Pulmonary Anatomy and Physiology, education handout:  Pulmonary medications, education handout:  Clearing Secretions, and education handout: control breathing    Readiness to change: Action:  (Changing behavior)

## 2025-07-01 DIAGNOSIS — M81.0 OSTEOPOROSIS WITHOUT CURRENT PATHOLOGICAL FRACTURE, UNSPECIFIED OSTEOPOROSIS TYPE: Primary | ICD-10-CM

## 2025-07-02 ENCOUNTER — CLINICAL SUPPORT (OUTPATIENT)
Dept: PULMONOLOGY | Facility: CLINIC | Age: 75
End: 2025-07-02
Attending: INTERNAL MEDICINE
Payer: MEDICARE

## 2025-07-02 DIAGNOSIS — J43.2 CENTRILOBULAR EMPHYSEMA (HCC): Primary | ICD-10-CM

## 2025-07-02 PROCEDURE — 94625 PHY/QHP OP PULM RHB W/O MNTR: CPT

## 2025-07-07 ENCOUNTER — CLINICAL SUPPORT (OUTPATIENT)
Dept: PULMONOLOGY | Facility: CLINIC | Age: 75
End: 2025-07-07
Attending: INTERNAL MEDICINE
Payer: MEDICARE

## 2025-07-07 DIAGNOSIS — J43.2 CENTRILOBULAR EMPHYSEMA (HCC): Primary | ICD-10-CM

## 2025-07-07 PROCEDURE — 94625 PHY/QHP OP PULM RHB W/O MNTR: CPT

## 2025-07-09 ENCOUNTER — CLINICAL SUPPORT (OUTPATIENT)
Dept: PULMONOLOGY | Facility: CLINIC | Age: 75
End: 2025-07-09
Attending: INTERNAL MEDICINE
Payer: MEDICARE

## 2025-07-09 DIAGNOSIS — J43.2 CENTRILOBULAR EMPHYSEMA (HCC): Primary | ICD-10-CM

## 2025-07-09 PROCEDURE — 94625 PHY/QHP OP PULM RHB W/O MNTR: CPT

## 2025-07-14 ENCOUNTER — APPOINTMENT (OUTPATIENT)
Dept: RADIOLOGY | Facility: CLINIC | Age: 75
End: 2025-07-14
Payer: MEDICARE

## 2025-07-14 ENCOUNTER — OFFICE VISIT (OUTPATIENT)
Dept: URGENT CARE | Facility: CLINIC | Age: 75
End: 2025-07-14
Payer: MEDICARE

## 2025-07-14 VITALS
HEART RATE: 89 BPM | SYSTOLIC BLOOD PRESSURE: 149 MMHG | OXYGEN SATURATION: 97 % | TEMPERATURE: 98.1 F | RESPIRATION RATE: 28 BRPM | DIASTOLIC BLOOD PRESSURE: 81 MMHG

## 2025-07-14 DIAGNOSIS — M54.9 MID BACK PAIN ON RIGHT SIDE: ICD-10-CM

## 2025-07-14 DIAGNOSIS — M54.9 MID BACK PAIN ON RIGHT SIDE: Primary | ICD-10-CM

## 2025-07-14 LAB
SL AMB  POCT GLUCOSE, UA: NORMAL
SL AMB LEUKOCYTE ESTERASE,UA: NORMAL
SL AMB POCT BILIRUBIN,UA: NORMAL
SL AMB POCT BLOOD,UA: NORMAL
SL AMB POCT CLARITY,UA: CLEAR
SL AMB POCT COLOR,UA: NORMAL
SL AMB POCT KETONES,UA: NORMAL
SL AMB POCT NITRITE,UA: NORMAL
SL AMB POCT PH,UA: 6
SL AMB POCT SPECIFIC GRAVITY,UA: 1.02
SL AMB POCT URINE PROTEIN: NORMAL
SL AMB POCT UROBILINOGEN: 0.2

## 2025-07-14 PROCEDURE — G0463 HOSPITAL OUTPT CLINIC VISIT: HCPCS | Performed by: NURSE PRACTITIONER

## 2025-07-14 PROCEDURE — 71101 X-RAY EXAM UNILAT RIBS/CHEST: CPT

## 2025-07-14 PROCEDURE — 99204 OFFICE O/P NEW MOD 45 MIN: CPT | Performed by: NURSE PRACTITIONER

## 2025-07-14 PROCEDURE — 81002 URINALYSIS NONAUTO W/O SCOPE: CPT | Performed by: NURSE PRACTITIONER

## 2025-07-14 NOTE — PROGRESS NOTES
[facility]  Name: Susanna Guevara      : 1950      MRN: [id number]  Encounter Provider: TAVARES Kruse  Encounter Date: 2025   Encounter department: [facility]  :  Assessment & Plan  Mid back pain on right side    Orders:    XR ribs right w pa chest min 3 views; Future    POCT urine dip    --Suspected musculo-cartilaginous etiology, but cannot fully rule out pulmonary, renal, other at this time. Address per below.      Patient Instructions    Patient Instructions   -- nitial read of chest and rib x-ray without acute findings including fracture or pneumonia.  Will contact you with final radiology results (estimate 1 to 12 hours) if any specific findings noted.  --Rest, avoid potentially exacerbating activities  --Tylenol or Motrin as needed  --Over-the-counter lidocaine patch twice a day as needed  --Moist heat  --Follow-up with PCP as scheduled tomorrow.  Follow-up with pulmonologist for ongoing breathing issues.  Go to ER if worsening pain, shortness of breath, other immediate concerns.     If tests are performed, our office will contact you with results only if changes need to made to the care plan discussed with you at the visit. [facility]    Chief Complaint:   Chief Complaint   Patient presents with    Back Pain     States she had a PFT test on 6/10. When she woke up the next day she was having back pain. Mid right back pain. No urinary issues. Taking Motrin     History of Present Illness   Is been for complaints of right mid back pain x 1 month.  States she had PFTs performed on 6/10.  States that the test required extra pulmonary exertion, but she felt no pain afterwards.  Then she woke up the next day with pain in her right mid back.  Described as sharp.  Constant.  Increased with turning and deep breath.  No radiation elsewhere to back, chest, abdomen.  States that pain has gradually been getting worse since  then.  Rates 6/10 at present.  No relief from Motrin.  No ice or heat tried.  No known injuries or exacerbating activities and falls.  Denies fever/chills, rash, urinary changes including hematuria.  Does note increased cough above baseline over the last month.  Productive of clear sputum. Baseline dyspnea (COPD), no worse. Uses 2 L home O2 via NC at baseline.    Notes past history of osteoporosis.  Denies history of pathologic fractures.          Review of Systems   Constitutional:  Negative for fever.   Respiratory:  Positive for cough and shortness of breath.    Cardiovascular:  Negative for chest pain.   Gastrointestinal:  Negative for abdominal pain, nausea and vomiting.   Musculoskeletal:  Positive for back pain.   Skin:  Negative for rash.     Past Medical History   Past Medical History[1]  Past Surgical History[2]  Family History[3]  she reports that she quit smoking about 8 years ago. Her smoking use included cigarettes. She started smoking about 55 years ago. She has a 47 pack-year smoking history. She has never used smokeless tobacco. She reports that she does not currently use alcohol. She reports that she does not use drugs.  Current Outpatient Medications   Medication Instructions    albuterol (PROVENTIL HFA,VENTOLIN HFA) 90 mcg/act inhaler INHALE 2 PUFFS BY MOUTH EVERY 4 HOURS AS NEEDED FOR WHEEZING    Budeson-Glycopyrrol-Formoterol (Breztri Aerosphere) 160-9-4.8 MCG/ACT AERO 2 puffs, Inhalation, 2 times daily, Rinse mouth after use.    Calcium 500-100 MG-UNIT CHEW 1 tablet, Daily    fluticasone-umeclidinium-vilanterol (Trelegy Ellipta) 100-62.5-25 mcg/actuation inhaler 1 puff, Inhalation, Daily, Rinse mouth after use.    ibuprofen (MOTRIN) 600 mg, 3 times daily PRN    levalbuterol (XOPENEX) 1.25 mg/3 mL nebulizer solution TAKE 1 VIAL BY NEBULIZATION EVERY 8 (EIGHT) HOURS AS NEEDED FOR WHEEZING OR SHORTNESS OF BREATH    metFORMIN (GLUCOPHAGE) 500 mg, Oral, Daily with breakfast    rosuvastatin  "(CRESTOR) 20 mg, Oral, Daily    sodium chloride 3 % inhalation solution 4 mL, Nebulization, 3 times daily (RESP)   Allergies[4]     Objective   /81   Pulse 89   Temp 98.1 °F (36.7 °C) (Temporal)   Resp (!) 28   SpO2 97%      Physical Exam  Constitutional:       General: She is not in acute distress.     Appearance: She is not toxic-appearing or diaphoretic.   Pulmonary:      Breath sounds: Rales present. No wheezing or rhonchi.      Comments: Breathing mildly tachypneic at rest.  RR=24.  No cough noted.   Faint RLL crackles.  Normal BS otherwise.   Abdominal:      Tenderness: There is right CVA tenderness. There is no left CVA tenderness.      Comments: Mild right CVA tenderness.       Musculoskeletal:         General: Tenderness present.      Comments: TTP overlying right mid back at about T7-T11 level, extending to right side.  No visualized or palpable abnormalities including rash, swelling, deformity. No axial tenderness.    Trunk AROM decreased 25% rotation and lateral deviation to left with pain.       Neurological:      Mental Status: She is alert.         Portions of the record may have been created with voice recognition software.  Occasional wrong word or \"sound a like\" substitutions may have occurred due to the inherent limitations of voice recognition software.  Read the chart carefully and recognize, using context, where substitutions have occurred.         [1]   Past Medical History:  Diagnosis Date    COPD (chronic obstructive pulmonary disease) (HCC)     Diabetes mellitus (HCC)     Hyperlipidemia     Pulmonary nodule 8/31/2017   [2]   Past Surgical History:  Procedure Laterality Date    CARDIAC CATHETERIZATION N/A 3/13/2025    Procedure: Cardiac RHC;  Surgeon: Ramsey Turpin MD;  Location: [facility];  Service: Cardiology    HAND SURGERY      TENDON TRANSFER      Left lower arm   [3]   Family History  Problem Relation Name Age of Onset    Liver disease Mother      Substance Abuse " Father      COPD Sister      Diabetes Sister      No Known Problems Sister      No Known Problems Daughter      No Known Problems Daughter      No Known Problems Son     [4]   Allergies  Allergen Reactions    Prednisone      Facial redness and sob    Medrol [Methylprednisolone] Angioedema

## 2025-07-14 NOTE — PATIENT INSTRUCTIONS
-- nitial read of chest and rib x-ray without acute findings including fracture or pneumonia.  Will contact you with final radiology results (estimate 1 to 12 hours) if any specific findings noted.  --Rest, avoid potentially exacerbating activities  --Tylenol or Motrin as needed  --Over-the-counter lidocaine patch twice a day as needed  --Moist heat  --Follow-up with PCP as scheduled tomorrow.  Follow-up with pulmonologist for ongoing breathing issues.  Go to ER if worsening pain, shortness of breath, other immediate concerns.

## 2025-07-16 ENCOUNTER — CLINICAL SUPPORT (OUTPATIENT)
Dept: PULMONOLOGY | Facility: CLINIC | Age: 75
End: 2025-07-16
Attending: INTERNAL MEDICINE
Payer: MEDICARE

## 2025-07-16 DIAGNOSIS — J43.2 CENTRILOBULAR EMPHYSEMA (HCC): Primary | ICD-10-CM

## 2025-07-16 DIAGNOSIS — J44.9 COPD, SEVERE (HCC): Primary | ICD-10-CM

## 2025-07-16 PROCEDURE — 94625 PHY/QHP OP PULM RHB W/O MNTR: CPT

## 2025-07-16 RX ORDER — PREDNISONE 10 MG/1
TABLET ORAL
Qty: 30 TABLET | Refills: 2 | Status: CANCELLED | OUTPATIENT
Start: 2025-07-16

## 2025-07-16 NOTE — PROGRESS NOTES
See scanned exercise session detailed report   41 year old male with CAD, HLD, Nephrolithiasis, GERD, and Anxiety, s/p lithotripsy in May of 2025 due to renal calculi presents to the ER with 1 day of hematuria with clots and pelvic pain. Patient said around 8am yesterday he began having blood in his urine, showed picture of urine with clots. This has never happened before. Patient said 7/10 pain in RLQ began as well around then that was constant. He urinated throughout the day and the urine gradually cleared up but the pain did not resolve. The patient went to [facility] where a CT scan was performed and he was given pain medication which helped and improved it to 3/10. Patient was given dilaudid here and now his pain is back to 5/10. Patient urine looks normal at this time, yellow urine visualized in urinal at bedside. Patient denies fever, chills. Patient vomited once from nausea, appears to be 2/2 pain. Patient had lithotripsy 2 weeks ago, stones were not removed because urologist thought they would not be a future issue due to location. Stent was placed and removed in office a week ago. After removal patient stopped taking tamsulosin. Patient states he does not have discomfort with urination it is only in abdomen. Denies flank pain. Pain does not radiate. Patient took oxycodone for 3 days after his lithotripsy, no other medications taken on his own for abdominal pain until coming into the ER and receiving dilaudid. Patient states he has some palpitations occasionally, not new. Patient urologist is Dr. Nieto and had his procedure done here at [facility]. Of note, patient also has history of Factor IX deficiency clotting factor. He has no history of blood clots. States his aPTT is elevated at times. He visited a [facility] hematologist prior to his procedure due to his urologist recommendations. Of note, patient's pain began in the right lower quadrant but has been migrating towards the umbilicus now.     Denies chest pain, SOB, cough,  diarrhea, constipation, urinary frequency.    Hospital course :      Problem/Plan - 1:  ·  Problem: Obstructive uropathy.   ·  Plan: CT A/P: Left-sided hyperdense material seen within left lower pole calyces on the   left renal pelvis, likely related to blood products/clot. There is associated perinephric stranding and hydronephrosis suggesting associated   obstructive uropathy. Superimposed pyelonephritis/infection may appear similar.  Nonobstructing bilateral renal stones.  Urinary bladder wall thickening which may represent sequelae of chronic   outlet obstruction from prostate enlargement or cystitis. Correlate   clinically.  Multiloculated cystic structure seen along the right epicardial space.   Differential considerations include an atypical pericardial cyst or   possibly lymphatic lesion. This appears relatively stable in size   compared to 2/27/2025. MRI may be helpful for more definitive   characterization.  - Dilaudid 0.5mg IV q6, Zofran for nausea  - remain on IVF for PRICILLA  - No longer taking home tamsulosin  - hematuria resolved at this time.     Problem/Plan - 2:  ·  Problem: PRICILLA (acute kidney injury).   ·  Plan: PRICILLA in the setting of potential renal obstruction  - Baseline creatinine ~1.0, now 1.6  - IVF  - Monitor metabolic panel  - Avoid nephrotoxic medications  - Monitor renal indices  - resolved , oral hydration encouraged      Problem/Plan - 3:  ·  Problem: CAD (coronary artery disease).   ·  Plan: -Continue home rosuvastatin 20mg  -Hold aspirin in setting of hematuria  -BP Control  -DASH/TLC  -Keep K>4.0, Mg>2.0.     Problem/Plan - 4:  ·  Problem: HLD (hyperlipidemia).   ·  Plan: - Continue home statin  - F/u lipid panel.     Problem/Plan - 5:  ·  Problem: Anxiety.   ·  Plan: History of anxiety  - Continue home lexapro 10mg.     Problem/Plan - 6:  ·  Problem: GERD (gastroesophageal reflux disease).   ·  Plan: History of GERD  - Continue PPI.    T(C): 36.8 (06-08-25 @ 05:41), Max: 36.8 (06-07-25 @ 16:45)  HR: 75 (06-08-25 @ 05:41) (63 - 80)  BP: 116/67 (06-08-25 @ 05:41) (113/70 - 136/80)  RR: 18 (06-08-25 @ 05:41) (16 - 18)  SpO2: 94% (06-08-25 @ 05:41) (92% - 98%)    CONSTITUTIONAL: Well groomed, no apparent distress  EYES: PERRLA and symmetric, EOMI  ENMT: Oral mucosa with moist membranes.   NECK: Supple  RESP: No respiratory distress, no use of accessory muscles; CTA b/l  CV: RRR, +S1S2,  no JVD  GI: Soft, NT, ND, no rebound, no guarding; no palpable masses  LYMPH: No cervical LAD   MSK: Normal gait  SKIN: No rashes or ulcers noted  NEURO: CN II-XII intact; normal reflexes in upper and lower extremities, sensation intact in upper and lower extremities b/l to light touch   PSYCH: Appropriate insight/judgment; A+O x 3, mood and affect appropriate, recent/remote memory intact

## 2025-07-21 ENCOUNTER — CLINICAL SUPPORT (OUTPATIENT)
Dept: PULMONOLOGY | Facility: CLINIC | Age: 75
End: 2025-07-21
Attending: INTERNAL MEDICINE
Payer: MEDICARE

## 2025-07-21 DIAGNOSIS — J43.2 CENTRILOBULAR EMPHYSEMA (HCC): Primary | ICD-10-CM

## 2025-07-21 PROCEDURE — 94625 PHY/QHP OP PULM RHB W/O MNTR: CPT

## 2025-07-23 ENCOUNTER — CLINICAL SUPPORT (OUTPATIENT)
Dept: PULMONOLOGY | Facility: CLINIC | Age: 75
End: 2025-07-23
Attending: INTERNAL MEDICINE
Payer: MEDICARE

## 2025-07-23 DIAGNOSIS — J43.2 CENTRILOBULAR EMPHYSEMA (HCC): Primary | ICD-10-CM

## 2025-07-23 PROCEDURE — 94625 PHY/QHP OP PULM RHB W/O MNTR: CPT

## 2025-07-24 ENCOUNTER — HOSPITAL ENCOUNTER (OUTPATIENT)
Dept: CT IMAGING | Facility: HOSPITAL | Age: 75
End: 2025-07-24
Attending: INTERNAL MEDICINE
Payer: MEDICARE

## 2025-07-24 DIAGNOSIS — J44.9 COPD, SEVERE (HCC): ICD-10-CM

## 2025-07-24 PROCEDURE — 71250 CT THORAX DX C-: CPT

## 2025-07-25 ENCOUNTER — TELEPHONE (OUTPATIENT)
Dept: PULMONOLOGY | Facility: CLINIC | Age: 75
End: 2025-07-25

## 2025-07-25 DIAGNOSIS — J44.9 COPD, SEVERE (HCC): Primary | ICD-10-CM

## 2025-07-25 RX ORDER — BUDESONIDE 0.5 MG/2ML
0.5 INHALANT ORAL 2 TIMES DAILY
Qty: 120 ML | Refills: 5 | Status: SHIPPED | OUTPATIENT
Start: 2025-07-25

## 2025-07-25 RX ORDER — PROMETHAZINE HYDROCHLORIDE AND CODEINE PHOSPHATE 6.25; 1 MG/5ML; MG/5ML
5 SYRUP ORAL EVERY 4 HOURS PRN
Qty: 240 ML | Refills: 3 | Status: SHIPPED | OUTPATIENT
Start: 2025-07-25

## 2025-07-25 RX ORDER — AZITHROMYCIN 250 MG/1
250 TABLET, FILM COATED ORAL EVERY 24 HOURS
Qty: 30 TABLET | Refills: 5 | Status: SHIPPED | OUTPATIENT
Start: 2025-07-25 | End: 2026-01-21

## 2025-07-25 NOTE — PROGRESS NOTES
Patient reports persistent coughing and shortness of breath despite using Breztri twice daily and Xopenex via nebulizer 3 times daily.  She reports copious amounts of clear sputum.  CT of the chest was performed today.  Radiology interpretation is pending, however per my read, valves are all in good position with complete left upper lobe atelectasis.  I do not see any overt infiltrates.  In an effort to reduce ongoing inflammation, I will add budesonide via the nebulizer twice daily, azithromycin 250 mg daily and Phenergan with codeine as needed for the cough.  Unfortunately, she is unable to tolerate systemic steroids

## 2025-07-28 ENCOUNTER — APPOINTMENT (OUTPATIENT)
Dept: PULMONOLOGY | Facility: CLINIC | Age: 75
End: 2025-07-28
Attending: INTERNAL MEDICINE
Payer: MEDICARE

## 2025-07-28 NOTE — PROGRESS NOTES
Pulmonary Rehabilitation   Assessment and Individualized Treatment Plan  120 DAY REASSESSMENT      Today's date: 2025  # of Exercise Sessions Completed: 22  Patient name: Susanna Guevara     : 1950       MRN: [id number]  Referring Physician: Eliane Jimenez DO  Pulmonologist: Stiven Rocha  Provider: [place]  Clinician: Eric Delgado MS, CEP     Dx:    Encounter Diagnosis   Name Primary?    Centrilobular emphysema (HCC) Yes     Date of onset: 25      Treatment is tailored to this patient's individual needs.  The ITP was reviewed with the patient and all questions were answered to their satisfaction.  Additional ITP documentation can be found electronically including daily and monthly exercise summaries, daily session notes with ECG summaries, education notes, daily medication reconciliation, and daily physician supervision.      120 DAY REASSESSMENT SUMMARY   DATE:  2025    See ITP below for further details including patient goals and plan of care for progression.      Resting BP  96/76 - 130/82,  HR 72 - 105  Exercise /76- 130/76.   - 122  Exercise session details:  30-38 minutes,  1.6 - 2.0 METs  LO2:   Rest:   supplemental O2 via nasal cannula @ 4L/min , Exercise:   supplemental O2 via nasal cannula @ 4L/min   SpO2: Rest:  93-95%,  Exercise:  90-98%  Dyspnea:   Rest:  0/10,  Exercise: 0-7/10  Home exercise/ADLs: Unable to assess due to absence.  Patient's subjective report of progress: She has been reporting increased SOB and ongoing cough. Went to Urgent Care to the severity of her cough and causing bruising to her ribs. Recent CT scan showed that the valves are still where they should be. Dr. Jimenez added some medications to reduce inflammation in hopes to calm down her cough. Unable to assess further assessment due to absence.   Clinical Comments: Will be extending her sessions due to medical necessity in hopes her symptoms reduce and can increase  workloads and durations. She is not ready for independent exercise yet and will continue to educate, work on symptom reduce, and start to increase workloads and durations.   .  90 DAY SUMMARY DATE: 06/30/2025    Resting BP  102/68 - 130/70,  HR 92 - 111  Exercise /70- 122/78.   - 147  Exercise session details:  30-40 minutes,  1.6 - 2.0 METs  LO2:   Rest:   supplemental O2 via nasal cannula @ 4L/min , Exercise:   supplemental O2 via nasal cannula @ 4L/min   SpO2: Rest:  90-95%,  Exercise:  84-96%  Dyspnea:   Rest:  0-4/10,  Exercise: 0-6/10  Home exercise/ADLs: No changes to home exercise and ADLs. Extreme heat warning has been in effect the past few days and she has not been doing much.   Patient's subjective report of progress: continued frustration with seeing no improvement really since her BLVR surgery. She still continues to cough and relying on supplemental O2 24/7. The weather is not helping either. I continue to encourage her to stay positive.   Clinical Comments: Doing okay even with the weather being so bad. She is staying inside the air conditioner. Will continue to progress a tolerated over the next 30 days.   .  60 DAY SUMMARY DATE: 6/2/2025    Resting BP  100/64 - 114/68,  HR 90 - 111  Exercise /62- 112/72.   - 127  Exercise session details:  30-35 minutes,  1.6 - 2.0 METs  LO2:   Rest:  4 L/min, Exercise:  4L/min  SpO2: Rest:  95-97%,  Exercise:  87-96%  Dyspnea:   Rest:  0-3/10,  Exercise: 0-7/10  Home exercise/ADLs: Continues to do some body exercises at home and back to attending rehab 2x/week. Completing light ADLs with MATOS. Continues to use continuous supplemental O2.   Patient's subjective report of progress: She reports she has noticed slight improvement in her coughing and mucus production. She still doesn't like being on supplemental O2 continuously. She sees the surgeon (Dr. Jimenez) this month.   Clinical Comments: She is back to attending 2x/week following BLVR  procedure. Continues to have good and bad days. She is happy to be back and can't wait to start seeing more improvement. Will continue to progress and education as tolerated.   .    30 DAY SUMMARY DATE: May 5, 2025    Resting BP  90/68 - 122/80,  HR 72 - 95  Exercise /84- 140/62.   - 125  Exercise session details:  29-36 minutes,  1.6-2.0 METs  LO2:   Rest:  3 L/min, Exercise:  3L/min  SpO2: Rest:  92-98%,  Exercise:  89-98%  Dyspnea:   Rest:  0-1/10,  Exercise: 0-5/10  Home exercise/ADLs: Unable to assess due to absence.   Patient's subjective report of progress: Unable to assess due to absence   Clinical Comments: Patient has been out due to having her [place] Valve surgery 4/28. She has her follow-up her appointment tomorrow. Unable to assess progression due to absence. She attended 5 sessions prior to her valve surgery.   .    INITIAL EVALUATION SUMMARY April 7, 2025    Patient's subjective report of progress/symptoms: feels fatigued as soon as she gets up to walk   Home exercise/ADLs: no formal exercise,  helps her with completing her moderate to heavy ADL's  Clinical Comments: none    Initial Fitness Assessment: 6MWT:  Resting:    Resting:  BP: 122/78  HR: 95  SpO2: 98  Dyspnea: 0  Exercise:  BP: 138/84  HR: 102-129  SpO2: 85-90%  Dyspnea: 2-6  O2 use: 3 l/min  Symptoms: SOBOE,fatigue       ASSESSMENT      Medical History:   Past Medical History:   Diagnosis Date    COPD (chronic obstructive pulmonary disease) (HCC)     Diabetes mellitus (HCC)     Hyperlipidemia     Pulmonary nodule 8/31/2017       Family History:  Family History   Problem Relation Name Age of Onset    Liver disease Mother      Substance Abuse Father      COPD Sister      Diabetes Sister      No Known Problems Sister      No Known Problems Daughter      No Known Problems Daughter      No Known Problems Son         Allergies:   Prednisone and Medrol [methylprednisolone]    Current Medications:   Current Outpatient Medications    Medication Sig Dispense Refill    albuterol (PROVENTIL HFA,VENTOLIN HFA) 90 mcg/act inhaler INHALE 2 PUFFS BY MOUTH EVERY 4 HOURS AS NEEDED FOR WHEEZING 8 g 5    azithromycin (ZITHROMAX) 250 mg tablet Take 1 tablet (250 mg total) by mouth every 24 hours 30 tablet 5    Budeson-Glycopyrrol-Formoterol (Breztri Aerosphere) 160-9-4.8 MCG/ACT AERO Inhale 2 puffs 2 (two) times a day Rinse mouth after use. 5.4 g 0    budesonide (Pulmicort) 0.5 mg/2 mL nebulizer solution Take 2 mL (0.5 mg total) by nebulization 2 (two) times a day Rinse mouth after use. 120 mL 5    Calcium 500-100 MG-UNIT CHEW Chew 1 tablet in the morning.      fluticasone-umeclidinium-vilanterol (Trelegy Ellipta) 100-62.5-25 mcg/actuation inhaler Inhale 1 puff daily Rinse mouth after use. 3 each 0    ibuprofen (MOTRIN) 600 mg tablet Take 600 mg by mouth as needed in the morning and 600 mg as needed at noon and 600 mg as needed in the evening.  .      levalbuterol (XOPENEX) 1.25 mg/3 mL nebulizer solution TAKE 1 VIAL BY NEBULIZATION EVERY 8 (EIGHT) HOURS AS NEEDED FOR WHEEZING OR SHORTNESS OF BREATH 810 mL 2    metFORMIN (GLUCOPHAGE) 500 mg tablet Take 1 tablet (500 mg total) by mouth daily with breakfast 90 tablet 1    promethazine-codeine (PHENERGAN WITH CODEINE) 6.25-10 mg/5 mL syrup Take 5 mL by mouth every 4 (four) hours as needed for cough 240 mL 3    rosuvastatin (CRESTOR) 20 MG tablet TAKE 1 TABLET BY MOUTH EVERY DAY 90 tablet 1    sodium chloride 3 % inhalation solution Take 4 mL by nebulization 3 (three) times a day 360 mL 3     No current facility-administered medications for this visit.       Physical Limitations: none    Fall Risk: Moderate   Comments: Ambulates with a steady gait with no assist device and Denies a fall in the past 6 months    Cultural needs: none    PFT:  Yes 6/10/25    FEV1/FVC ratio of 44%   FEV1 of 56% predicted  moderate obstruction.    Medication compliance: Yes  Comments: Pt reports to be compliant with  medications      Pulmonary Disease Risk Factors:  Past smoker over 8 years ago    Sleep Disorders:   obstructive sleep apnea:  CPCP/BiPAP:  no      EXERCISE ASSESSMENT:      Current Functional Status:  Occupation: unemployed  Recreation/Physical activity: enjoys walking   ADL’s:Capable of performing light ADLs only cooks,dusting   [place]: able to perform self-care tough time with stairs   Exercise:  Type: body weight exercise, Frequency: 5 days/week, Duration: 10-15 mins  Home exercise equipment: exercise bike   Other Comments: none     SMART Exercise Goals:   reduced score on CAT, improved 6MWT distance, reduced dyspnea during exercise, improved exercise tolerance based on peak METs tolerated in pulmonary rehab exercise session, SpO2 >88% during exercise, and reduced RPD at rest    Patient Specific EXERCISE GOALS:     reduced dependence on supplemental O2, reduced number of COPD exacerbations, attend pulmonary rehab regularly, decrease sitting time at home, start a walking program, begin a consistent exercise regimen , reduced pulmonary related hospital readmissions , decreased rest needed with physical activity/exercise, increased muscular strength, increased energy, increased stamina with ADLs, and more independence at home    PSYCHOSOCIAL ASSESSMENT:    Date of last assessment: 4/7/25  Depression screening:  PHQ-9 = 2    Interpretation:  1-4 = Minimal Depression  Anxiety screening:  EMIL-7 = 0    Interpretation: 0-4  = Not anxious    Pt self-report of depression and anxiety   Patient reports they are coping well with good social support and denies depression or anxiety  Patient does not have sufficient emotional support    Self-reported stress level:  8  Stress Management: practice Relaxation Techniques, time management, spend time outside, enjoy a hobby, spend time with family, and TV    Quality of Life Screen:  (Higher score indicates disease impact on QOL)  [facility] COOP score: 25/40      CAT: 21/40    "   Shortness of breath questionnaire: 60/120    Social Support:   significant other, children, and grandchildren  Community/Social Activities: none    SMART Goals:    Social Support in [facility] Score < 3, Daily Activity in [place] Score < 3, Pain in [place] Score < 3, Overall Health in [place] Score < 3, Change in Health in [place] Score < 3 , Improved appetite control, improved concentration, and control or stop worrying    Patient Specific PSYCHOCOSOCIAL GOALS:    maintain compliance with medical therapy, begin using [employer], and spend time with family     Psychosocial Assessment as it relates to rehabilitation: Patient denies issues with his/her family or home life that may affect their rehabilitation efforts.       NUTRITION ASSESSMENT:    Initial Weight:  140  Current Weight: 137.2 lbs     Height:   Ht Readings from Last 1 Encounters:   06/21/25 4' 10\" (1.473 m)       Rate Your Plate Score: 54/81    Self-reported Current Dietary Habits:  Skips breakfast   Two meals a day  Chicken twice a week  Eats process foods a few days/week   Once a days eats sweets     ETOH:   Social History     Substance and Sexual Activity   Alcohol Use Not Currently       SMART Goals:   reduced BMI to < 25, decreased body fat % <33%  (F), Improved Rate Your Plate score  >58, eat 6 or more servings of grain products per day, eat whole grain breads, brown rice and whole grain cereals, eat 5 or more servings of fruits and vegetables a day, eat 2 or more servings of low fat milk or yogurt a day, choose lean beef or rarely eat beef, rarely eat processed meats or eat low fat processed meats, eat poultry without the skin, eat chicken and fish that is not fried, eat meatless meals twice a week or more, choose 1% or skim milk, rarely eat frozen desserts or choose fruit or fat-free sweets, Do not cook with oil, butter or margarine, Do not eat fried foods, and choose light or fat-free salad dressings or [place]    Patient Specific " NUTRITION GOALS:    increase water intake to reduce/thin mucus production reduced SOB while eating eat smaller, more frequent meals decrease caloric intake eat less CO2 producing foods improve hydration eat unsaturated fats and lean protein for healthy weight gain increased muscle mass      OTHER CORE COMPONENT ASSESSMENT:    Hospitalizations in the past year: 1 BLVR procedure    Oxygen needs:   Rest:  supplemental O2 via nasal cannula @ 3-4L/min   Exercise/physical activity:  supplemental O2 via nasal cannula @ 3-4L/min   Sleep:   room air    Does Pt monitor home SpO2? yes   Average SpO2 at rest:  94-99%   Average SpO2 with ADLs/physical activity:  88-95%      Use of Rescue Inhaler: Yes:  3-4 times per days    Use of Maintenance Inhaler: Yes:  2 times per day    Use of Nebulizer Treatments:  Yes:  2 times per day    Patient practices breathing techniques at home:  Yes    CAD Risk Factors:  Cholesterol: Yes  HTN: No  DM: Type 2   Obesity: Yes   Smoking: Former user    SMART Core Component Goals:   consistent, controlled resting BP < 130/80, medication compliance, and abstain from smoking    Patient Specific CORE COMPONENT GOALS:    reduced dietary sodium <2000mg, medication compliance, Abstain from smoking, compliance with supplemental oxygen, and monitor home pulse oximetry      INDIVIDUALIZED TREATMENT PLAN      EXERCISE GOALS AND PLAN    PHYSCIAN PRESCRIBED EXERCISE    Current Aerobic Exercise Prescription       Frequency: 2 days/week   Supplement with home exercise 2+ days/wk as tolerated        Minutes: 30-38         METS: 1.6-2.0              SpO2: 90-97% on Supplemental O2 4 L/min via NC              RPD:  0-7/10                     HR: 106-122   RPE: 3-4        Modalities: UBE, NuStep, and Room walking      Aerobic Exercise Prescription Plan for Progression:    Frequency: 2 days/week    Supplement with home exercise 2+ days/wk as tolerated       Minutes: 40-45        METS: 2.1-2.5              SpO2: >88% on  supplemental O2 3-4L/min via NC               RPD: 4-6                      HR:RHR +30-40bpm   RPE: 4-5        Modalities: UBE, NuStep, Recumbent bike, and Room walking        Supplemental Oxygen Needs with Exercise:  supplemental O2 4L/min via nasal canula.    Strength trainin-3 days / week  12-15 repetitions  1-2 sets per modality   Will be added following 2-3 weeks of monitored exercise sessions   Modalities: Chest Press, Pull Downs, Lateral Raise, and Chair Squats    Education: pursed lipped breathing, diaphragmatic breathing, fall prevention while using nasal canula tubing, relaxation breathing, home exercise guidelines, RPE scale, RPD scale, and education class: Exercise For The Pulmonary Patient    Progress toward Exercise Goals:    Pt is progressing and showing improvement  toward the following goals:  attending rehab 2x/week, continues to do light body exercises at home, and compliance with using supplemental O2 .  , Pt has not made progress toward the following goals: still using supplemental O2 following procedure and continues to feel like she is not improving and no increase in overall functional METs in the past 30 days - extreme heat/increased symptoms. Unable to assess further due to absence. . , Will continue to educate and progress as tolerated., Hope to start reducing supplemental O2 from 4L to 3L.     Exercise Plan:  patient will attend rehab 2-3 times per week to complete 24 exercise sessions, titrate supplemental oxygen as needed to maintain SpO2>88% with exercise, learn to conserve energy with ADLs , reduce time sitting at home, begin a home exercise program , regular attendance in pulmonary rehab, begin resistance training in rehab session, and After discharge patient will continue to follow a regular exercise regimen with the goal of a minimum of 150 minutes per week of moderate intensity exercise.      Readiness to change: Preparation:  (Getting ready to change)       NUTRITION GOALS  "AND PLAN    Progress toward Nutritional goals:    Pt has not made progress toward the following goals: does eat processed foods a few days/week and no changes to diet. Unable to assess due to absence.l. , Will continue to educate and progress as tolerated.    Nutrition Plan: reduce intake and /or  rarely eat processed meats   drink/use 1%  low fat or skim  milk  eat reduced-fat or part-skim cheese or rarely eat  rarely eat frozen desserts  cook without fats or oils  never/rarely eat fried foods  use \"light\" tub margarine  use light or fat-free salad dressings and mayonnaise  eat healthy snacks like fruit, pretzels, and low fat crackers  choose desserts such as fruit, simon food cake, low-fat or fat-free sweets  never/rarely add salt to food when cooking or at the table  rarely/never eat salty snacks  choose low sugar desserts and sweets  drink less than 8oz of non-diet soda, punch etc. per day  drink no more than 1-2 alcoholic drinks in a day  decrease carbonated beverages  decrease gas producing foods    SMART goals are based Rate Your Plate Dietary Self-Assessment. These are the areas in which the patient could score higher on the assessment.  Goals include recommendations for a heart healthy diet based on American Heart Association.    Nutrition Education: weight loss strategies  healthy choices while dining out  portion control  education: Heart Healthy Eating  education:  Label Reading    Readiness to change: Preparation:  (Getting ready to change)       PSYCHOSOCIAL GOALS AND PLAN    Information to begin using [employer] was provided as well as contact information for counseling through  [facility].    Progress toward Psychosocial goals:    Pt is progressing and showing improvement  toward the following goals:  good overall social support and denies depression and anxiety.  , Pt has not made progress toward the following goals: some frustration with ongoing cough and not feeling any important with " BLVR procedure. Unable to assess further progression due to absence. , Will continue to educate and progress as tolerated.    Psychosocial Plan: patient introduced to [employer] and encouraged to enroll , exercise, spend time outdoors, keep a positive mindset, puzzles, enjoy family, and avoid triggers    Psychosocial Education: signs/sxs of depression, benefits of a positive support system, stress management techniques, benefits of enrolling in [employer], and class:  Stress and Pulmonary Disease    Readiness to change: Action:  (Changing behavior)      OTHER CORE COMPONENTS GOALS AND PLAN    Tobacco Use Intervention:     N/A:  Patient is a non-smoker   Pt quit 2016   and has abstained    Oxygen Goal: Maintain SpO2>88% during exercise or as advised by pulmonolgist    Progress toward Core Component goals:    Pt is progressing and showing improvement  toward the following goals:  blood pressures stable and medication/oxygen compliance. Monitoring at home .  , Pt has not made progress toward the following goals: continued cough and not doing well tolerating the increased humidity. Unable to assess further progression due to absence.. , Will continue to educate and progress as tolerated.    Core Component Plan: monitor home BP  medication compliance  check labels for sodium content  eliminate salt shaker at the table  use salt substitutes  avoid processed foods  engage in regular exercise for BP control  abstain from smoking  avoid places with second hand smoke  Education handout: oxygen     Core Component Education:  pathophysiology of pulmonary disease, tobacco triggers, setting a smoking cessation plan, smoking relapse education, control coughing, environmental triggers, proper nebulizer use, proper bronchodilator use, education handout: Pulmonary Anatomy and Physiology, education handout:  Pulmonary medications, education handout:  Clearing Secretions, and education handout: control breathing    Readiness to  change: Action:  (Changing behavior)

## 2025-07-30 ENCOUNTER — APPOINTMENT (OUTPATIENT)
Dept: PULMONOLOGY | Facility: CLINIC | Age: 75
End: 2025-07-30
Attending: INTERNAL MEDICINE
Payer: MEDICARE

## 2025-08-16 DIAGNOSIS — J44.9 COPD, SEVERE (HCC): ICD-10-CM

## 2025-08-18 RX ORDER — BUDESONIDE 0.5 MG/2ML
INHALANT ORAL
Qty: 360 ML | Refills: 2 | Status: SHIPPED | OUTPATIENT
Start: 2025-08-18

## (undated) DEVICE — PINNACLE INTRODUCER SHEATH: Brand: PINNACLE

## (undated) DEVICE — CATH SWAN GANZ TD 7FR 110CM

## (undated) DEVICE — MICROPUNCTURE INTRODUCER SET SILHOUETTE TRANSITIONLESS PUSH-PLUS DESIGN - STIFFENED CANNULA WITH NITINOL WIRE GUIDE: Brand: MICROPUNCTURE